# Patient Record
Sex: MALE | Race: WHITE | NOT HISPANIC OR LATINO | URBAN - METROPOLITAN AREA
[De-identification: names, ages, dates, MRNs, and addresses within clinical notes are randomized per-mention and may not be internally consistent; named-entity substitution may affect disease eponyms.]

---

## 2022-04-14 ENCOUNTER — APPOINTMENT (OUTPATIENT)
Dept: LAB | Facility: CLINIC | Age: 72
End: 2022-04-14
Payer: MEDICARE

## 2022-04-14 ENCOUNTER — APPOINTMENT (OUTPATIENT)
Dept: RADIOLOGY | Facility: CLINIC | Age: 72
End: 2022-04-14
Payer: MEDICARE

## 2022-04-14 ENCOUNTER — OFFICE VISIT (OUTPATIENT)
Dept: INTERNAL MEDICINE CLINIC | Facility: CLINIC | Age: 72
End: 2022-04-14
Payer: MEDICARE

## 2022-04-14 VITALS
DIASTOLIC BLOOD PRESSURE: 84 MMHG | WEIGHT: 143.8 LBS | SYSTOLIC BLOOD PRESSURE: 136 MMHG | HEIGHT: 67 IN | BODY MASS INDEX: 22.57 KG/M2 | TEMPERATURE: 98.2 F | OXYGEN SATURATION: 99 % | HEART RATE: 79 BPM

## 2022-04-14 DIAGNOSIS — Z13.0 SCREENING FOR DEFICIENCY ANEMIA: ICD-10-CM

## 2022-04-14 DIAGNOSIS — G25.5 MUSCLE, JERKY MOVEMENTS (UNCONTROLLED): ICD-10-CM

## 2022-04-14 DIAGNOSIS — Z13.31 NEGATIVE DEPRESSION SCREENING: ICD-10-CM

## 2022-04-14 DIAGNOSIS — Z13.220 SCREENING FOR LIPID DISORDERS: ICD-10-CM

## 2022-04-14 DIAGNOSIS — Z13.29 SCREENING FOR THYROID DISORDER: ICD-10-CM

## 2022-04-14 DIAGNOSIS — Z11.59 NEED FOR HEPATITIS C SCREENING TEST: ICD-10-CM

## 2022-04-14 DIAGNOSIS — Z12.5 SCREENING FOR PROSTATE CANCER: ICD-10-CM

## 2022-04-14 DIAGNOSIS — Z00.00 WELL ADULT EXAM: ICD-10-CM

## 2022-04-14 DIAGNOSIS — Z12.11 SCREENING FOR COLON CANCER: ICD-10-CM

## 2022-04-14 DIAGNOSIS — Z23 ENCOUNTER FOR VACCINATION: ICD-10-CM

## 2022-04-14 DIAGNOSIS — R73.09 OTHER ABNORMAL GLUCOSE: ICD-10-CM

## 2022-04-14 DIAGNOSIS — F17.290 CIGAR SMOKER: ICD-10-CM

## 2022-04-14 DIAGNOSIS — Z57.9 OCCUPATIONAL EXPOSURE IN WORKPLACE: ICD-10-CM

## 2022-04-14 DIAGNOSIS — R41.89 RECURRENT EPISODES OF UNRESPONSIVENESS: ICD-10-CM

## 2022-04-14 DIAGNOSIS — Z13.1 SCREENING FOR DIABETES MELLITUS (DM): ICD-10-CM

## 2022-04-14 DIAGNOSIS — Z12.11 SCREEN FOR COLON CANCER: ICD-10-CM

## 2022-04-14 DIAGNOSIS — M62.9 DISORDER OF MUSCLE, UNSPECIFIED: ICD-10-CM

## 2022-04-14 DIAGNOSIS — Z72.0 TOBACCO ABUSE: ICD-10-CM

## 2022-04-14 DIAGNOSIS — R41.89 RECURRENT EPISODES OF UNRESPONSIVENESS: Primary | ICD-10-CM

## 2022-04-14 LAB
ALBUMIN SERPL BCP-MCNC: 4.2 G/DL (ref 3.5–5)
ALP SERPL-CCNC: 64 U/L (ref 46–116)
ALT SERPL W P-5'-P-CCNC: 23 U/L (ref 12–78)
AMORPH URATE CRY URNS QL MICRO: ABNORMAL
ANION GAP SERPL CALCULATED.3IONS-SCNC: 2 MMOL/L (ref 4–13)
AST SERPL W P-5'-P-CCNC: 16 U/L (ref 5–45)
BACTERIA UR QL AUTO: ABNORMAL /HPF
BASOPHILS # BLD AUTO: 0.03 THOUSANDS/ΜL (ref 0–0.1)
BASOPHILS NFR BLD AUTO: 0 % (ref 0–1)
BILIRUB SERPL-MCNC: 1.56 MG/DL (ref 0.2–1)
BILIRUB UR QL STRIP: NEGATIVE
BUN SERPL-MCNC: 12 MG/DL (ref 5–25)
CALCIUM SERPL-MCNC: 9.7 MG/DL (ref 8.3–10.1)
CHLORIDE SERPL-SCNC: 105 MMOL/L (ref 100–108)
CHOLEST SERPL-MCNC: 237 MG/DL
CLARITY UR: CLEAR
CO2 SERPL-SCNC: 31 MMOL/L (ref 21–32)
COLOR UR: YELLOW
CREAT SERPL-MCNC: 0.97 MG/DL (ref 0.6–1.3)
EOSINOPHIL # BLD AUTO: 0.11 THOUSAND/ΜL (ref 0–0.61)
EOSINOPHIL NFR BLD AUTO: 1 % (ref 0–6)
ERYTHROCYTE [DISTWIDTH] IN BLOOD BY AUTOMATED COUNT: 13.6 % (ref 11.6–15.1)
EST. AVERAGE GLUCOSE BLD GHB EST-MCNC: 117 MG/DL
GFR SERPL CREATININE-BSD FRML MDRD: 77 ML/MIN/1.73SQ M
GLUCOSE P FAST SERPL-MCNC: 96 MG/DL (ref 65–99)
GLUCOSE UR STRIP-MCNC: NEGATIVE MG/DL
HBA1C MFR BLD: 5.7 %
HCT VFR BLD AUTO: 45.5 % (ref 36.5–49.3)
HCV AB SER QL: NORMAL
HDLC SERPL-MCNC: 61 MG/DL
HGB BLD-MCNC: 14.8 G/DL (ref 12–17)
HGB UR QL STRIP.AUTO: NEGATIVE
IMM GRANULOCYTES # BLD AUTO: 0.03 THOUSAND/UL (ref 0–0.2)
IMM GRANULOCYTES NFR BLD AUTO: 0 % (ref 0–2)
KETONES UR STRIP-MCNC: NEGATIVE MG/DL
LDLC SERPL CALC-MCNC: 141 MG/DL (ref 0–100)
LEUKOCYTE ESTERASE UR QL STRIP: NEGATIVE
LYMPHOCYTES # BLD AUTO: 2.43 THOUSANDS/ΜL (ref 0.6–4.47)
LYMPHOCYTES NFR BLD AUTO: 28 % (ref 14–44)
MCH RBC QN AUTO: 29.6 PG (ref 26.8–34.3)
MCHC RBC AUTO-ENTMCNC: 32.5 G/DL (ref 31.4–37.4)
MCV RBC AUTO: 91 FL (ref 82–98)
MONOCYTES # BLD AUTO: 0.69 THOUSAND/ΜL (ref 0.17–1.22)
MONOCYTES NFR BLD AUTO: 8 % (ref 4–12)
NEUTROPHILS # BLD AUTO: 5.56 THOUSANDS/ΜL (ref 1.85–7.62)
NEUTS SEG NFR BLD AUTO: 63 % (ref 43–75)
NITRITE UR QL STRIP: NEGATIVE
NON-SQ EPI CELLS URNS QL MICRO: ABNORMAL /HPF
NRBC BLD AUTO-RTO: 0 /100 WBCS
PH UR STRIP.AUTO: 7 [PH]
PLATELET # BLD AUTO: 181 THOUSANDS/UL (ref 149–390)
PMV BLD AUTO: 10.7 FL (ref 8.9–12.7)
POTASSIUM SERPL-SCNC: 4.1 MMOL/L (ref 3.5–5.3)
PROT SERPL-MCNC: 7.3 G/DL (ref 6.4–8.2)
PROT UR STRIP-MCNC: ABNORMAL MG/DL
PSA SERPL-MCNC: 1.4 NG/ML (ref 0–4)
RBC # BLD AUTO: 5 MILLION/UL (ref 3.88–5.62)
RBC #/AREA URNS AUTO: ABNORMAL /HPF
SODIUM SERPL-SCNC: 138 MMOL/L (ref 136–145)
SP GR UR STRIP.AUTO: 1.02 (ref 1–1.03)
TRIGL SERPL-MCNC: 174 MG/DL
TSH SERPL DL<=0.05 MIU/L-ACNC: 2.96 UIU/ML (ref 0.45–4.5)
UROBILINOGEN UR STRIP-ACNC: <2 MG/DL
WBC # BLD AUTO: 8.85 THOUSAND/UL (ref 4.31–10.16)
WBC #/AREA URNS AUTO: ABNORMAL /HPF

## 2022-04-14 PROCEDURE — 93000 ELECTROCARDIOGRAM COMPLETE: CPT | Performed by: INTERNAL MEDICINE

## 2022-04-14 PROCEDURE — 81001 URINALYSIS AUTO W/SCOPE: CPT | Performed by: INTERNAL MEDICINE

## 2022-04-14 PROCEDURE — 83036 HEMOGLOBIN GLYCOSYLATED A1C: CPT

## 2022-04-14 PROCEDURE — 80053 COMPREHEN METABOLIC PANEL: CPT

## 2022-04-14 PROCEDURE — 86803 HEPATITIS C AB TEST: CPT

## 2022-04-14 PROCEDURE — 84443 ASSAY THYROID STIM HORMONE: CPT

## 2022-04-14 PROCEDURE — 85025 COMPLETE CBC W/AUTO DIFF WBC: CPT

## 2022-04-14 PROCEDURE — 80061 LIPID PANEL: CPT

## 2022-04-14 PROCEDURE — 36415 COLL VENOUS BLD VENIPUNCTURE: CPT

## 2022-04-14 PROCEDURE — 99204 OFFICE O/P NEW MOD 45 MIN: CPT | Performed by: INTERNAL MEDICINE

## 2022-04-14 PROCEDURE — G0103 PSA SCREENING: HCPCS

## 2022-04-14 PROCEDURE — 71046 X-RAY EXAM CHEST 2 VIEWS: CPT

## 2022-04-14 PROCEDURE — G0009 ADMIN PNEUMOCOCCAL VACCINE: HCPCS | Performed by: INTERNAL MEDICINE

## 2022-04-14 PROCEDURE — 90677 PCV20 VACCINE IM: CPT | Performed by: INTERNAL MEDICINE

## 2022-04-14 SDOH — HEALTH STABILITY - PHYSICAL HEALTH: OCCUPATIONAL EXPOSURE TO UNSPECIFIED RISK FACTOR: Z57.9

## 2022-04-14 NOTE — PATIENT INSTRUCTIONS
Cigarette Smoking and Your Health   AMBULATORY CARE:   Risks to your health if you smoke:  Nicotine and other chemicals found in tobacco and e-cigarettes can damage every cell in your body  Even if you are a light smoker, you have an increased risk for cancer, heart disease, and lung disease  If you are pregnant or have diabetes, smoking increases your risk for complications  Nicotine can affect an adolescent's developing brain  This can lead to trouble thinking, learning, or paying attention  Benefits to your health if you stop smoking:   · You decrease respiratory symptoms such as coughing, wheezing, and shortness of breath  · You reduce your risk for cancers of the lung, mouth, throat, kidney, bladder, pancreas, stomach, and cervix  If you already have cancer, you increase the benefits of chemotherapy  You also reduce your risk for cancer returning or a second cancer from developing  · You reduce your risk for heart disease, blood clots, heart attack, and stroke  · You reduce your risk for lung infections, and diseases such as pneumonia, asthma, chronic bronchitis, and emphysema  · Your circulation improves  More oxygen can be delivered to your body  If you have diabetes, you lower your risk for complications, such as kidney, artery, and eye diseases  You also lower your risk for nerve damage  Nerve damage can lead to amputations, poor vision, and blindness  · You improve your body's ability to heal and to fight infections  · An adolescent can help his or her brain and body develop in a healthy way  Talk to your adolescent about all the health risks of nicotine  If you can, start talking about nicotine when your child is younger than 12 years  This may make it easier for him or her not to start using nicotine as a teenager or adult  Explain to him or her that it is best never to start  It can be hard to try to quit later      Benefits to the health of others if you stop smoking:  Tobacco is harmful to nonsmokers who breathe in your secondhand smoke  The following are ways the health of others around you may improve when you stop smoking:  · You lower the risks for lung cancer and heart disease in nonsmoking adults  · If you are pregnant, you lower the risk for miscarriage, early delivery, low birth weight, and stillbirth  You also lower your baby's risk for SIDS, obesity, developmental delay, and neurobehavioral problems, such as ADHD  · If you have children, you lower their risk for ear infections, colds, pneumonia, bronchitis, and asthma  Follow up with your doctor as directed:  Write down your questions so you remember to ask them during your visits  For support and more information:   · [employer]  [address] [facility]  [address]  Phone: [facility] [address]  Phone: 2- 571 - [phone]  Web Address: Isaura oliver    · Smokefree  gov  Phone: 3- 787 - 525-5196  Web Address: www smokefree  [facility] 21 2022 Information is for End User's use only and may not be sold, redistributed or otherwise used for commercial purposes  All illustrations and images included in CareNotes® are the copyrighted property of [employer]  or [address] [place] [address]  The above information is an  only  It is not intended as medical advice for individual conditions or treatments  Talk to your doctor, nurse or pharmacist before following any medical regimen to see if it is safe and effective for you

## 2022-04-14 NOTE — PROGRESS NOTES
Depression Screening and Follow-up Plan: Patient was screened for depression during today's encounter  They screened negative with a PHQ-2 score of 0  Tobacco Cessation Counseling: Tobacco cessation counseling was provided  The patient is sincerely urged to quit consumption of tobacco  He is not ready to quit tobacco  Medication options discussed  Side effects of medication not discussed  Patient refused medication     Assessment/Plan:  Problem List Items Addressed This Visit        Other    Tobacco abuse      Other Visit Diagnoses     Recurrent episodes of unresponsiveness    -  Primary    Relevant Orders    XR chest pa & lateral    CT head wo contrast    Screening for colon cancer        Relevant Orders    Ambulatory referral for colonoscopy    Well adult exam        Relevant Orders    CBC and differential    PSA, Total Screen    Comprehensive metabolic panel    Hemoglobin A1C    Lipid Panel with Direct LDL reflex    TSH, 3rd generation with Free T4 reflex    Screening for prostate cancer        Relevant Orders    PSA, Total Screen    Screening for lipid disorders        Relevant Orders    Lipid Panel with Direct LDL reflex    Screening for diabetes mellitus (DM)        Relevant Orders    Hemoglobin A1C    Screening for thyroid disorder        Relevant Orders    TSH, 3rd generation with Free T4 reflex    Screening for deficiency anemia        Relevant Orders    CBC and differential    Negative depression screening        Encounter for vaccination        Relevant Medications    Pneumococcal 20-Terri Conj Vacc (PREVNAR-20) 0 5 ML CODIE    Screen for colon cancer        Need for hepatitis C screening test        Relevant Orders    Hepatitis C antibody    Cigar smoker        Relevant Orders    XR chest pa & lateral    Muscle, jerky movements (uncontrolled)        Relevant Orders    CBC and differential    Comprehensive metabolic panel    Hemoglobin A1C    TSH, 3rd generation with Free T4 reflex    UA w Reflex to Microscopic w Reflex to Culture    POCT ECG (Completed)    XR chest pa & lateral    CT head wo contrast    Occupational exposure in workplace        Relevant Orders    CBC and differential    Comprehensive metabolic panel    Hemoglobin A1C    TSH, 3rd generation with Free T4 reflex    UA w Reflex to Microscopic w Reflex to Culture    POCT ECG (Completed)    XR chest pa & lateral    Other abnormal glucose         Relevant Orders    Hemoglobin A1C    Disorder of muscle, unspecified         Relevant Orders    TSH, 3rd generation with Free T4 reflex           Diagnoses and all orders for this visit:    Recurrent episodes of unresponsiveness  -     XR chest pa & lateral; Future  -     CT head wo contrast; Future    Screening for colon cancer  -     Ambulatory referral for colonoscopy; Future    Well adult exam  -     CBC and differential; Future  -     PSA, Total Screen; Future  -     Comprehensive metabolic panel; Future  -     Hemoglobin A1C; Future  -     Lipid Panel with Direct LDL reflex; Future  -     TSH, 3rd generation with Free T4 reflex; Future    Screening for prostate cancer  -     PSA, Total Screen; Future    Screening for lipid disorders  -     Lipid Panel with Direct LDL reflex; Future    Screening for diabetes mellitus (DM)  -     Hemoglobin A1C; Future    Screening for thyroid disorder  -     TSH, 3rd generation with Free T4 reflex; Future    Screening for deficiency anemia  -     CBC and differential; Future    Negative depression screening    Encounter for vaccination  -     Pneumococcal 20-Terri Conj Vacc (PREVNAR-20) 0 5 ML CODIE; Inject 0 5 mL into a muscle once for 1 dose    Screen for colon cancer    Need for hepatitis C screening test  -     Hepatitis C antibody; Future    Tobacco abuse    Cigar smoker  -     XR chest pa & lateral; Future    Muscle, jerky movements (uncontrolled)  -     CBC and differential; Future  -     Comprehensive metabolic panel;  Future  -     Hemoglobin A1C; Future  - TSH, 3rd generation with Free T4 reflex; Future  -     UA w Reflex to Microscopic w Reflex to Culture  -     POCT ECG  -     XR chest pa & lateral; Future  -     CT head wo contrast; Future    Occupational exposure in workplace  -     CBC and differential; Future  -     Comprehensive metabolic panel; Future  -     Hemoglobin A1C; Future  -     TSH, 3rd generation with Free T4 reflex; Future  -     UA w Reflex to Microscopic w Reflex to Culture  -     POCT ECG  -     XR chest pa & lateral; Future    Other abnormal glucose   -     Hemoglobin A1C; Future    Disorder of muscle, unspecified   -     TSH, 3rd generation with Free T4 reflex; Future        No problem-specific Assessment & Plan notes found for this encounter  A/P: Stable  Will check routine labs, including Hep c  Will update his pneumonia vaccine  Needs Td, but no wounds  Will order CRC  Stop cigars and will check an alpha one  Will check a cxr given smoking and s/s to r/o a lung lesion  EKG is normal  Doubt s/s are due to cardiac  Feel pt may be having seizures  Will check a CT head and labs  May need to see neuro and may need an EMG  May need an echo and carotid duplex  BMI is good  Continue current treatment and RTC two weeks for f/u MRI, labs, CXR, and AWV  Subjective:      Patient ID: Huang Sanders is a 67 y o  male  WM, w/o a PCP, presents to establish  PMH includes cigar smoking  PSH is denied  Occassional cigar Smoker and two beers daily/ ETOH  Doing ok and no c/o's,but wife reports pt with a three month h/o of intermittent periods where the pt stares and is unresponsive, makes "clucking" sounds and the right arm flaps  Last about a minute  No sz activity, prodrome or warning  No triggers  No tongue biting and no incontinence  No h/o TBI/CHI  No pattern  No Headaches  Remains active w/o difficulty and no falls  Denies depression  No suicidal or violent ideations  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 [employer]  No recent travel  No fever, chills, or sweats  No unexplained wt change  Denies CP, SOB, palpitations, edema, orthopnea, or PND  No sz or syncope  No changes in bowel or bladder habits  No trouble swallowing  Appetite and sleep are good  Overdue to see both a DDS and an eye doctor  Currently due for labs, vaccines, and CRC   The following portions of the patient's history were reviewed and updated as appropriate:   He has no past medical history on file  ,  does not have any pertinent problems on file  ,   has a past surgical history that includes Wrist surgery (Right) and Hernia repair  ,  family history includes Cancer in his mother; Prostate cancer in his father  ,   reports that he has been smoking cigars  He has never used smokeless tobacco  He reports current alcohol use  No history on file for drug use ,  has No Known Allergies     Current Outpatient Medications   Medication Sig Dispense Refill    Pneumococcal 20-Terri Conj Vacc (PREVNAR-20) 0 5 ML CODIE Inject 0 5 mL into a muscle once for 1 dose 0 5 mL 0     No current facility-administered medications for this visit  Review of Systems   Constitutional: Negative for activity change, chills, diaphoresis, fatigue and fever  HENT: Negative  Eyes: Negative for visual disturbance  Respiratory: Negative for cough, chest tightness, shortness of breath and wheezing  Cardiovascular: Negative for chest pain, palpitations and leg swelling  Gastrointestinal: Negative for abdominal pain, constipation, diarrhea, nausea and vomiting  Endocrine: Negative for cold intolerance and heat intolerance  Genitourinary: Negative for difficulty urinating, dysuria and frequency  Musculoskeletal: Negative for arthralgias, gait problem and myalgias  Neurological: Positive for tremors  Negative for dizziness, seizures, facial asymmetry, speech difficulty, light-headedness, numbness and headaches  Unresponsive with staring and right arm movements  Psychiatric/Behavioral: Negative for confusion and dysphoric mood  The patient is not nervous/anxious  PHQ-2/9 Depression Screening    Little interest or pleasure in doing things: 0 - not at all  Feeling down, depressed, or hopeless: 0 - not at all  PHQ-2 Score: 0  PHQ-2 Interpretation: Negative depression screen        Objective:  Vitals:    04/14/22 0901   BP: 136/84   Pulse: 79   Temp: 98 2 °F (36 8 °C)   TempSrc: Tympanic   SpO2: 99%   Weight: 65 2 kg (143 lb 12 8 oz)   Height: 5' 7" (1 702 m)     Body mass index is 22 52 kg/m²  Tobacco Cessation Counseling: Tobacco cessation counseling and education was provided  The patient is sincerely urged to quit consumption of tobacco  He is not ready to quit tobacco  The numerous health risks of tobacco consumption were discussed  If he decides to quit, there are a number of helpful adjunctive aids, and he can see me to discuss nicotine replacement therapy, chantix, or bupropion anytime in the future  Physical Exam  Vitals and nursing note reviewed  Constitutional:       General: He is not in acute distress  Appearance: Normal appearance  He is not ill-appearing  HENT:      Head: Normocephalic and atraumatic  Mouth/Throat:      Mouth: Mucous membranes are moist    Eyes:      Extraocular Movements: Extraocular movements intact  Conjunctiva/sclera: Conjunctivae normal       Pupils: Pupils are equal, round, and reactive to light  Neck:      Vascular: No carotid bruit  Cardiovascular:      Rate and Rhythm: Normal rate and regular rhythm  Heart sounds: Normal heart sounds  Pulmonary:      Effort: Pulmonary effort is normal  No respiratory distress  Breath sounds: Normal breath sounds  No wheezing or rales  Abdominal:      General: Bowel sounds are normal  There is no distension  Palpations: Abdomen is soft  Tenderness: There is no abdominal tenderness     Musculoskeletal:         General: No swelling, tenderness or deformity  Normal range of motion  Cervical back: Normal range of motion and neck supple  No rigidity or tenderness  Right lower leg: No edema  Left lower leg: No edema  Lymphadenopathy:      Cervical: No cervical adenopathy  Neurological:      General: No focal deficit present  Mental Status: He is alert and oriented to person, place, and time  Mental status is at baseline  Cranial Nerves: No cranial nerve deficit  Motor: No weakness  Coordination: Coordination normal       Gait: Gait normal       Deep Tendon Reflexes: Reflexes normal    Psychiatric:         Mood and Affect: Mood normal          Behavior: Behavior normal          Thought Content:  Thought content normal          Judgment: Judgment normal

## 2022-04-20 DIAGNOSIS — E78.3 MIXED HYPERGLYCERIDEMIA: Primary | ICD-10-CM

## 2022-04-20 RX ORDER — ATORVASTATIN CALCIUM 10 MG/1
10 TABLET, FILM COATED ORAL DAILY
Qty: 90 TABLET | Refills: 3 | Status: SHIPPED | OUTPATIENT
Start: 2022-04-20

## 2022-04-21 ENCOUNTER — HOSPITAL ENCOUNTER (OUTPATIENT)
Dept: CT IMAGING | Facility: HOSPITAL | Age: 72
Discharge: HOME | End: 2022-04-21
Attending: INTERNAL MEDICINE
Payer: MEDICARE

## 2022-04-21 DIAGNOSIS — R41.89 RECURRENT EPISODES OF UNRESPONSIVENESS: ICD-10-CM

## 2022-04-21 DIAGNOSIS — G25.5 MUSCLE, JERKY MOVEMENTS (UNCONTROLLED): ICD-10-CM

## 2022-04-21 PROCEDURE — G1004 CDSM NDSC: HCPCS

## 2022-04-21 PROCEDURE — 70450 CT HEAD/BRAIN W/O DYE: CPT

## 2022-04-26 DIAGNOSIS — E78.3 MIXED HYPERGLYCERIDEMIA: Primary | ICD-10-CM

## 2022-04-29 ENCOUNTER — OFFICE VISIT (OUTPATIENT)
Dept: INTERNAL MEDICINE CLINIC | Facility: CLINIC | Age: 72
End: 2022-04-29
Payer: MEDICARE

## 2022-04-29 VITALS
BODY MASS INDEX: 22.52 KG/M2 | WEIGHT: 143.5 LBS | DIASTOLIC BLOOD PRESSURE: 68 MMHG | TEMPERATURE: 98 F | HEART RATE: 87 BPM | OXYGEN SATURATION: 97 % | HEIGHT: 67 IN | SYSTOLIC BLOOD PRESSURE: 122 MMHG

## 2022-04-29 DIAGNOSIS — R41.89 RECURRENT EPISODES OF UNRESPONSIVENESS: Primary | ICD-10-CM

## 2022-04-29 DIAGNOSIS — G25.9 MOVEMENT DISORDER: ICD-10-CM

## 2022-04-29 DIAGNOSIS — E80.6 HYPERBILIRUBINEMIA: ICD-10-CM

## 2022-04-29 DIAGNOSIS — Z13.31 NEGATIVE DEPRESSION SCREENING: ICD-10-CM

## 2022-04-29 DIAGNOSIS — Z13.6 ENCOUNTER FOR SCREENING FOR ABDOMINAL AORTIC ANEURYSM (AAA) IN PATIENT 50 YEARS OF AGE OR OLDER WITHOUT OTHER RISK FACTORS FOR AAA: ICD-10-CM

## 2022-04-29 DIAGNOSIS — Z12.12 ENCOUNTER FOR SCREENING FOR COLORECTAL MALIGNANT NEOPLASM: ICD-10-CM

## 2022-04-29 DIAGNOSIS — G25.5 MUSCLE, JERKY MOVEMENTS (UNCONTROLLED): ICD-10-CM

## 2022-04-29 DIAGNOSIS — Z12.11 ENCOUNTER FOR SCREENING FOR COLORECTAL MALIGNANT NEOPLASM: ICD-10-CM

## 2022-04-29 DIAGNOSIS — E78.2 MIXED HYPERLIPIDEMIA: ICD-10-CM

## 2022-04-29 DIAGNOSIS — Z00.00 MEDICARE ANNUAL WELLNESS VISIT, INITIAL: ICD-10-CM

## 2022-04-29 DIAGNOSIS — F17.290 CIGAR SMOKER: ICD-10-CM

## 2022-04-29 DIAGNOSIS — Z12.11 SCREEN FOR COLON CANCER: ICD-10-CM

## 2022-04-29 PROBLEM — Z87.891 HISTORY OF PRIOR CIGAR SMOKING: Status: ACTIVE | Noted: 2022-04-14

## 2022-04-29 PROBLEM — Z87.891 HISTORY OF PRIOR CIGAR SMOKING: Status: RESOLVED | Noted: 2022-04-14 | Resolved: 2022-04-29

## 2022-04-29 PROBLEM — Z72.0 TOBACCO ABUSE: Status: RESOLVED | Noted: 2022-04-14 | Resolved: 2022-04-29

## 2022-04-29 PROCEDURE — 1123F ACP DISCUSS/DSCN MKR DOCD: CPT | Performed by: INTERNAL MEDICINE

## 2022-04-29 PROCEDURE — G0438 PPPS, INITIAL VISIT: HCPCS | Performed by: INTERNAL MEDICINE

## 2022-04-29 PROCEDURE — 99214 OFFICE O/P EST MOD 30 MIN: CPT | Performed by: INTERNAL MEDICINE

## 2022-04-29 NOTE — PROGRESS NOTES
Assessment and Plan:     Problem List Items Addressed This Visit        Other    Mixed hyperlipidemia    Hyperbilirubinemia      Other Visit Diagnoses     Recurrent episodes of unresponsiveness    -  Primary    Muscle, jerky movements (uncontrolled)        Movement disorder        Medicare annual wellness visit, initial        Encounter for screening for colorectal malignant neoplasm        Negative depression screening        Screen for colon cancer               Preventive health issues were discussed with patient, and age appropriate screening tests were ordered as noted in patient's After Visit Summary  Personalized health advice and appropriate referrals for health education or preventive services given if needed, as noted in patient's After Visit Summary  History of Present Illness:     Patient presents for Medicare Annual Wellness visit    Patient Care Team:  Martha Carrillo DO as PCP - General (Internal Medicine)     Problem List:     Patient Active Problem List   Diagnosis    Mixed hyperlipidemia    Hyperbilirubinemia      Past Medical and Surgical History:     History reviewed  No pertinent past medical history  Past Surgical History:   Procedure Laterality Date    HERNIA REPAIR      when he was a kid    WRIST SURGERY Right     when he was a kid      Family History:     Family History   Problem Relation Age of Onset    Cancer Mother     Diabetes Mother     Prostate cancer Father     Cancer Brother       Social History:     Social History     Socioeconomic History    Marital status: /Civil Union     Spouse name: None    Number of children: None    Years of education: None    Highest education level: None   Occupational History    Occupation: retired   Tobacco Use    Smoking status: Light Tobacco Smoker     Types: Cigars    Smokeless tobacco: Never Used    Tobacco comment: cigar use   Vaping Use    Vaping Use: Never used   Substance and Sexual Activity    Alcohol use:  Yes Comment: two beers/ day and occassional wine    Drug use: None    Sexual activity: Yes     Partners: Female   Other Topics Concern    None   Social History Narrative        One child    Retired nicole    Lives with his wife  Social Determinants of Health     Financial Resource Strain: Not on file   Food Insecurity: Not on file   Transportation Needs: Not on file   Physical Activity: Not on file   Stress: Not on file   Social Connections: Not on file   Intimate Partner Violence: Not on file   Housing Stability: Not on file      Medications and Allergies:     Current Outpatient Medications   Medication Sig Dispense Refill    atorvastatin (LIPITOR) 10 mg tablet Take 1 tablet (10 mg total) by mouth daily 90 tablet 3     No current facility-administered medications for this visit  No Known Allergies   Immunizations:     Immunization History   Administered Date(s) Administered    Pneumococcal Conjugate Vaccine 20-valent (Pcv20), Polysace 04/14/2022      Health Maintenance:         Topic Date Due    Colorectal Cancer Screening  Never done    Hepatitis C Screening  Completed         Topic Date Due    COVID-19 Vaccine (1) Never done    Pneumococcal Vaccine: 65+ Years (1 of 2 - PPSV23) 04/09/1956    DTaP,Tdap,and Td Vaccines (1 - Tdap) Never done    Influenza Vaccine (1) Never done      Medicare Health Risk Assessment:     /68   Pulse 87   Temp 98 °F (36 7 °C)   Ht 5' 7" (1 702 m)   Wt 65 1 kg (143 lb 8 oz)   SpO2 97%   BMI 22 48 kg/m²      Saida Solis is here for his Initial Wellness visit  Health Risk Assessment:   Patient rates overall health as good  Patient feels that their physical health rating is same  Patient is satisfied with their life  Eyesight was rated as same  Hearing was rated as same  Patient feels that their emotional and mental health rating is same  Patients states they are sometimes angry  Patient states they are never, rarely unusually tired/fatigued   Pain experienced in the last 7 days has been none  Patient states that he has experienced no weight loss or gain in last 6 months  Depression Screening:   PHQ-2 Score: 0      Fall Risk Screening: In the past year, patient has experienced: no history of falling in past year      Home Safety:  Patient does not have trouble with stairs inside or outside of their home  Patient has working smoke alarms and has working carbon monoxide detector  Home safety hazards include: none  Nutrition:   Current diet is Regular  Medications:   Patient is not currently taking any over-the-counter supplements  Patient is able to manage medications  Activities of Daily Living (ADLs)/Instrumental Activities of Daily Living (IADLs):   Walk and transfer into and out of bed and chair?: Yes  Dress and groom yourself?: Yes    Bathe or shower yourself?: Yes    Feed yourself? Yes  Do your laundry/housekeeping?: Yes  Manage your money, pay your bills and track your expenses?: Yes  Make your own meals?: Yes    Do your own shopping?: Yes    Previous Hospitalizations:   Any hospitalizations or ED visits within the last 12 months?: No      Advance Care Planning:   Living will: Yes    Durable POA for healthcare:  Yes    Advanced directive: Yes    Advanced directive counseling given: Yes    Five wishes given: No    Patient declined ACP directive: No    End of Life Decisions reviewed with patient: Yes    Provider agrees with end of life decisions: Yes      Cognitive Screening:   Provider or family/friend/caregiver concerned regarding cognition?: No    PREVENTIVE SCREENINGS      Cardiovascular Screening:    General: Screening Not Indicated, History Lipid Disorder and Screening Current      Diabetes Screening:     General: Screening Current      Colorectal Cancer Screening:     General: Risks and Benefits Discussed    Due for: Cologuard      Prostate Cancer Screening:    General: Screening Current      Osteoporosis Screening:    General: Screening Not Indicated      Abdominal Aortic Aneurysm (AAA) Screening:    Risk factors include: age between 73-69 yo and tobacco use        General: Risks and Benefits Discussed and Patient Declines      Lung Cancer Screening:     General: Screening Not Indicated      Hepatitis C Screening:    General: Screening Current    Screening, Brief Intervention, and Referral to Treatment (SBIRT)    Screening  Typical number of drinks in a day: 2  Typical number of drinks in a week: 14  Interpretation: Low risk drinking behavior  Single Item Drug Screening:  How often have you used an illegal drug (including marijuana) or a prescription medication for non-medical reasons in the past year? never    Single Item Drug Screen Score: 0  Interpretation: Negative screen for possible drug use disorder    Other Counseling Topics:   Car/seat belt/driving safety, sunscreen and calcium and vitamin D intake and regular weightbearing exercise  A/P: Doing well and no falls reported  Denies depression and feels safe at home  Diverse diet  No problems operating a MV and uses seat belts  Has a living will and POA  No DME or referrals needed today  RTC one year for medicare wellness       Senia Finch DO

## 2022-04-29 NOTE — PATIENT INSTRUCTIONS
Hyperlipidemia   WHAT YOU NEED TO KNOW:   What is hyperlipidemia? Hyperlipidemia is a high level of lipids (fats) in your blood  These lipids include cholesterol or triglycerides  Lipids are made by your body  They also come from the foods you eat  Your body needs lipids to work properly, but high levels increase your risk for heart disease, heart attack, and stroke  What increases my risk for hyperlipidemia? · Family history of high lipid levels    · Diet high in saturated fats, cholesterol, or calories    · High alcohol intake or smoking    · Lack of regular physical activity    · Medical conditions such as hypothyroidism, obesity, or type 2 diabetes    · Certain medicines, such as blood pressure medicines, hormones, and steroids    How is hyperlipidemia managed and treated? Your healthcare provider may first recommend that you make lifestyle changes to help decrease your lipid levels  Your provider may recommend you work with a team to manage hyperlipidemia  The team may include medical experts such as a dietitian, an exercise or physical therapist, and a behavior therapist  Your family members may be included in helping you create lifestyle changes  You may also need to take medicine to lower your lipid levels  Some of the lifestyle changes you may need to make include the following:  · Maintain a healthy weight  Ask your healthcare provider what a healthy weight is for you  Ask him or her to help you create a weight loss plan if you are overweight  Weight loss can decrease your cholesterol and triglyceride levels  · Be physically active throughout the day  Physical activity, such as exercise, lowers your cholesterol levels and helps you maintain a healthy weight  Get 30 minutes or more of aerobic exercise 4 to 6 days each week  You can split your exercise into four 10-minute workouts instead of 30 minutes at one time  Examples of aerobic exercises include walking briskly, swimming, or riding a bike  Work with your healthcare provider to plan the best exercise program for you  Also include strength training at least 2 times each week  Your healthcare providers can help you create a physical activity plan  · Do not smoke  Nicotine and other chemicals in cigarettes and cigars can increase your risk for a heart attack and stroke  Ask your healthcare provider for information if you currently smoke and need help to quit  E-cigarettes or smokeless tobacco still contain nicotine  Talk to your healthcare provider before you use these products  · Eat heart-healthy foods  A dietitian or your provider can give you more information on low-sodium plans or the DASH (Dietary Approaches to Stop Hypertension) eating plan  The DASH plan is low in sodium, processed sugar, unhealthy fats, and total fat  It is high in potassium, calcium, and fiber  It is high in potassium, calcium, and fiber  These can be found in vegetables, fruit, and whole-grain foods  The following are ways to get more heart-healthy foods:         ? Decrease the total amount of fat you eat  Choose lean meats, fat-free or 1% fat milk, and low-fat dairy products, such as yogurt and cheese  Limit or do not eat red meat  Red meats are high in fat and cholesterol  ? Replace unhealthy fats with healthy fats  Unhealthy fats include saturated fat, trans fat, and cholesterol  Choose soft margarines that are low in saturated fat and have little or no trans fat  Monounsaturated fats are healthy fats  These are found in olive oil, canola oil, avocado, and nuts  Polyunsaturated fats are also healthy  These are found in fish, flaxseed, walnuts, and soybeans  ? Eat 5 or more servings of fruits and vegetables every day  They are low in calories and fat, and a good source of essential vitamins  Include dark green, red, and orange vegetables  Examples include spinach, kale, broccoli, and carrots  ? Eat foods high in fiber    Fiber can help lower your cholesterol levels  Choose whole grain, high-fiber foods  Good choices include whole-wheat breads or cereals, beans, peas, fruits, and vegetables  ? Limit sodium (salt) as directed  Too much sodium can affect your fluid balance and blood pressure  Your healthcare provider will tell you how much sodium and potassium are safe for you to have in a day  He or she may recommend that you limit sodium to 2,300 mg a day  Your provider or a dietitian can help you find ways to limit sodium  For example, if you add salt while you cook, do not add more salt at the table  Check labels to find low-sodium or no-salt-added foods  Some low-sodium foods use potassium salts for flavor  Too much potassium can also cause health problems  · Ask your healthcare provider if it is okay for you to drink alcohol  Alcohol can increase your cholesterol and triglyceride levels  Your provider can tell you how many drinks are okay to have within 24 hours and within 1 week  A drink of alcohol is 12 ounces of beer, 5 ounces of wine, or 1½ ounces of liquor  Call your local emergency number [phone] in the [address] [facility]) or have someone call if:   · You have any of the following signs of a heart attack:      ? Squeezing, pressure, or pain in your chest    ? You may  also have any of the following:     § Discomfort or pain in your back, neck, jaw, stomach, or arm    § Shortness of breath    § Nausea or vomiting    § Lightheadedness or a sudden cold sweat    · You have any of the following signs of a stroke:      ? Numbness or drooping on one side of your face     ? Weakness in an arm or leg    ? Confusion or difficulty speaking    ? Dizziness, a severe headache, or vision loss    When should I call my doctor? · You have questions or concerns about your condition or care  CARE AGREEMENT:   You have the right to help plan your care  Learn about your health condition and how it may be treated   Discuss treatment options with your healthcare providers to decide what care you want to receive  You always have the right to refuse treatment  The above information is an  only  It is not intended as medical advice for individual conditions or treatments  Talk to your doctor, nurse or pharmacist before following any medical regimen to see if it is safe and effective for you  © Copyright [employer] 2022 Information is for End User's use only and may not be sold, redistributed or otherwise used for commercial purposes  All illustrations and images included in CareNotes® are the copyrighted property of [employer]  or [facility] Preventive Visit Patient Instructions  Thank you for completing your Welcome to Medicare Visit or Medicare Annual Wellness Visit today  Your next wellness visit will be due in one year (4/30/2023)  The screening/preventive services that you may require over the next 5-10 years are detailed below  Some tests may not apply to you based off risk factors and/or age  Screening tests ordered at today's visit but not completed yet may show as past due  Also, please note that scanned in results may not display below  Preventive Screenings:  Service Recommendations Previous Testing/Comments   Colorectal Cancer Screening  · Colonoscopy    · Fecal Occult Blood Test (FOBT)/Fecal Immunochemical Test (FIT)  · Fecal DNA/Cologuard Test  · Flexible Sigmoidoscopy Age: 54-65 years old   Colonoscopy: every 10 years (May be performed more frequently if at higher risk)  OR  FOBT/FIT: every 1 year  OR  Cologuard: every 3 years  OR  Sigmoidoscopy: every 5 years  Screening may be recommended earlier than age 48 if at higher risk for colorectal cancer  Also, an individualized decision between you and your healthcare provider will decide whether screening between the ages of 74-80 would be appropriate   Colonoscopy: Not on file  FOBT/FIT: Not on file  Cologuard: Not on file  Sigmoidoscopy: Not on file          Prostate Cancer Screening Individualized decision between patient and health care provider in men between ages of 53-78   Medicare will cover every 12 months beginning on the day after your 50th birthday PSA: 1 4 ng/mL     Screening Current     Hepatitis C Screening Once for adults born between 1945 and 1965  More frequently in patients at high risk for Hepatitis C Hep C Antibody: 04/14/2022    Screening Current   Diabetes Screening 1-2 times per year if you're at risk for diabetes or have pre-diabetes Fasting glucose: 96 mg/dL   A1C: 5 7 %    Screening Current   Cholesterol Screening Once every 5 years if you don't have a lipid disorder  May order more often based on risk factors  Lipid panel: 04/14/2022    Screening Not Indicated  History Lipid Disorder      Other Preventive Screenings Covered by Medicare:  1  Abdominal Aortic Aneurysm (AAA) Screening: covered once if your at risk  You're considered to be at risk if you have a family history of AAA or a male between the age of 73-68 who smoking at least 100 cigarettes in your lifetime  2  Lung Cancer Screening: covers low dose CT scan once per year if you meet all of the following conditions: (1) Age 50-69; (2) No signs or symptoms of lung cancer; (3) Current smoker or have quit smoking within the last 15 years; (4) You have a tobacco smoking history of at least 30 pack years (packs per day x number of years you smoked); (5) You get a written order from a healthcare provider  3  Glaucoma Screening: covered annually if you're considered high risk: (1) You have diabetes OR (2) Family history of glaucoma OR (3)  aged 48 and older OR (3)  American aged 72 and older  3   Osteoporosis Screening: covered every 2 years if you meet one of the following conditions: (1) Have a vertebral abnormality; (2) On glucocorticoid therapy for more than 3 months; (3) Have primary hyperparathyroidism; (4) On osteoporosis medications and need to assess response to drug therapy  5  HIV Screening: covered annually if you're between the age of 12-76  Also covered annually if you are younger than 13 and older than 72 with risk factors for HIV infection  For pregnant patients, it is covered up to 3 times per pregnancy  Immunizations:  Immunization Recommendations   Influenza Vaccine Annual influenza vaccination during flu season is recommended for all persons aged >= 6 months who do not have contraindications   Pneumococcal Vaccine (Prevnar and Pneumovax)  * Prevnar = PCV13  * Pneumovax = PPSV23 Adults 25-60 years old: 1-3 doses may be recommended based on certain risk factors  Adults 72 years old: Prevnar (PCV13) vaccine recommended followed by Pneumovax (PPSV23) vaccine  If already received PPSV23 since turning 65, then PCV13 recommended at least one year after PPSV23 dose  Hepatitis B Vaccine 3 dose series if at intermediate or high risk (ex: diabetes, end stage renal disease, liver disease)   Tetanus (Td) Vaccine - COST NOT COVERED BY MEDICARE PART B Following completion of primary series, a booster dose should be given every 10 years to maintain immunity against tetanus  Td may also be given as tetanus wound prophylaxis  Tdap Vaccine - COST NOT COVERED BY MEDICARE PART B Recommended at least once for all adults  For pregnant patients, recommended with each pregnancy  Shingles Vaccine (Shingrix) - COST NOT COVERED BY MEDICARE PART B  2 shot series recommended in those aged 48 and above     Health Maintenance Due:      Topic Date Due    Colorectal Cancer Screening  Never done    Hepatitis C Screening  Completed     Immunizations Due:      Topic Date Due    COVID-19 Vaccine (1) Never done    Pneumococcal Vaccine: 65+ Years (1 of 2 - PPSV23) 04/09/1956    DTaP,Tdap,and Td Vaccines (1 - Tdap) Never done    Influenza Vaccine (1) Never done     Advance Directives   What are advance directives?   Advance directives are legal documents that state your wishes and plans for medical care  These plans are made ahead of time in case you lose your ability to make decisions for yourself  Advance directives can apply to any medical decision, such as the treatments you want, and if you want to donate organs  What are the types of advance directives? There are many types of advance directives, and each state has rules about how to use them  You may choose a combination of any of the following:  · Living will: This is a written record of the treatment you want  You can also choose which treatments you do not want, which to limit, and which to stop at a certain time  This includes surgery, medicine, IV fluid, and tube feedings  · Durable power of  for healthcare [place] [facility]): This is a written record that states who you want to make healthcare choices for you when you are unable to make them for yourself  This person, called a proxy, is usually a family member or a friend  You may choose more than 1 proxy  · Do not resuscitate (DNR) order:  A DNR order is used in case your heart stops beating or you stop breathing  It is a request not to have certain forms of treatment, such as CPR  A DNR order may be included in other types of advance directives  · Medical directive: This covers the care that you want if you are in a coma, near death, or unable to make decisions for yourself  You can list the treatments you want for each condition  Treatment may include pain medicine, surgery, blood transfusions, dialysis, IV or tube feedings, and a ventilator (breathing machine)  · Values history: This document has questions about your views, beliefs, and how you feel and think about life  This information can help others choose the care that you would choose  Why are advance directives important? An advance directive helps you control your care  Although spoken wishes may be used, it is better to have your wishes written down  Spoken wishes can be misunderstood, or not followed   Treatments may be given even if you do not want them  An advance directive may make it easier for your family to make difficult choices about your care  Cigarette Smoking and Your Health   Risks to your health if you smoke:  Nicotine and other chemicals found in tobacco damage every cell in your body  Even if you are a light smoker, you have an increased risk for cancer, heart disease, and lung disease  If you are pregnant or have diabetes, smoking increases your risk for complications  Benefits to your health if you stop smoking:   · You decrease respiratory symptoms such as coughing, wheezing, and shortness of breath  · You reduce your risk for cancers of the lung, mouth, throat, kidney, bladder, pancreas, stomach, and cervix  If you already have cancer, you increase the benefits of chemotherapy  You also reduce your risk for cancer returning or a second cancer from developing  · You reduce your risk for heart disease, blood clots, heart attack, and stroke  · You reduce your risk for lung infections, and diseases such as pneumonia, asthma, chronic bronchitis, and emphysema  · Your circulation improves  More oxygen can be delivered to your body  If you have diabetes, you lower your risk for complications, such as kidney, artery, and eye diseases  You also lower your risk for nerve damage  Nerve damage can lead to amputations, poor vision, and blindness  · You improve your body's ability to heal and to fight infections  For more information and support to stop smoking:   · Smokefree  gov  Phone: 8- 518 - 824-1792  Web Address: www [employer]   [employer] 2018 Information is for End User's use only and may not be sold, redistributed or otherwise used for commercial purposes   All illustrations and images included in CareNotes® are the copyrighted property of [employer]  or [facility] Preventive Visit Patient Instructions  Thank you for completing your Welcome to [place] Visit or Medicare Annual Wellness Visit today  Your next wellness visit will be due in one year (4/30/2023)  The screening/preventive services that you may require over the next 5-10 years are detailed below  Some tests may not apply to you based off risk factors and/or age  Screening tests ordered at today's visit but not completed yet may show as past due  Also, please note that scanned in results may not display below  Preventive Screenings:  Service Recommendations Previous Testing/Comments   Colorectal Cancer Screening  · Colonoscopy    · Fecal Occult Blood Test (FOBT)/Fecal Immunochemical Test (FIT)  · Fecal DNA/Cologuard Test  · Flexible Sigmoidoscopy Age: 54-65 years old   Colonoscopy: every 10 years (May be performed more frequently if at higher risk)  OR  FOBT/FIT: every 1 year  OR  Cologuard: every 3 years  OR  Sigmoidoscopy: every 5 years  Screening may be recommended earlier than age 48 if at higher risk for colorectal cancer  Also, an individualized decision between you and your healthcare provider will decide whether screening between the ages of 74-80 would be appropriate  Colonoscopy: Not on file  FOBT/FIT: Not on file  Cologuard: Not on file  Sigmoidoscopy: Not on file          Prostate Cancer Screening Individualized decision between patient and health care provider in men between ages of 53-78   Medicare will cover every 12 months beginning on the day after your 50th birthday PSA: 1 4 ng/mL     Screening Current     Hepatitis C Screening Once for adults born between 1945 and 1965  More frequently in patients at high risk for Hepatitis C Hep C Antibody: 04/14/2022    Screening Current   Diabetes Screening 1-2 times per year if you're at risk for diabetes or have pre-diabetes Fasting glucose: 96 mg/dL   A1C: 5 7 %    Screening Current   Cholesterol Screening Once every 5 years if you don't have a lipid disorder  May order more often based on risk factors   Lipid panel: 04/14/2022    Screening Not Indicated  History Lipid Disorder      Other Preventive Screenings Covered by Medicare:  6  Abdominal Aortic Aneurysm (AAA) Screening: covered once if your at risk  You're considered to be at risk if you have a family history of AAA or a male between the age of 73-68 who smoking at least 100 cigarettes in your lifetime  7  Lung Cancer Screening: covers low dose CT scan once per year if you meet all of the following conditions: (1) Age 50-69; (2) No signs or symptoms of lung cancer; (3) Current smoker or have quit smoking within the last 15 years; (4) You have a tobacco smoking history of at least 30 pack years (packs per day x number of years you smoked); (5) You get a written order from a healthcare provider  8  Glaucoma Screening: covered annually if you're considered high risk: (1) You have diabetes OR (2) Family history of glaucoma OR (3)  aged 48 and older OR (3)  American aged 72 and older  5  Osteoporosis Screening: covered every 2 years if you meet one of the following conditions: (1) Have a vertebral abnormality; (2) On glucocorticoid therapy for more than 3 months; (3) Have primary hyperparathyroidism; (4) On osteoporosis medications and need to assess response to drug therapy  10  HIV Screening: covered annually if you're between the age of 12-76  Also covered annually if you are younger than 13 and older than 72 with risk factors for HIV infection  For pregnant patients, it is covered up to 3 times per pregnancy  Immunizations:  Immunization Recommendations   Influenza Vaccine Annual influenza vaccination during flu season is recommended for all persons aged >= 6 months who do not have contraindications   Pneumococcal Vaccine (Prevnar and Pneumovax)  * Prevnar = PCV13  * Pneumovax = PPSV23 Adults 25-60 years old: 1-3 doses may be recommended based on certain risk factors  Adults 72 years old: Prevnar (PCV13) vaccine recommended followed by Pneumovax (PPSV23) vaccine   If already received PPSV23 since turning 65, then PCV13 recommended at least one year after PPSV23 dose  Hepatitis B Vaccine 3 dose series if at intermediate or high risk (ex: diabetes, end stage renal disease, liver disease)   Tetanus (Td) Vaccine - COST NOT COVERED BY MEDICARE PART B Following completion of primary series, a booster dose should be given every 10 years to maintain immunity against tetanus  Td may also be given as tetanus wound prophylaxis  Tdap Vaccine - COST NOT COVERED BY MEDICARE PART B Recommended at least once for all adults  For pregnant patients, recommended with each pregnancy  Shingles Vaccine (Shingrix) - COST NOT COVERED BY MEDICARE PART B  2 shot series recommended in those aged 48 and above     Health Maintenance Due:      Topic Date Due    Colorectal Cancer Screening  Never done    Hepatitis C Screening  Completed     Immunizations Due:      Topic Date Due    COVID-19 Vaccine (1) Never done    Pneumococcal Vaccine: 65+ Years (1 of 2 - PPSV23) 04/09/1956    DTaP,Tdap,and Td Vaccines (1 - Tdap) Never done    Influenza Vaccine (1) Never done     Advance Directives   What are advance directives? Advance directives are legal documents that state your wishes and plans for medical care  These plans are made ahead of time in case you lose your ability to make decisions for yourself  Advance directives can apply to any medical decision, such as the treatments you want, and if you want to donate organs  What are the types of advance directives? There are many types of advance directives, and each state has rules about how to use them  You may choose a combination of any of the following:  · Living will: This is a written record of the treatment you want  You can also choose which treatments you do not want, which to limit, and which to stop at a certain time  This includes surgery, medicine, IV fluid, and tube feedings  · Durable power of  for healthcare [place] [facility]):   This is a written record that states who you want to make healthcare choices for you when you are unable to make them for yourself  This person, called a proxy, is usually a family member or a friend  You may choose more than 1 proxy  · Do not resuscitate (DNR) order:  A DNR order is used in case your heart stops beating or you stop breathing  It is a request not to have certain forms of treatment, such as CPR  A DNR order may be included in other types of advance directives  · Medical directive: This covers the care that you want if you are in a coma, near death, or unable to make decisions for yourself  You can list the treatments you want for each condition  Treatment may include pain medicine, surgery, blood transfusions, dialysis, IV or tube feedings, and a ventilator (breathing machine)  · Values history: This document has questions about your views, beliefs, and how you feel and think about life  This information can help others choose the care that you would choose  Why are advance directives important? An advance directive helps you control your care  Although spoken wishes may be used, it is better to have your wishes written down  Spoken wishes can be misunderstood, or not followed  Treatments may be given even if you do not want them  An advance directive may make it easier for your family to make difficult choices about your care  Cigarette Smoking and Your Health   Risks to your health if you smoke:  Nicotine and other chemicals found in tobacco damage every cell in your body  Even if you are a light smoker, you have an increased risk for cancer, heart disease, and lung disease  If you are pregnant or have diabetes, smoking increases your risk for complications  Benefits to your health if you stop smoking:   · You decrease respiratory symptoms such as coughing, wheezing, and shortness of breath     · You reduce your risk for cancers of the lung, mouth, throat, kidney, bladder, pancreas, stomach, and cervix  If you already have cancer, you increase the benefits of chemotherapy  You also reduce your risk for cancer returning or a second cancer from developing  · You reduce your risk for heart disease, blood clots, heart attack, and stroke  · You reduce your risk for lung infections, and diseases such as pneumonia, asthma, chronic bronchitis, and emphysema  · Your circulation improves  More oxygen can be delivered to your body  If you have diabetes, you lower your risk for complications, such as kidney, artery, and eye diseases  You also lower your risk for nerve damage  Nerve damage can lead to amputations, poor vision, and blindness  · You improve your body's ability to heal and to fight infections  For more information and support to stop smoking:   · [employer]  gov  Phone: 9- 605 - 903-8795  Web Address: www [employer]  [facility] [employer] 2018 Information is for End User's use only and may not be sold, redistributed or otherwise used for commercial purposes   All illustrations and images included in CareNotes® are the copyrighted property of A D A M , Inc  or [address] [place] [address]

## 2022-04-29 NOTE — PROGRESS NOTES
Assessment/Plan:  Problem List Items Addressed This Visit        Other    Mixed hyperlipidemia    Hyperbilirubinemia    Cigar smoker      Other Visit Diagnoses     Recurrent episodes of unresponsiveness    -  Primary    Relevant Orders    Ambulatory Referral to Neurology    Ambulatory EEG 24 Hours    Muscle, jerky movements (uncontrolled)        Relevant Orders    Ambulatory Referral to Neurology    Ambulatory EEG 24 Hours    Movement disorder        Relevant Orders    Ambulatory Referral to Neurology    Ambulatory EEG 24 Hours    Medicare annual wellness visit, initial        Encounter for screening for colorectal malignant neoplasm        Relevant Orders    Ambulatory Referral to Neurology    Ambulatory EEG 24 Hours    Cologuard    Negative depression screening        Screen for colon cancer        Encounter for screening for abdominal aortic aneurysm (AAA) in patient 48years of age or older without other risk factors for AAA        Relevant Orders    US abdominal aorta screening aaa           Diagnoses and all orders for this visit:    Recurrent episodes of unresponsiveness  -     Ambulatory Referral to Neurology; Future  -     Ambulatory EEG 24 Hours; Standing    Muscle, jerky movements (uncontrolled)  -     Ambulatory Referral to Neurology; Future  -     Ambulatory EEG 24 Hours; Standing    Movement disorder  -     Ambulatory Referral to Neurology; Future  -     Ambulatory EEG 24 Hours; Standing    Hyperbilirubinemia    Mixed hyperlipidemia    Medicare annual wellness visit, initial    Encounter for screening for colorectal malignant neoplasm  -     Ambulatory Referral to Neurology; Future  -     Ambulatory EEG 24 Hours; Standing  -     Cologuard    Negative depression screening    Screen for colon cancer    Cigar smoker    Encounter for screening for abdominal aortic aneurysm (AAA) in patient 48years of age or older without other risk factors for AAA  -      abdominal aorta screening aaa;  Future        No problem-specific Assessment & Plan notes found for this encounter  A/P: Stable  Discussed labs and imaging  ?? ?cause  Problem is that the pt doesn't believe he is having any episodes and since he doesn't "pass out" nothing is wrong  Pt and wife were actually having a heated argument in the room about it  Pt is willing to continue with a w/u  Stacey Sequin Still feel like he is having sz  Will check an EEG, refer to neuro, and consider an MRI  Pt started  meds for the cholesterol   RTC one month for f/u  Discussed AAA Screening pt is willing to get AAA screening  Asked to wife to get a video with her cell phone fo the events for us to review       Subjective:      Patient ID: Xavier Soto is a 67 y o  male  WM RTC for f/u several months of intermittent episodes of pt becoming unresponsive, staring off into space, "clucking" , and flapping his extremities  No sz activity, incontinence, or biting his tongue  ? post event confusion  No triggers  Labs ok except for elevated TG/LDL and bilirubin  CXR and CT head were negative  Reports from the wife that he is still having the episode, BUT the pt denies having any issues and becomes very agitated about the subject  No injuries  No new issues  The following portions of the patient's history were reviewed and updated as appropriate:   He has no past medical history on file  ,  does not have any pertinent problems on file  ,   has a past surgical history that includes Wrist surgery (Right) and Hernia repair  ,  family history includes Cancer in his brother and mother; Diabetes in his mother; Prostate cancer in his father  ,   reports that he has been smoking cigars  He has never used smokeless tobacco  He reports current alcohol use  No history on file for drug use ,  has No Known Allergies     Current Outpatient Medications   Medication Sig Dispense Refill    atorvastatin (LIPITOR) 10 mg tablet Take 1 tablet (10 mg total) by mouth daily 90 tablet 3     No current facility-administered medications for this visit  Review of Systems   Constitutional: Negative for activity change, chills, diaphoresis, fatigue and fever  HENT: Negative  Eyes: Negative for visual disturbance  Respiratory: Negative for cough, chest tightness, shortness of breath and wheezing  Cardiovascular: Negative for chest pain, palpitations and leg swelling  Gastrointestinal: Negative for abdominal pain, constipation, diarrhea, nausea and vomiting  Endocrine: Negative for cold intolerance and heat intolerance  Genitourinary: Negative for difficulty urinating, dysuria and frequency  Musculoskeletal: Negative for arthralgias, gait problem and myalgias  Neurological: Positive for seizures  Negative for dizziness, tremors, syncope, facial asymmetry, speech difficulty, weakness, light-headedness, numbness and headaches  Flailing of the extremities and staring  Psychiatric/Behavioral: Negative for confusion, dysphoric mood and sleep disturbance  The patient is not nervous/anxious  PHQ-2/9 Depression Screening    Little interest or pleasure in doing things: 0 - not at all  Feeling down, depressed, or hopeless: 0 - not at all  PHQ-2 Score: 0  PHQ-2 Interpretation: Negative depression screen        Objective:  Vitals:    04/29/22 1441   BP: 122/68   Pulse: 87   Temp: 98 °F (36 7 °C)   SpO2: 97%   Weight: 65 1 kg (143 lb 8 oz)   Height: 5' 7" (1 702 m)     Body mass index is 22 48 kg/m²  Physical Exam  Vitals and nursing note reviewed  Constitutional:       General: He is not in acute distress  Appearance: Normal appearance  He is not ill-appearing  HENT:      Head: Normocephalic and atraumatic  Mouth/Throat:      Mouth: Mucous membranes are moist    Eyes:      Extraocular Movements: Extraocular movements intact  Conjunctiva/sclera: Conjunctivae normal       Pupils: Pupils are equal, round, and reactive to light  Neck:      Vascular: No carotid bruit  Cardiovascular:      Rate and Rhythm: Normal rate and regular rhythm  Heart sounds: Normal heart sounds  Pulmonary:      Effort: Pulmonary effort is normal  No respiratory distress  Breath sounds: Normal breath sounds  No wheezing or rales  Abdominal:      General: Bowel sounds are normal  There is no distension  Palpations: Abdomen is soft  Tenderness: There is no abdominal tenderness  Musculoskeletal:      Cervical back: Neck supple  Right lower leg: No edema  Left lower leg: No edema  Neurological:      General: No focal deficit present  Mental Status: He is alert and oriented to person, place, and time  Mental status is at baseline  Cranial Nerves: No cranial nerve deficit  Motor: No weakness  Coordination: Coordination normal       Gait: Gait normal       Deep Tendon Reflexes: Reflexes normal    Psychiatric:         Mood and Affect: Mood normal          Behavior: Behavior normal          Thought Content:  Thought content normal          Judgment: Judgment normal

## 2022-05-06 ENCOUNTER — TELEPHONE (OUTPATIENT)
Dept: INTERNAL MEDICINE CLINIC | Facility: CLINIC | Age: 72
End: 2022-05-06

## 2022-05-06 DIAGNOSIS — G25.5 MUSCLE, JERKY MOVEMENTS (UNCONTROLLED): ICD-10-CM

## 2022-05-06 DIAGNOSIS — G25.9 MOVEMENT DISORDER: ICD-10-CM

## 2022-05-06 DIAGNOSIS — R41.89 RECURRENT EPISODES OF UNRESPONSIVENESS: Primary | ICD-10-CM

## 2022-05-06 NOTE — TELEPHONE ENCOUNTER
Pt's spouse called stating she tried to schedule EEG 24 hours but was told they were unable to schedule this pt needs regular one scheduled first  Can this be changed?

## 2022-05-22 LAB — COLOGUARD RESULT REPORTABLE: NEGATIVE

## 2022-05-24 ENCOUNTER — RA CDI HCC (OUTPATIENT)
Dept: OTHER | Facility: HOSPITAL | Age: 72
End: 2022-05-24

## 2022-05-24 NOTE — PROGRESS NOTES
[address]  coding opportunities       Chart reviewed, no opportunity found: CHART REVIEWED, NO OPPORTUNITY FOUND        Patients Insurance     Medicare Insurance: Medicare

## 2022-05-27 ENCOUNTER — HOSPITAL ENCOUNTER (OUTPATIENT)
Dept: ULTRASOUND IMAGING | Facility: HOSPITAL | Age: 72
Discharge: HOME | End: 2022-05-27
Attending: INTERNAL MEDICINE
Payer: MEDICARE

## 2022-05-27 DIAGNOSIS — Z13.6 ENCOUNTER FOR SCREENING FOR ABDOMINAL AORTIC ANEURYSM (AAA) IN PATIENT 50 YEARS OF AGE OR OLDER WITHOUT OTHER RISK FACTORS FOR AAA: ICD-10-CM

## 2022-05-27 PROCEDURE — 76706 US ABDL AORTA SCREEN AAA: CPT

## 2022-05-31 ENCOUNTER — OFFICE VISIT (OUTPATIENT)
Dept: INTERNAL MEDICINE CLINIC | Facility: CLINIC | Age: 72
End: 2022-05-31
Payer: MEDICARE

## 2022-05-31 VITALS
HEART RATE: 83 BPM | SYSTOLIC BLOOD PRESSURE: 120 MMHG | HEIGHT: 67 IN | TEMPERATURE: 98.8 F | OXYGEN SATURATION: 99 % | BODY MASS INDEX: 22.41 KG/M2 | WEIGHT: 142.8 LBS | DIASTOLIC BLOOD PRESSURE: 72 MMHG

## 2022-05-31 DIAGNOSIS — G25.9 MOVEMENT DISORDER: ICD-10-CM

## 2022-05-31 DIAGNOSIS — R55 SYNCOPE AND COLLAPSE: ICD-10-CM

## 2022-05-31 DIAGNOSIS — R41.89 RECURRENT EPISODES OF UNRESPONSIVENESS: Primary | ICD-10-CM

## 2022-05-31 DIAGNOSIS — G25.5 MUSCLE, JERKY MOVEMENTS (UNCONTROLLED): ICD-10-CM

## 2022-05-31 PROCEDURE — 99213 OFFICE O/P EST LOW 20 MIN: CPT | Performed by: INTERNAL MEDICINE

## 2022-05-31 NOTE — PROGRESS NOTES
Assessment/Plan:  Problem List Items Addressed This Visit    None     Visit Diagnoses     Recurrent episodes of unresponsiveness    -  Primary    Relevant Orders    Echo complete w/ contrast if indicated    VAS carotid complete study    Muscle, jerky movements (uncontrolled)        Relevant Orders    Echo complete w/ contrast if indicated    VAS carotid complete study    Movement disorder        Relevant Orders    Echo complete w/ contrast if indicated    VAS carotid complete study    Syncope and collapse         Relevant Orders    VAS carotid complete study           Diagnoses and all orders for this visit:    Recurrent episodes of unresponsiveness  -     Echo complete w/ contrast if indicated; Future  -     VAS carotid complete study; Future    Muscle, jerky movements (uncontrolled)  -     Echo complete w/ contrast if indicated; Future  -     VAS carotid complete study; Future    Movement disorder  -     Echo complete w/ contrast if indicated; Future  -     VAS carotid complete study; Future    Syncope and collapse   -     VAS carotid complete study; Future      No problem-specific Assessment & Plan notes found for this encounter  A/P: Pt with less events per wife, but pt insists he has nothing at all  PE unimpressive again  Labs and CT ok  Still seems like a seizure  Told wife and pt that he really needs to get into neuro and they will now make an appt  Await EEG  Will check carotids and an echo  May need to empirically start meds  Will reach out to neuro to see if they can make an appt for the pt  Subjective:      Patient ID: Alethea Pino is a 67 y o  male  WM RTC for f/u intermittent episodes of periods of unresponsiveness with staring, "clucking", and flapping his arms  ? ?Cause  ?sz vs other  Pt unaware of the events  Labs and CT head were unremarkable  EEG and referral to neuro placed, but have yet to be completed  Wife reports only one event while pt was in bed  No prodrome or warning   Pt unresponsive with eyes open for 5 seconds  Right arm jerking  No slurred speech, facial droop, tongue biting, incontinence, CP, SOB, palpitations or LOC  Family unable to record an episode          The following portions of the patient's history were reviewed and updated as appropriate:   He has no past medical history on file  ,  does not have any pertinent problems on file  ,   has a past surgical history that includes Wrist surgery (Right) and Hernia repair  ,  family history includes Cancer in his brother and mother; Diabetes in his mother; Prostate cancer in his father  ,   reports that he has been smoking cigars  He has never used smokeless tobacco  He reports current alcohol use  No history on file for drug use ,  has No Known Allergies     Current Outpatient Medications   Medication Sig Dispense Refill    atorvastatin (LIPITOR) 10 mg tablet Take 1 tablet (10 mg total) by mouth daily 90 tablet 3     No current facility-administered medications for this visit  Review of Systems   Constitutional: Negative for activity change, chills, diaphoresis, fatigue and fever  Respiratory: Negative for cough, chest tightness, shortness of breath and wheezing  Cardiovascular: Negative for chest pain, palpitations and leg swelling  Gastrointestinal: Negative for abdominal pain, constipation, diarrhea, nausea and vomiting  Genitourinary: Negative for difficulty urinating, dysuria and frequency  Musculoskeletal: Negative for arthralgias, gait problem and myalgias  Neurological: Positive for seizures  Negative for dizziness, tremors, syncope, facial asymmetry, speech difficulty, weakness, light-headedness, numbness and headaches  Psychiatric/Behavioral: Negative for confusion, dysphoric mood and sleep disturbance  The patient is not nervous/anxious          PHQ-2/9 Depression Screening          Objective:  Vitals:    05/31/22 1155   BP: 120/72   Pulse: 83   Temp: 98 8 °F (37 1 °C)   TempSrc: Tympanic   SpO2: 99% Weight: 64 8 kg (142 lb 12 8 oz)   Height: 5' 7" (1 702 m)     Body mass index is 22 37 kg/m²  Physical Exam  Vitals and nursing note reviewed  Constitutional:       General: He is not in acute distress  Appearance: Normal appearance  He is not ill-appearing  HENT:      Head: Normocephalic and atraumatic  Mouth/Throat:      Mouth: Mucous membranes are moist    Eyes:      Extraocular Movements: Extraocular movements intact  Conjunctiva/sclera: Conjunctivae normal       Pupils: Pupils are equal, round, and reactive to light  Neck:      Vascular: No carotid bruit  Cardiovascular:      Rate and Rhythm: Normal rate and regular rhythm  Heart sounds: Normal heart sounds  Pulmonary:      Effort: Pulmonary effort is normal  No respiratory distress  Breath sounds: Normal breath sounds  No wheezing or rales  Abdominal:      General: Bowel sounds are normal  There is no distension  Palpations: Abdomen is soft  Tenderness: There is no abdominal tenderness  Musculoskeletal:      Cervical back: Neck supple  Right lower leg: No edema  Left lower leg: No edema  Neurological:      General: No focal deficit present  Mental Status: He is alert and oriented to person, place, and time  Mental status is at baseline  Cranial Nerves: No cranial nerve deficit  Motor: No weakness  Coordination: Coordination normal       Gait: Gait normal       Deep Tendon Reflexes: Reflexes normal    Psychiatric:         Mood and Affect: Mood normal          Behavior: Behavior normal          Thought Content:  Thought content normal          Judgment: Judgment normal

## 2022-06-23 ENCOUNTER — HOSPITAL ENCOUNTER (OUTPATIENT)
Dept: NON INVASIVE DIAGNOSTICS | Facility: HOSPITAL | Age: 72
Discharge: HOME | End: 2022-06-23
Attending: INTERNAL MEDICINE
Payer: MEDICARE

## 2022-06-23 ENCOUNTER — HOSPITAL ENCOUNTER (OUTPATIENT)
Dept: NEUROLOGY | Facility: HOSPITAL | Age: 72
Discharge: HOME | End: 2022-06-23
Attending: INTERNAL MEDICINE
Payer: MEDICARE

## 2022-06-23 DIAGNOSIS — R41.89 RECURRENT EPISODES OF UNRESPONSIVENESS: ICD-10-CM

## 2022-06-23 DIAGNOSIS — R55 SYNCOPE AND COLLAPSE: ICD-10-CM

## 2022-06-23 DIAGNOSIS — G25.9 MOVEMENT DISORDER: ICD-10-CM

## 2022-06-23 DIAGNOSIS — G25.5 MUSCLE, JERKY MOVEMENTS (UNCONTROLLED): ICD-10-CM

## 2022-06-23 PROCEDURE — 93880 EXTRACRANIAL BILAT STUDY: CPT | Performed by: SURGERY

## 2022-06-23 PROCEDURE — 95816 EEG AWAKE AND DROWSY: CPT

## 2022-06-23 PROCEDURE — 95819 EEG AWAKE AND ASLEEP: CPT | Performed by: PSYCHIATRY & NEUROLOGY

## 2022-06-23 PROCEDURE — 93880 EXTRACRANIAL BILAT STUDY: CPT

## 2022-06-24 ENCOUNTER — TELEPHONE (OUTPATIENT)
Dept: NEUROLOGY | Facility: CLINIC | Age: 72
End: 2022-06-24

## 2022-06-24 DIAGNOSIS — R56.9 SEIZURES (HCC): Primary | ICD-10-CM

## 2022-06-24 RX ORDER — DIVALPROEX SODIUM 250 MG/1
250 TABLET, EXTENDED RELEASE ORAL DAILY
Qty: 90 TABLET | Refills: 1 | Status: SHIPPED | OUTPATIENT
Start: 2022-06-24 | End: 2022-08-09 | Stop reason: DRUGHIGH

## 2022-06-24 NOTE — TELEPHONE ENCOUNTER
Dr Palomares's office called, Pt's PCP and asked if we can provide the pt with a sooner consult appointment due to EEG results  Jessica Jaimes assisted with finding a sooner appointment and pt was give 8/9/22      Thank you,     Beth Mills

## 2022-07-07 ENCOUNTER — TELEPHONE (OUTPATIENT)
Dept: INTERNAL MEDICINE CLINIC | Facility: CLINIC | Age: 72
End: 2022-07-07

## 2022-07-07 DIAGNOSIS — R56.9 SEIZURES (HCC): Primary | ICD-10-CM

## 2022-07-07 NOTE — TELEPHONE ENCOUNTER
Pt's wife called and was wondering what labs need to be done since he started the Depakote  They went to the lab and the technician said that there was nothing was in his chart

## 2022-07-12 ENCOUNTER — HOSPITAL ENCOUNTER (OUTPATIENT)
Dept: NON INVASIVE DIAGNOSTICS | Facility: HOSPITAL | Age: 72
Discharge: HOME | End: 2022-07-12
Attending: INTERNAL MEDICINE
Payer: MEDICARE

## 2022-07-12 VITALS
BODY MASS INDEX: 22.42 KG/M2 | DIASTOLIC BLOOD PRESSURE: 68 MMHG | WEIGHT: 142.86 LBS | HEIGHT: 67 IN | HEART RATE: 76 BPM | SYSTOLIC BLOOD PRESSURE: 124 MMHG

## 2022-07-12 DIAGNOSIS — R41.89 RECURRENT EPISODES OF UNRESPONSIVENESS: ICD-10-CM

## 2022-07-12 DIAGNOSIS — G25.5 MUSCLE, JERKY MOVEMENTS (UNCONTROLLED): ICD-10-CM

## 2022-07-12 DIAGNOSIS — G25.9 MOVEMENT DISORDER: ICD-10-CM

## 2022-07-12 LAB
AORTIC ROOT: 3.5 CM
APICAL FOUR CHAMBER EJECTION FRACTION: 50 %
ASCENDING AORTA: 3.3 CM
E WAVE DECELERATION TIME: 146 MS
FRACTIONAL SHORTENING: 25 % (ref 28–44)
INTERVENTRICULAR SEPTUM IN DIASTOLE (PARASTERNAL SHORT AXIS VIEW): 0.7 CM
INTERVENTRICULAR SEPTUM: 0.7 CM (ref 0.6–1.1)
LAAS-AP4: 18.2 CM2
LEFT ATRIUM SIZE: 3.3 CM
LEFT INTERNAL DIMENSION IN SYSTOLE: 4 CM (ref 2.1–4)
LEFT VENTRICULAR INTERNAL DIMENSION IN DIASTOLE: 5.3 CM (ref 3.5–6)
LEFT VENTRICULAR POSTERIOR WALL IN END DIASTOLE: 0.7 CM
LEFT VENTRICULAR STROKE VOLUME: 68 ML
LVSV (TEICH): 68 ML
MV E'TISSUE VEL-LAT: 7 CM/S
MV E'TISSUE VEL-SEP: 7 CM/S
MV PEAK A VEL: 0.62 M/S
MV PEAK E VEL: 67 CM/S
RIGHT VENTRICLE ID DIMENSION: 2.9 CM
SL CV PED ECHO LEFT VENTRICLE DIASTOLIC VOLUME (MOD BIPLANE) 2D: 138 ML
SL CV PED ECHO LEFT VENTRICLE SYSTOLIC VOLUME (MOD BIPLANE) 2D: 69 ML
TR MAX PG: 21 MMHG
TR PEAK VELOCITY: 2.3 M/S
TRICUSPID VALVE PEAK E WAVE VELOCITY: 0.13 M/S
TRICUSPID VALVE PEAK REGURGITATION VELOCITY: 2.32 M/S

## 2022-07-12 PROCEDURE — 93306 TTE W/DOPPLER COMPLETE: CPT

## 2022-07-12 PROCEDURE — 93306 TTE W/DOPPLER COMPLETE: CPT | Performed by: INTERNAL MEDICINE

## 2022-07-15 ENCOUNTER — APPOINTMENT (OUTPATIENT)
Dept: LAB | Facility: CLINIC | Age: 72
End: 2022-07-15
Payer: MEDICARE

## 2022-07-15 DIAGNOSIS — E78.3 MIXED HYPERGLYCERIDEMIA: ICD-10-CM

## 2022-07-15 DIAGNOSIS — R56.9 SEIZURES (HCC): ICD-10-CM

## 2022-07-15 LAB
ALBUMIN SERPL BCP-MCNC: 3.8 G/DL (ref 3.5–5)
ALP SERPL-CCNC: 55 U/L (ref 46–116)
ALT SERPL W P-5'-P-CCNC: 25 U/L (ref 12–78)
AST SERPL W P-5'-P-CCNC: 14 U/L (ref 5–45)
BILIRUB DIRECT SERPL-MCNC: 0.22 MG/DL (ref 0–0.2)
BILIRUB SERPL-MCNC: 1.13 MG/DL (ref 0.2–1)
PROT SERPL-MCNC: 7 G/DL (ref 6.4–8.2)
VALPROATE SERPL-MCNC: 25 UG/ML (ref 50–100)

## 2022-07-15 PROCEDURE — 80076 HEPATIC FUNCTION PANEL: CPT

## 2022-07-15 PROCEDURE — 36415 COLL VENOUS BLD VENIPUNCTURE: CPT

## 2022-07-15 PROCEDURE — 80164 ASSAY DIPROPYLACETIC ACD TOT: CPT

## 2022-07-19 DIAGNOSIS — R79.9 ABNORMAL BLOOD CHEMISTRY: Primary | ICD-10-CM

## 2022-08-01 ENCOUNTER — TELEPHONE (OUTPATIENT)
Dept: PODIATRY | Facility: CLINIC | Age: 72
End: 2022-08-01

## 2022-08-04 ENCOUNTER — TELEPHONE (OUTPATIENT)
Dept: NEUROLOGY | Facility: CLINIC | Age: 72
End: 2022-08-04

## 2022-08-04 NOTE — TELEPHONE ENCOUNTER
[facility] to confirm patient's 8/9/2022 at 4 pm appointment with Dr Linda Villarreal at the [facility]  Call back number given [phone]

## 2022-08-05 ENCOUNTER — OFFICE VISIT (OUTPATIENT)
Dept: PODIATRY | Facility: CLINIC | Age: 72
End: 2022-08-05
Payer: MEDICARE

## 2022-08-05 VITALS — HEIGHT: 67 IN | WEIGHT: 142 LBS | BODY MASS INDEX: 22.29 KG/M2

## 2022-08-05 DIAGNOSIS — B35.1 ONYCHOMYCOSIS: ICD-10-CM

## 2022-08-05 DIAGNOSIS — M79.675 PAIN IN TOES OF BOTH FEET: Primary | ICD-10-CM

## 2022-08-05 DIAGNOSIS — M79.674 PAIN IN TOES OF BOTH FEET: Primary | ICD-10-CM

## 2022-08-05 DIAGNOSIS — L60.3 NAIL DYSTROPHY: ICD-10-CM

## 2022-08-05 DIAGNOSIS — I73.9 PERIPHERAL VASCULAR DISEASE (HCC): ICD-10-CM

## 2022-08-05 PROCEDURE — 99203 OFFICE O/P NEW LOW 30 MIN: CPT | Performed by: PODIATRIST

## 2022-08-05 PROCEDURE — 11720 DEBRIDE NAIL 1-5: CPT | Performed by: PODIATRIST

## 2022-08-05 NOTE — PROGRESS NOTES
HISTORY AND PHYSICAL EXAM  - [facility]    PATIENT:  Stefany Lemus    1950      Assessment/Plan     Problem List Items Addressed This Visit    None     Visit Diagnoses     Pain in toes of both feet    -  Primary    Onychomycosis        Nail dystrophy        Peripheral vascular disease ([facility] [address] )               Diagnoses and all orders for this visit:    Pain in toes of both feet    Onychomycosis    Nail dystrophy    Peripheral vascular disease ([address] )     -Q 8 findings for nail care, educated on causes of nail dystrophy, nail thickening and we discussed permanent removal versus serial debridement versus fungal treatment   -does take statin and we will avoid oral treatment for nail fungus at this time, and he was not interested in topical management   -he is return in 9 weeks for continued care and at-risk foot care     -discussed shoe gear changes for wider shoes with extra depth to allow less pressure overlying the nail plates   -this previous history of extensive soccer playing likely played a role with his nail damage over time  -Procedure: All mycotic toenails were reduced and debrided in length, width, and girth using a nail nipper and dremel  All hyperkeratotic skin lesion(s) were sharply pared with a scalpel with no bleeding or evidence of ulceration  Patient tolerated procedure(s) well without complications  History of Present Illness   Stefany Lemus is a 67 y o  male who presents with pain in his bilateral big toes, states he is unable to bend down and cut his own toenails and this has been this way for over 5 years  Notes that the toes have now deviated and they are crossing over his lesser toes and there rubbing in his socks and shoes  There is significantly thickened, painful and very elongated  He denies any significant trauma but states that he has played soccer and has worn closed toed shoes for his entire life       Review of Systems   Constitutional: Negative for chills and fever  HENT: Negative for ear pain and sore throat  Eyes: Negative for pain and visual disturbance  Respiratory: Negative for cough and shortness of breath  Cardiovascular: Negative for chest pain and palpitations  Gastrointestinal: Negative for abdominal pain and vomiting  Genitourinary: Negative for dysuria and hematuria  Musculoskeletal: Negative for arthralgias and back pain  Skin: Negative for color change and rash  Neurological: Negative for seizures and syncope  All other systems reviewed and are negative  Historical Information   No past medical history on file  Past Surgical History:   Procedure Laterality Date    HERNIA REPAIR      when he was a kid    WRIST SURGERY Right     when he was a kid     Social History   Social History     Substance and Sexual Activity   Alcohol Use Yes    Comment: two beers/ day and occassional wine     Social History     Substance and Sexual Activity   Drug Use Not on file     Social History     Tobacco Use   Smoking Status Light Tobacco Smoker    Types: Cigars   Smokeless Tobacco Never Used   Tobacco Comment    cigar use     Family History: non-contributory    Meds/Allergies   all medications and allergies reviewed  No Known Allergies    Objective   Vitals: Height 5' 7" (1 702 m), weight 64 4 kg (142 lb)  ,Body mass index is 22 24 kg/m²  Physical Exam  Vitals reviewed  Constitutional:       Appearance: Normal appearance  HENT:      Head: Normocephalic and atraumatic  Nose: Nose normal       Mouth/Throat:      Mouth: Mucous membranes are moist    Eyes:      Pupils: Pupils are equal, round, and reactive to light  Pulmonary:      Effort: Pulmonary effort is normal    Musculoskeletal:         General: Tenderness and deformity present        Comments: Bilateral thickened elongated hallux toenails with notable subungual debris, the remainder of the nails are within normal limits excluding the left 3rd digit which also is thickened elongated with notable subungual debris  There are mild hallux hammertoes to bilateral feet  Skin is shiny and atrophic with absence of digital hair growth  Skin is warm to cool proximal to distal   No callosities   Skin:     Capillary Refill: Capillary refill takes 2 to 3 seconds  Pulses are decreased +1/4 DP bilaterally and +0/4 PT bilaterally  Neurological:      General: No focal deficit present  Mental Status: He is alert and oriented to person, place, and time  Psychiatric:         Mood and Affect: Mood normal          Behavior: Behavior normal          Ortho Exam            Nails x3 b/l 1st, left 3rd   9 wks

## 2022-08-08 NOTE — PATIENT INSTRUCTIONS
Peripheral Artery Disease   AMBULATORY CARE:   Peripheral artery disease (PAD)  is narrow, weak, or blocked arteries  It may affect any arteries outside of your heart and brain  PAD is usually the result of a buildup of fat and cholesterol, also called plaque, along your artery walls  Inflammation, a blood clot, or abnormal cell growth could also block your arteries  PAD prevents normal blood flow to your legs and arms  You are at risk of an amputation if poor blood flow keeps wounds from healing or causes gangrene (tissue death)  Without treatment, PAD can also cause a heart attack or stroke  Common symptoms include:  Mild PAD usually does not cause symptoms  As the disease worsens over time, you may have the following:  Pain or cramps in your leg or hip while you walk    A numb, weak, or heavy feeling in your legs    Dry, scaly, red, or pale skin on your legs    Thick or brittle nails, or hair loss on your arms and legs    Foot sores that will not heal    Burning or aching in your feet and toes while resting (this may be worse when you lie down)    Call your local emergency number (911 in the [address] [facility]) if:   You have any of the following signs of a heart attack:      Squeezing, pressure, or pain in your chest    You may  also have any of the following:     Discomfort or pain in your back, neck, jaw, stomach, or arm    Shortness of breath    Nausea or vomiting    Lightheadedness or a sudden cold sweat    You have any of the following signs of a stroke:      Numbness or drooping on one side of your face     Weakness in an arm or leg    Confusion or difficulty speaking    Dizziness, a severe headache, or vision loss    Seek care immediately if:   You have sores or wounds that will not heal     You notice black or discolored skin on your arm or leg  Your skin is cool to the touch  Call your doctor if:   You have leg pain when you walk 1/8 mile (200 meters) or less, even with treatment      Your legs are red, dry, or pale, even with treatment  You have questions or concerns about your condition or care  Treatment for PAD  can help reduce your risk of a heart attack, stroke, or amputation  You may need more than one of the following:  Medicines  may be given to prevent blood clots and reduce the risk of a heart attack or stroke  You may be given medicine to help prevent your PAD from getting worse  A supervised exercise program  helps you stay active in normal daily activities  Healthcare providers will help you safely walk or do strength training exercises 3 times a week for 30 to 60 minutes  You will do this for several months, then transition to walking on your own  Angioplasty  is a procedure to open your artery so blood can flow through normally  A thin tube called a catheter is used to insert a small balloon into your artery  The balloon is inflated to open your blocked artery, and then removed  A tube called a stent may be placed in your artery to hold it open  Bypass surgery  is used to make a new connection to your artery with a vein from another part of your body, or an artificial graft  The vein or graft is attached to your artery above and below your blockage  This allows blood to flow around the blocked portion of your artery  Manage and prevent PAD:   Walk for 30 to 60 minutes at least 4 times a week  Your healthcare provider may also refer you to a supervised exercise program  The program helps increase how far you can walk without pain  It also helps you stay active in normal daily activities         Do not smoke  Nicotine and other chemicals in cigarettes and cigars can worsen PAD  They can also increase your risk for a heart attack or stroke  Ask your healthcare provider for information if you currently smoke and need help to quit  E-cigarettes or smokeless tobacco still contain nicotine  Talk to your healthcare provider before you use these products  Manage other health conditions  Take your medicines as directed and follow your healthcare provider's instructions if you have high blood pressure or high cholesterol  Perform foot care and check your blood sugar levels as directed if you have diabetes  Eat heart-healthy foods  Eat whole grains, fruits, and vegetables every day  Limit salt and high-fat foods  Ask your healthcare provider for more information on a heart healthy diet  Ask what a healthy weight is for you  Your healthcare provider can help you create a healthy weight-loss plan, if needed  Follow up with your doctor as directed:  Write down your questions so you remember to ask them during your visits  © Copyright [employer] 2022 Information is for End User's use only and may not be sold, redistributed or otherwise used for commercial purposes  All illustrations and images included in CareNotes® are the copyrighted property of A D A "[employer] , Inc  or [facility] Tejinder Lua   The above information is an  only  It is not intended as medical advice for individual conditions or treatments  Talk to your doctor, nurse or pharmacist before following any medical regimen to see if it is safe and effective for you

## 2022-08-09 ENCOUNTER — CONSULT (OUTPATIENT)
Dept: NEUROLOGY | Facility: CLINIC | Age: 72
End: 2022-08-09
Payer: MEDICARE

## 2022-08-09 VITALS
TEMPERATURE: 98.6 F | WEIGHT: 142 LBS | DIASTOLIC BLOOD PRESSURE: 84 MMHG | SYSTOLIC BLOOD PRESSURE: 118 MMHG | HEIGHT: 67 IN | HEART RATE: 78 BPM | BODY MASS INDEX: 22.29 KG/M2

## 2022-08-09 DIAGNOSIS — R56.9 SEIZURE (HCC): Primary | ICD-10-CM

## 2022-08-09 DIAGNOSIS — R41.89 RECURRENT EPISODES OF UNRESPONSIVENESS: ICD-10-CM

## 2022-08-09 DIAGNOSIS — G25.5 MUSCLE, JERKY MOVEMENTS (UNCONTROLLED): ICD-10-CM

## 2022-08-09 DIAGNOSIS — Z12.11 ENCOUNTER FOR SCREENING FOR COLORECTAL MALIGNANT NEOPLASM: ICD-10-CM

## 2022-08-09 DIAGNOSIS — G25.9 MOVEMENT DISORDER: ICD-10-CM

## 2022-08-09 DIAGNOSIS — Z12.12 ENCOUNTER FOR SCREENING FOR COLORECTAL MALIGNANT NEOPLASM: ICD-10-CM

## 2022-08-09 PROCEDURE — 99204 OFFICE O/P NEW MOD 45 MIN: CPT | Performed by: PSYCHIATRY & NEUROLOGY

## 2022-08-09 RX ORDER — DIVALPROEX SODIUM 500 MG/1
500 TABLET, DELAYED RELEASE ORAL EVERY 12 HOURS SCHEDULED
Qty: 60 TABLET | Refills: 2 | Status: SHIPPED | OUTPATIENT
Start: 2022-08-09 | End: 2022-09-08
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
## 2022-08-09 NOTE — ASSESSMENT & PLAN NOTE
65yo M w/ a PMH of hyperbilirubin, smoker, and HLD coming in for evaluation of seizures since since 6 months ago  His EEG was abnormal temporal wave discharges and was started on Depakote 250mg daily and then increased to BID  He still has 3-4 episodes/week lasting 10-15 seconds and is confused for 10-15 minutes  Semiology: 10-15 seconds of R arm movement and mouth movements where patient is unaware of the episode  W/U  This Routine EEG recorded during wakefulness, drowsiness, and sleep is abnormal  Occasional left anterior temporal sharp wave discharges are a potential source of seizures  04/2022 CTH: No mass effect, acute intracranial hemorrhage or evidence of recent infarction      Impression: seizure given semiology is consistent every time     Plan   - will increase on depakote 500mg BID   - discussed side effects of hair loss and rash that needs to be watched out for and to stop med and let office know immediately    - will let PCP continue to monitor LFTs and platelets as these should be monitored 1-2x/year  - discussed no driving and will submit [employer] Form today 08/09/2022  - discussed decreasing beer consumption, risky activities such as cooking with knives, heavy machinery, swimming in a pool as activities to stop/avoid  - 3-4 month f/u

## 2022-08-16 ENCOUNTER — OFFICE VISIT (OUTPATIENT)
Dept: INTERNAL MEDICINE CLINIC | Facility: CLINIC | Age: 72
End: 2022-08-16
Payer: MEDICARE

## 2022-08-16 VITALS
TEMPERATURE: 97 F | SYSTOLIC BLOOD PRESSURE: 120 MMHG | OXYGEN SATURATION: 98 % | DIASTOLIC BLOOD PRESSURE: 68 MMHG | HEIGHT: 67 IN | WEIGHT: 142.5 LBS | BODY MASS INDEX: 22.37 KG/M2 | HEART RATE: 85 BPM

## 2022-08-16 DIAGNOSIS — R41.89 RECURRENT EPISODES OF UNRESPONSIVENESS: ICD-10-CM

## 2022-08-16 DIAGNOSIS — R56.9 SEIZURE (HCC): Primary | ICD-10-CM

## 2022-08-16 PROCEDURE — 99213 OFFICE O/P EST LOW 20 MIN: CPT | Performed by: INTERNAL MEDICINE

## 2022-08-16 NOTE — PROGRESS NOTES
Assessment/Plan:  Problem List Items Addressed This Visit        Other    Seizure ([facility] [address] ) - Primary    Recurrent episodes of unresponsiveness           Diagnoses and all orders for this visit:    Seizure ([facility] [address] )    Recurrent episodes of unresponsiveness      No problem-specific Assessment & Plan notes found for this encounter  A/P: Doing well and tolerating the meds  Appreciate neuro input  Continue current treatment and RTC two months for routine  Subjective:      Patient ID: Peterson Roy is a 67 y o  male  WM RTC for f/u unresponsive episodes felt to be due to sz  EEG was abnormal  Started on depekote and some improvement  Sent to neuro and agree that the pt was having sz and med adjusted  Doing better  No episodes  Tolerating the meds  No new issues  The following portions of the patient's history were reviewed and updated as appropriate:   He has no past medical history on file  ,  does not have any pertinent problems on file  ,   has a past surgical history that includes Wrist surgery (Right) and Hernia repair  ,  family history includes Cancer in his brother and mother; Diabetes in his mother; Prostate cancer in his father  ,   reports that he has been smoking cigars  He has never used smokeless tobacco  He reports current alcohol use  No history on file for drug use ,  has No Known Allergies     Current Outpatient Medications   Medication Sig Dispense Refill    atorvastatin (LIPITOR) 10 mg tablet Take 1 tablet (10 mg total) by mouth daily 90 tablet 3    divalproex sodium (Depakote) 500 mg EC tablet Take 1 tablet (500 mg total) by mouth every 12 (twelve) hours 60 tablet 2     No current facility-administered medications for this visit  Review of Systems   Constitutional: Negative for activity change, chills, diaphoresis, fatigue and fever  HENT: Negative  Eyes: Negative for visual disturbance  Respiratory: Negative for cough, chest tightness, shortness of breath and wheezing  Cardiovascular: Negative for chest pain, palpitations and leg swelling  Gastrointestinal: Negative for abdominal pain, constipation, diarrhea, nausea and vomiting  Endocrine: Negative for cold intolerance and heat intolerance  Genitourinary: Negative for difficulty urinating, dysuria and frequency  Musculoskeletal: Negative for arthralgias, gait problem and myalgias  Neurological: Negative for dizziness, seizures, syncope, weakness, light-headedness and headaches  Psychiatric/Behavioral: Negative for confusion  The patient is not nervous/anxious  PHQ-2/9 Depression Screening          Objective:  Vitals:    08/16/22 1052   BP: 120/68   Pulse: 85   Temp: (!) 97 °F (36 1 °C)   SpO2: 98%   Weight: 64 6 kg (142 lb 8 oz)   Height: 5' 7" (1 702 m)     Body mass index is 22 32 kg/m²  Physical Exam  Vitals and nursing note reviewed  Constitutional:       General: He is not in acute distress  Appearance: Normal appearance  He is not ill-appearing  HENT:      Head: Normocephalic and atraumatic  Mouth/Throat:      Mouth: Mucous membranes are moist    Eyes:      Extraocular Movements: Extraocular movements intact  Conjunctiva/sclera: Conjunctivae normal       Pupils: Pupils are equal, round, and reactive to light  Neck:      Vascular: No carotid bruit  Cardiovascular:      Rate and Rhythm: Normal rate and regular rhythm  Heart sounds: Normal heart sounds  Pulmonary:      Effort: Pulmonary effort is normal  No respiratory distress  Breath sounds: Normal breath sounds  No wheezing or rales  Abdominal:      General: Bowel sounds are normal  There is no distension  Palpations: Abdomen is soft  Tenderness: There is no abdominal tenderness  Musculoskeletal:      Cervical back: Neck supple  Right lower leg: No edema  Left lower leg: No edema  Neurological:      General: No focal deficit present        Mental Status: He is alert and oriented to person, place, and time  Mental status is at baseline  Psychiatric:         Mood and Affect: Mood normal          Behavior: Behavior normal          Thought Content:  Thought content normal          Judgment: Judgment normal

## 2022-10-07 ENCOUNTER — OFFICE VISIT (OUTPATIENT)
Dept: PODIATRY | Facility: CLINIC | Age: 72
End: 2022-10-07
Payer: MEDICARE

## 2022-10-07 VITALS — WEIGHT: 142 LBS | BODY MASS INDEX: 22.29 KG/M2 | HEIGHT: 67 IN

## 2022-10-07 DIAGNOSIS — M79.674 PAIN IN TOES OF BOTH FEET: Primary | ICD-10-CM

## 2022-10-07 DIAGNOSIS — M79.675 PAIN IN TOES OF BOTH FEET: Primary | ICD-10-CM

## 2022-10-07 DIAGNOSIS — L84 CALLUS: ICD-10-CM

## 2022-10-07 DIAGNOSIS — B35.1 ONYCHOMYCOSIS: ICD-10-CM

## 2022-10-07 PROCEDURE — 11720 DEBRIDE NAIL 1-5: CPT | Performed by: PODIATRIST

## 2022-10-07 PROCEDURE — 11056 PARNG/CUTG B9 HYPRKR LES 2-4: CPT | Performed by: PODIATRIST

## 2022-10-07 NOTE — PROGRESS NOTES
Kyung Christine  1950  AT RISK FOOT CARE    1  Pain in toes of both feet     2  Onychomycosis     3  Callus         Patient presents for at-risk foot care  Patient has no acute concerns today  Patient has significant lower extremity risk due to diminished pulses in the feet and trophic skin changes to the lower extremity including thick toenail, atrophic skin, and decreased hair growth  On exam patient has thickened, hypertrophic, discolored, brittle toenails with subungual debris and tenderness x3   Callus: 2, new onset callosity to medial aspect of b/l first met head  Patient has diminished pedal pulses and decreased perfusion to the lower extremities  Patient has significant trophic changes to the skin including thick toenails, decreased pedal hair and atrophic skin  Today's treatment includes:  Debridement of toenails  Using nail nipper, hugo, and curette, nails were sharply debrided, reduced in thickness and length  Devitalized nail tissue and fungal debris excised and removed  Patient tolerated well  Procedure: All mycotic toenails were reduced and debrided in length, width, and girth using a nail nipper and dremel  All hyperkeratotic skin lesion(s) were sharply pared with a scalpel with no bleeding or evidence of ulceration  Patient tolerated procedure(s) well without complications  Discussed proper shoe gear, daily inspections of feet, and general foot health with patient  Patient has Q8  findings and is recommended for at risk foot care every 9-10 weeks      Patients most recent complete clinical foot exam was on: 8/5

## 2022-10-18 ENCOUNTER — OFFICE VISIT (OUTPATIENT)
Dept: INTERNAL MEDICINE CLINIC | Facility: CLINIC | Age: 72
End: 2022-10-18
Payer: MEDICARE

## 2022-10-18 VITALS
HEART RATE: 96 BPM | SYSTOLIC BLOOD PRESSURE: 132 MMHG | HEIGHT: 67 IN | BODY MASS INDEX: 22.66 KG/M2 | DIASTOLIC BLOOD PRESSURE: 74 MMHG | OXYGEN SATURATION: 96 % | TEMPERATURE: 97.9 F | WEIGHT: 144.38 LBS

## 2022-10-18 DIAGNOSIS — R56.9 SEIZURE (HCC): ICD-10-CM

## 2022-10-18 DIAGNOSIS — E80.6 HYPERBILIRUBINEMIA: ICD-10-CM

## 2022-10-18 DIAGNOSIS — F17.290 CIGAR SMOKER: ICD-10-CM

## 2022-10-18 DIAGNOSIS — Z23 ENCOUNTER FOR VACCINATION: ICD-10-CM

## 2022-10-18 DIAGNOSIS — E78.2 MIXED HYPERLIPIDEMIA: Primary | ICD-10-CM

## 2022-10-18 PROCEDURE — 99214 OFFICE O/P EST MOD 30 MIN: CPT | Performed by: INTERNAL MEDICINE

## 2022-10-18 PROCEDURE — G0008 ADMIN INFLUENZA VIRUS VAC: HCPCS | Performed by: INTERNAL MEDICINE

## 2022-10-18 PROCEDURE — 90662 IIV NO PRSV INCREASED AG IM: CPT | Performed by: INTERNAL MEDICINE

## 2022-10-18 NOTE — PROGRESS NOTES
Assessment/Plan:  Problem List Items Addressed This Visit        Other    Seizure ([facility] [address] )    Relevant Orders    Valproic acid level, total    Mixed hyperlipidemia - Primary    Relevant Orders    Comprehensive metabolic panel    LDL cholesterol, direct    Triglycerides    Hyperbilirubinemia    Cigar smoker      Other Visit Diagnoses     Encounter for vaccination        Relevant Orders    influenza vaccine, high-dose, PF 0 5 mL           Diagnoses and all orders for this visit:    Mixed hyperlipidemia  -     Comprehensive metabolic panel; Future  -     LDL cholesterol, direct; Future  -     Triglycerides; Future    Hyperbilirubinemia    Seizure (HCC)  -     Valproic acid level, total; Future    Cigar smoker    Encounter for vaccination  -     influenza vaccine, high-dose, PF 0 5 mL      No problem-specific Assessment & Plan notes found for this encounter  A/P: Doing ok and will check labs, especially his levels given continued sz  May need an adjustment or additional meds  Discussed vaccines will up date his flu vaccine    Limit cigar use  Continue current treatment and RTC six months  Reminded to not drive, operate machinery, or work at heights at this time  Subjective:      Patient ID: Mary Robles is a 67 y o  male  WM RTC for f/u hyperlipidemia, sz, etc  Doing ok and no new issues  Remains active w/o difficultly and no falls  Reports a mild  sz activity last week    Still smokes the occasional cigar  Due for labs and vaccines  The following portions of the patient's history were reviewed and updated as appropriate:   He has no past medical history on file  ,  does not have any pertinent problems on file  ,   has a past surgical history that includes Wrist surgery (Right) and Hernia repair  ,  family history includes Cancer in his brother and mother; Diabetes in his mother; Prostate cancer in his father  ,   reports that he has been smoking cigars   He has never used smokeless tobacco  He reports current alcohol use  No history on file for drug use ,  has No Known Allergies     Current Outpatient Medications   Medication Sig Dispense Refill   • atorvastatin (LIPITOR) 10 mg tablet Take 1 tablet (10 mg total) by mouth daily 90 tablet 3   • divalproex sodium (Depakote) 500 mg EC tablet Take 1 tablet (500 mg total) by mouth every 12 (twelve) hours 60 tablet 2     No current facility-administered medications for this visit  Review of Systems   Constitutional: Negative for activity change, chills, diaphoresis, fatigue and fever  HENT: Negative  Eyes: Negative for visual disturbance  Respiratory: Negative for cough, chest tightness, shortness of breath and wheezing  Cardiovascular: Negative for chest pain, palpitations and leg swelling  Gastrointestinal: Negative for abdominal pain, constipation, diarrhea, nausea and vomiting  Endocrine: Negative for cold intolerance and heat intolerance  Genitourinary: Negative for difficulty urinating, dysuria and frequency  Musculoskeletal: Negative for arthralgias, gait problem and myalgias  Neurological: Negative for dizziness, seizures, syncope, weakness, light-headedness and headaches  Psychiatric/Behavioral: Negative for confusion, dysphoric mood and sleep disturbance  The patient is not nervous/anxious  PHQ-2/9 Depression Screening          Objective:  Vitals:    10/18/22 1302   BP: 132/74   Pulse: 96   Temp: 97 9 °F (36 6 °C)   SpO2: 96%   Weight: 65 5 kg (144 lb 6 oz)   Height: 5' 7" (1 702 m)     Body mass index is 22 61 kg/m²  Physical Exam  Vitals and nursing note reviewed  Constitutional:       General: He is not in acute distress  Appearance: Normal appearance  He is not ill-appearing  HENT:      Head: Normocephalic and atraumatic  Mouth/Throat:      Mouth: Mucous membranes are moist    Eyes:      Extraocular Movements: Extraocular movements intact        Conjunctiva/sclera: Conjunctivae normal       Pupils: Pupils are equal, round, and reactive to light  Neck:      Vascular: No carotid bruit  Cardiovascular:      Rate and Rhythm: Normal rate and regular rhythm  Heart sounds: Normal heart sounds  Pulmonary:      Effort: Pulmonary effort is normal  No respiratory distress  Breath sounds: Normal breath sounds  No wheezing, rhonchi or rales  Abdominal:      General: Bowel sounds are normal  There is no distension  Palpations: Abdomen is soft  Tenderness: There is no abdominal tenderness  Musculoskeletal:      Cervical back: Neck supple  Right lower leg: No edema  Left lower leg: No edema  Neurological:      General: No focal deficit present  Mental Status: He is alert and oriented to person, place, and time  Mental status is at baseline  Psychiatric:         Mood and Affect: Mood normal          Behavior: Behavior normal          Thought Content:  Thought content normal          Judgment: Judgment normal

## 2022-10-18 NOTE — PATIENT INSTRUCTIONS
Hyperlipidemia   WHAT YOU NEED TO KNOW:   What is hyperlipidemia? Hyperlipidemia is a high level of lipids (fats) in your blood  These lipids include cholesterol or triglycerides  Lipids are made by your body  They also come from the foods you eat  Your body needs lipids to work properly, but high levels increase your risk for heart disease, heart attack, and stroke  What increases my risk for hyperlipidemia? Family history of high lipid levels    Diet high in saturated fats, cholesterol, or calories    High alcohol intake or smoking    Lack of regular physical activity    Medical conditions such as hypothyroidism, obesity, or type 2 diabetes    Certain medicines, such as blood pressure medicines, hormones, and steroids    How is hyperlipidemia managed and treated? Your healthcare provider may first recommend that you make lifestyle changes to help decrease your lipid levels  Your provider may recommend you work with a team to manage hyperlipidemia  The team may include medical experts such as a dietitian, an exercise or physical therapist, and a behavior therapist  Your family members may be included in helping you create lifestyle changes  You may also need to take medicine to lower your lipid levels  Some of the lifestyle changes you may need to make include the following:  Maintain a healthy weight  Ask your healthcare provider what a healthy weight is for you  Ask him or her to help you create a weight loss plan if you are overweight  Weight loss can decrease your cholesterol and triglyceride levels  Be physically active throughout the day  Physical activity, such as exercise, lowers your cholesterol levels and helps you maintain a healthy weight  Get 30 minutes or more of aerobic exercise 4 to 6 days each week  You can split your exercise into four 10-minute workouts instead of 30 minutes at one time  Examples of aerobic exercises include walking briskly, swimming, or riding a bike   Work with your healthcare provider to plan the best exercise program for you  Also include strength training at least 2 times each week  Your healthcare providers can help you create a physical activity plan  Do not smoke  Nicotine and other chemicals in cigarettes and cigars can increase your risk for a heart attack and stroke  Ask your healthcare provider for information if you currently smoke and need help to quit  E-cigarettes or smokeless tobacco still contain nicotine  Talk to your healthcare provider before you use these products  Eat heart-healthy foods  A dietitian or your provider can give you more information on low-sodium plans or the DASH (Dietary Approaches to Stop Hypertension) eating plan  The DASH plan is low in sodium, processed sugar, unhealthy fats, and total fat  It is high in potassium, calcium, and fiber  It is high in potassium, calcium, and fiber  These can be found in vegetables, fruit, and whole-grain foods  The following are ways to get more heart-healthy foods:         Decrease the total amount of fat you eat  Choose lean meats, fat-free or 1% fat milk, and low-fat dairy products, such as yogurt and cheese  Limit or do not eat red meat  Red meats are high in fat and cholesterol  Replace unhealthy fats with healthy fats  Unhealthy fats include saturated fat, trans fat, and cholesterol  Choose soft margarines that are low in saturated fat and have little or no trans fat  Monounsaturated fats are healthy fats  These are found in olive oil, canola oil, avocado, and nuts  Polyunsaturated fats are also healthy  These are found in fish, flaxseed, walnuts, and soybeans  Eat 5 or more servings of fruits and vegetables every day  They are low in calories and fat, and a good source of essential vitamins  Include dark green, red, and orange vegetables  Examples include spinach, kale, broccoli, and carrots  Eat foods high in fiber  Fiber can help lower your cholesterol levels  Choose whole grain, high-fiber foods  Good choices include whole-wheat breads or cereals, beans, peas, fruits, and vegetables  Limit sodium (salt) as directed  Too much sodium can affect your fluid balance and blood pressure  Your healthcare provider will tell you how much sodium and potassium are safe for you to have in a day  He or she may recommend that you limit sodium to 2,300 mg a day  Your provider or a dietitian can help you find ways to limit sodium  For example, if you add salt while you cook, do not add more salt at the table  Check labels to find low-sodium or no-salt-added foods  Some low-sodium foods use potassium salts for flavor  Too much potassium can also cause health problems  Ask your healthcare provider if it is okay for you to drink alcohol  Alcohol can increase your cholesterol and triglyceride levels  Your provider can tell you how many drinks are okay to have within 24 hours and within 1 week  A drink of alcohol is 12 ounces of beer, 5 ounces of wine, or 1½ ounces of liquor  Call your local emergency number (911 in the [address] [place] [address],[address]) or have someone call if:   You have any of the following signs of a heart attack:      Squeezing, pressure, or pain in your chest    You may  also have any of the following:     Discomfort or pain in your back, neck, jaw, stomach, or arm    Shortness of breath    Nausea or vomiting    Lightheadedness or a sudden cold sweat    You have any of the following signs of a stroke:      Numbness or drooping on one side of your face     Weakness in an arm or leg    Confusion or difficulty speaking    Dizziness, a severe headache, or vision loss    When should I call my doctor? You have questions or concerns about your condition or care  CARE AGREEMENT:   You have the right to help plan your care  Learn about your health condition and how it may be treated  Discuss treatment options with your healthcare providers to decide what care you want to receive   You always have the right to refuse treatment  The above information is an  only  It is not intended as medical advice for individual conditions or treatments  Talk to your doctor, nurse or pharmacist before following any medical regimen to see if it is safe and effective for you  © Copyright [employer] 2022 Information is for End User's use only and may not be sold, redistributed or otherwise used for commercial purposes   All illustrations and images included in CareNotes® are the copyrighted property of A D A M , Inc  or [address] [place] [address]

## 2022-10-19 ENCOUNTER — APPOINTMENT (OUTPATIENT)
Dept: LAB | Facility: CLINIC | Age: 72
End: 2022-10-19
Payer: MEDICARE

## 2022-10-19 DIAGNOSIS — R79.9 ABNORMAL BLOOD CHEMISTRY: ICD-10-CM

## 2022-10-19 DIAGNOSIS — R56.9 SEIZURE (HCC): ICD-10-CM

## 2022-10-19 DIAGNOSIS — E78.2 MIXED HYPERLIPIDEMIA: ICD-10-CM

## 2022-10-19 LAB
ALBUMIN SERPL BCP-MCNC: 3.7 G/DL (ref 3.5–5)
ALP SERPL-CCNC: 47 U/L (ref 46–116)
ALT SERPL W P-5'-P-CCNC: 29 U/L (ref 12–78)
ANION GAP SERPL CALCULATED.3IONS-SCNC: 2 MMOL/L (ref 4–13)
AST SERPL W P-5'-P-CCNC: 12 U/L (ref 5–45)
BILIRUB SERPL-MCNC: 1.44 MG/DL (ref 0.2–1)
BUN SERPL-MCNC: 14 MG/DL (ref 5–25)
CALCIUM SERPL-MCNC: 9.3 MG/DL (ref 8.3–10.1)
CHLORIDE SERPL-SCNC: 107 MMOL/L (ref 96–108)
CO2 SERPL-SCNC: 30 MMOL/L (ref 21–32)
CREAT SERPL-MCNC: 1.08 MG/DL (ref 0.6–1.3)
GFR SERPL CREATININE-BSD FRML MDRD: 68 ML/MIN/1.73SQ M
GLUCOSE P FAST SERPL-MCNC: 98 MG/DL (ref 65–99)
LDLC SERPL DIRECT ASSAY-MCNC: 59 MG/DL (ref 0–100)
POTASSIUM SERPL-SCNC: 4.1 MMOL/L (ref 3.5–5.3)
PROT SERPL-MCNC: 7.2 G/DL (ref 6.4–8.4)
SODIUM SERPL-SCNC: 139 MMOL/L (ref 135–147)
TRIGL SERPL-MCNC: 113 MG/DL
VALPROATE SERPL-MCNC: 78 UG/ML (ref 50–100)

## 2022-10-19 PROCEDURE — 80164 ASSAY DIPROPYLACETIC ACD TOT: CPT

## 2022-10-19 PROCEDURE — 84478 ASSAY OF TRIGLYCERIDES: CPT

## 2022-10-19 PROCEDURE — 36415 COLL VENOUS BLD VENIPUNCTURE: CPT

## 2022-10-19 PROCEDURE — 83721 ASSAY OF BLOOD LIPOPROTEIN: CPT

## 2022-10-19 PROCEDURE — 80053 COMPREHEN METABOLIC PANEL: CPT

## 2022-11-17 DIAGNOSIS — R56.9 SEIZURE (HCC): ICD-10-CM

## 2022-11-17 RX ORDER — DIVALPROEX SODIUM 500 MG/1
500 TABLET, DELAYED RELEASE ORAL EVERY 12 HOURS SCHEDULED
Qty: 180 TABLET | Refills: 3 | Status: SHIPPED | OUTPATIENT
Start: 2022-11-17

## 2022-11-17 NOTE — TELEPHONE ENCOUNTER
Yes if you can call Myles Escoto  [phone]  He is going to be needing a new refill on the medication  Birthday is April 9th,1950  There's no refill  Please call us back   Thank you

## 2022-11-17 NOTE — TELEPHONE ENCOUNTER
Spoke with pt and he states that he takes Depakote 500 mg BID  Says he no longer has seizures and feels great  Pt states that he has only 10 pills left and needs a refill  Next OV 12/6/22 with Dr Itz Mendoza - Rx entered  Please review and sign if in agreement

## 2022-12-01 ENCOUNTER — TELEPHONE (OUTPATIENT)
Dept: NEUROLOGY | Facility: CLINIC | Age: 72
End: 2022-12-01

## 2022-12-01 NOTE — TELEPHONE ENCOUNTER
Spoke with patient's spouse to confirm patient's 12/6/2022 appointment with Dr Cooper Cogan at the [facility]

## 2022-12-06 ENCOUNTER — OFFICE VISIT (OUTPATIENT)
Dept: NEUROLOGY | Facility: CLINIC | Age: 72
End: 2022-12-06

## 2022-12-06 VITALS
DIASTOLIC BLOOD PRESSURE: 88 MMHG | HEIGHT: 67 IN | HEART RATE: 99 BPM | TEMPERATURE: 97.9 F | SYSTOLIC BLOOD PRESSURE: 128 MMHG | BODY MASS INDEX: 22.91 KG/M2 | WEIGHT: 146 LBS

## 2022-12-06 DIAGNOSIS — R56.9 SEIZURE (HCC): Primary | ICD-10-CM

## 2022-12-06 RX ORDER — DIVALPROEX SODIUM 500 MG/1
500 TABLET, DELAYED RELEASE ORAL EVERY 12 HOURS SCHEDULED
Qty: 180 TABLET | Refills: 3 | Status: SHIPPED | OUTPATIENT
Start: 2022-12-06

## 2022-12-06 NOTE — PATIENT INSTRUCTIONS
- continue depakote 500mg BID   - see in 4 month f/u for re-evaluation evaluate if 6 months seizure free   -  will let PCP continue to monitor LFTs and platelets as these should be monitored 1-2x/year  - discussed stopping beer consumption and recommended to completely stop alcohol consumption           FIRST AID FOR SEIZURES    What to Do If You Witness a Seizure:     Generalized convulsive (called a generalized tonic-clonic or grand mal seizure)  During this seizure, the person may cry out, suddenly stiffen up, make jerking movements, and fall  The person may turn pale or blue from difficulty breathing  Actions:  Stay calm  Talk in a soothing voice and if possible keep onlookers away  Prevent injury  Move objects away that the person might hit while jerking uncontrollably  Time when the seizure starts and ends  Most seizures stop after only a few minutes  Turn him or her gently onto one side  This will help keep the airway clear  Never place anything in his/her mouth or give him/her anything by mouth during a seizure  -- Do not give the person water, pills, or food until fully alert  Loosen tight clothing or jewelry around his/her neck  Make the person as comfortable as possible  Place something soft under their head  Do not hold the person down  If the person having a seizure thrashes around there is no need for you to restrain them  They are more likely to be combative if restrained  Remember to consider your safety as well  Keep onlookers away  Be sensitive and supportive, and ask others to do the same  Stay with the person until he/she is fully alert  Complex partial seizure (confusional spells)  During this kind of seizure, the person may have a glassy stare; give no response or inappropriate responses when questioned; sit, stand, or walk about aimlessly; make lip smacking or chewing motions; fidget with clothes; appear to be drunk, drugged, or confused      Actions:  Make sure the person is safe and won’t harm themselves  Try to remove harmful objects from around the person (tools, utensils, glasses)  Do NOT be aggressive or attempt to restrain the person  They are more likely to be combative if restrained  Remember to consider your safety as well  Help prevent the person from wandering, and direct the person to chair or safe position  Never place anything in his/her mouth or give him/her anything by mouth during a seizure  -- Do not give the person water, pills, or food until fully alert  Keep onlookers away  Be sensitive and supportive, and ask others to do the same  Stay with the person until he/she is fully alert  CALL 911 if:  A convulsive seizure lasts more than 5 minutes  The person turns blue during the seizure  The person does not start breathing after the seizure  Begin mouth to mouth resuscitation if this would occur  The person has one seizure right after the other without coming back to normal consciousness between the seizures  The person has not regained consciousness or is still confused after 30 minutes  You know the person does not have epilepsy  You know the person has diabetes or low blood sugar  The person is pregnant, ill, or injured  The seizure occurred in water, because the person may have inhaled water  The person requests an ambulance or medical help  Rescue medication  Your doctor may prescribe a rescue medication such as lorazepam (Ativan), diazepam (Valium / Diastat), or clonazepam (Klonopin) to terminate a seizure or if you have a history of cluster of seizures  Follow the instructions given by your doctor for these medications    Recognizing Common Seizures (examples)   Simple partial seizures: Isolated twitching, numbness, sweating, dizziness, nausea/vomiting, disturbances to hearing, vision, smell or taste  No loss of consciousness occurs, and the person remains aware of his/her environment     Complex partial seizures: Staring, motionless, picking at clothes, smacking lips, swallowing repeatedly or wandering around  The person is not aware of their surroundings and is not fully responsive  Atonic seizures: “Drop attacks” or sudden, rapid fall to ground with rapid recovery  Myoclonic seizures: Brief forceful jerks which can affect the whole body or just part of it  Absence seizures: May appear to be daydreaming or “spacing out ” The person is momentarily unresponsive and unaware of what is happening around him/her  Tonic seizures: Stiffening of part or of the entire body  Generalized Tonic-Clonic Seizures  “Grand-mal seizure ” Sudden loss of consciousness with body stiffening followed by continuous jerking movements  A blue tinge around the mouth is likely but lack of oxygen is rare  Loss of bladder and/or bowel control may occur  SEIZURE SAFETY    Don’t let fear of seizures keep you at home  Be smart about your activities to make sure you are safe  The guidelines below can help you be as safe as possible  Make sure the people around you are aware of: What happens when you have a seizure  Correct seizure first aid  First aid for choking (consider taking a basic life support class)  When they should know to call 911 or for medical help    Avoid common triggers of seizures:  Missing your medications  Not getting enough sleep  Drinking alcohol  Using illegal drugs    Safety measures for at home:  In the bathroom:   Do not take baths in the bathtub  Instead, take only showers  Try to have a family member available while you are in the shower  Make sure the drain in the bathtub/shower is working properly to avoid pooling of water  You can consider a fitted shower seat  Recessed soap trays can minimize injury if you would happen to fall in the shower  Bathroom doors can be hung to open outwards, so that the door can still be opened if you fall against the door  Do not lock the bathroom door   Use an “Occupied” sign on the door or other signal to let others know you are in the bathroom  Safety locks can be obtained from the [facility]  On your water heater, set your water temperature to a warm temperature that is not scalding to avoid burns from very hot water  Put non-skid strips/ in the bathtub  Use an electric shaver  Avoid any electrical appliances (including electric shaver) in the bathroom or near water  Use shatterproof glass for mirrors  In the kitchen:   When possible, cook using a microwave  Only cook or use electrical appliances when someone else is in the house and available  As much as possible, grill food and avoid fryers or frying food  Use the back burners of the stove and turn the pot handles toward the back of the stove  Avoid carrying hot pans, pots of boiling water, or very hot food  Serve food or liquids directly from the stove  At the least, minimize the distance hot food is carried  Use precut foods or food processers to limit the need to use knives  Use plastic or durable cups, dishes, and containers instead of breakable glass items  In the bedroom  Low level and wide beds (like a futon) can reduce risk of injury of falling out of bed  If there is a high risk of falling out of bed, you can consider simply putting the mattress on the floor  Avoid sleeping on top bunks of bunk beds  Place a soft carpet on the floor  Around the house  Pad sharp corners of tables, chairs, etc  Round tables and furniture can be considered to avoid sharp corners  Avoid open flames  Place a screen in front of fireplaces and don’t build a fire alone  Allow for open spaces with furniture  Avoid loose throw rugs or slippery floors  Non-slip dayami or cushioned dayami can help reduce injury from a fall  Fireproof fabrics and furniture are best, and especially important if you smoke  Doors and windows with safety glass are safer if someone falls against them    Avoid lights and heaters that could easily be knocked over  Use curling irons or clothing irons that have automatic shut off switches  Make sure motor driven equipment or lawn mowers have “dead man’s” handles or seats so they will turn off if you release pressure  Safety measures when away from home  [address]  law mandates that you cannot drive for 6 months after your most recent seizure  [place] law mandates that you cannot drive for 6 months after your most recent seizure  Work/Travel  Wear a medical alert bracelet or necklace at all times  Wear a helmet and use protective clothing/equipment when appropriate  Consider telling co-workers and travel companions that you have epilepsy and what to do if you have a seizure  Avoid irregular shifts or disruptions of a regular sleep pattern  Take your medications on time and keep an updated list of medications in your purse or wallet  Do not climb to heights or operate heavy machinery  Stand back from the edges of roads or bus/train platforms when traveling  If you wander when you have a seizure, take a friend along for the trip  Recreation  Swimming can be dangerous  Do not swim alone, in open water, or in murky water that you cannot see the bottom  Caregivers should be with you in the pool at all times and must be physically able to get you out of the water  Use a flotation device  Scuba diving is not recommended since during a seizure people are unaware of their surroundings  Having a seizure underwater can be deadly and can endanger the lives of others  Kayaking and canoeing can be especially dangerous  People with epilepsy are at a higher risk of becoming trapped underneath a canoe or kayak  Wear a helmet when playing contact sports, biking, or if there is a risk of falling  Patients with epilepsy are at a higher risk of head injury when playing contact sports    Avoid riding a bike, running, or other activities around busy roads, steep hills, or secluded areas  Exercise on soft surfaces  Theme [place]: many people with epilepsy can go on rides depending on their type of seizures  For some people, stress or excitement can trigger a seizure  Rollercoasters (especially if you are upside-down) are more dangerous for people with epilepsy  Medications  Take your medications on time  If you have trouble remembering, set alarms on your phone  You can visit www  [employer]veft[employer]  com to set up reminders through text message to help you remember to take your medications  Use a pillbox to help you keep track of your medications  When out of the house, take any needed medications with you  Consider keeping one or two extra doses with you in case you are unexpectedly away from home longer than planned  If you realize you missed a dose of your medications and it is less than 2 hours until your next dose, skip the missed dose  Do not double up your medication dose  If it is more than 2 hours until your next dose, you can take the missed dose  Avoid drinking alcohol, since this can enhance effects of your seizure medications  Be aware of common and major side effects of your medications  Keep your medications out of the reach of children  Parenting:  Childproof your home as much as possible  It is possible that you could drop your baby if you have a seizure while holding or feeding them  If you are nursing a baby, sit on the floor or bed with your back supported so the baby will not fall far if you should lose consciousness  Feed the baby while he or she is seated in an infant seat  Dress, change, and sponge bathe the baby on the floor  Move the baby around in a stroller or small crib  Keep a young baby in a playpen when you are alone, and a toddler in an indoor play yard, or childproof one room and use safety fall at the doors    When out of the house, use a bungee-type cord or restraint harness so your child cannot wander away if you have a seizure that affects your awareness

## 2022-12-06 NOTE — PROGRESS NOTES
Patient ID: Kyung King is a 67 y o  male  Assessment/Plan:    Seizure ([facility] )  Partial Seizures   67yo M w/ a PMH of hyperbilirubin, HLD, chronic alcohol use and focal seizures coming in for follow-up for his seizures that can to  At that time his had an EEG that was abnormal and had temporal wave discharges and was initially started on 250 mg daily of Depakote  This was increased to 500 mg twice daily at the initial neurology consultation visit  He initially had about 3-4 episodes daily lasting 10 to 15 seconds and then 50 minutes  Since being on the Depakote 500 mg twice daily, he has had only 1 episode per his wife where he was mainly mumbling and not doing much movements  This lasted for less than a minute  This episode occurred October 2022  He is very eager to get his license but understands that he cannot get his license until he is 6 months episode free  He understands that he needs to completely cut out his drinking as his medication is metabolized by the liver and can cause side effects/possible toxicity on the Depakote      W/U   10/2022 Depakote level: 78  Lab Results   Component Value Date    SODIUM 139 10/19/2022    K 4 1 10/19/2022     10/19/2022    CO2 30 10/19/2022    AGAP 2 (L) 10/19/2022    BUN 14 10/19/2022    CREATININE 1 08 10/19/2022    GLUF 98 10/19/2022    CALCIUM 9 3 10/19/2022    AST 12 10/19/2022    ALT 29 10/19/2022    ALKPHOS 47 10/19/2022    TP 7 2 10/19/2022    TBILI 1 44 (H) 10/19/2022    EGFR 68 10/19/2022       Impression:  focal seizures    Plan   - continue depakote 500mg BID   - see in 4 month f/u for re-evaluation evaluate if 6 months seizure free   -  will let PCP continue to monitor LFTs and platelets as these should be monitored 1-2x/year  - discussed stopping beer consumption and recommended to completely stop alcohol consumption          Problem List Items Addressed This Visit        Other    Seizure ([facility] ) - Primary     Partial Seizures   67yo M w/ a PMH of hyperbilirubin, HLD, chronic alcohol use and focal seizures coming in for follow-up for his seizures that can to  At that time his had an EEG that was abnormal and had temporal wave discharges and was initially started on 250 mg daily of Depakote  This was increased to 500 mg twice daily at the initial neurology consultation visit  He initially had about 3-4 episodes daily lasting 10 to 15 seconds and then 50 minutes  Since being on the Depakote 500 mg twice daily, he has had only 1 episode per his wife where he was mainly mumbling and not doing much movements  This lasted for less than a minute  This episode occurred October 2022  He is very eager to get his license but understands that he cannot get his license until he is 6 months episode free  He understands that he needs to completely cut out his drinking as his medication is metabolized by the liver and can cause side effects/possible toxicity on the Depakote      W/U   10/2022 Depakote level: 78  Lab Results   Component Value Date    SODIUM 139 10/19/2022    K 4 1 10/19/2022     10/19/2022    CO2 30 10/19/2022    AGAP 2 (L) 10/19/2022    BUN 14 10/19/2022    CREATININE 1 08 10/19/2022    GLUF 98 10/19/2022    CALCIUM 9 3 10/19/2022    AST 12 10/19/2022    ALT 29 10/19/2022    ALKPHOS 47 10/19/2022    TP 7 2 10/19/2022    TBILI 1 44 (H) 10/19/2022    EGFR 68 10/19/2022       Impression:  focal seizures    Plan   - continue depakote 500mg BID   - see in 4 month f/u for re-evaluation evaluate if 6 months seizure free   -  will let PCP continue to monitor LFTs and platelets as these should be monitored 1-2x/year  - discussed stopping beer consumption and recommended to completely stop alcohol consumption          Relevant Medications    divalproex sodium (Depakote) 500 mg EC tablet              Subjective:    HPI      67yo M w/ a PMH of hyperbilirubin, and HLD  For f/u for seizures on depakote 500mg BID and has been feeling great and seizure free and doesn't drive  Sent in [facility] 2022 and if seizure free on meds, can start license reinstating in 2023  He had an episode 2 months ago (10/2022 and was similar  since 2022)  It was mainly making abnormal sounds lasting rather than arm moving < 1 minute  He was not sick during this time  That seems to be his last episode  He drinks 1-2 beers/day and has cut down from 3-4 beers/day  Semiology: 10-15 seconds of R arm circular movement and mouth movements where patient is unaware of the episode       Special Features  Status epilepticus: no   Self Injury Seizures: no   Precipitating Factors: no     Epilepsy Risk Factors:  Abnormal pregnancy:    no  Abnormal birth/:   no  Abnormal Development:   no  Febrile seizures, simple:   no  Febrile seizures, complex:   no  CNS infection:    no  Intellectual disability:    no  Cerebral palsy:    no  Head injury (moderate/severe):  no  CNS neoplasm:    no  CNS malformation:    no  Neurosurgical procedure:   no  Stroke:     no  Alcohol abuse:    yes  Drug abuse:     no  Family history Sz/epilepsy:   no  Prior physical/sexual/emotional abuse: no    Prior AEDs:  - depakote 500mg BID     Prior Evaluation:  - CTH 2022: Normal   - EEG 2022: Occasional left anterior temporal sharp wave discharges are a potential source of seizures  - Depakote level 10/2022: 78  Lab Results   Component Value Date    SODIUM 139 10/19/2022    K 4 1 10/19/2022     10/19/2022    CO2 30 10/19/2022    AGAP 2 (L) 10/19/2022    BUN 14 10/19/2022    CREATININE 1 08 10/19/2022    GLUF 98 10/19/2022    CALCIUM 9 3 10/19/2022    AST 12 10/19/2022    ALT 29 10/19/2022    ALKPHOS 47 10/19/2022    TP 7 2 10/19/2022    TBILI 1 44 (H) 10/19/2022    EGFR 68 10/19/2022         Psychiatric History:  Depression: no  Anxiety: no  Psychosis: no   Psychiatric Admissions: no      The following portions of the patient's history were reviewed and updated as appropriate:   He  has no past medical history on file  He   Patient Active Problem List    Diagnosis Date Noted   • Seizure ([address] ) 08/09/2022   • Recurrent episodes of unresponsiveness    • Mixed hyperlipidemia 04/29/2022   • Hyperbilirubinemia 04/29/2022   • Cigar smoker 04/29/2022     He  has a past surgical history that includes Wrist surgery (Right) and Hernia repair  His family history includes Cancer in his brother and mother; Diabetes in his mother; Prostate cancer in his father  He  reports that he has been smoking cigars  He has never used smokeless tobacco  He reports current alcohol use  No history on file for drug use  Current Outpatient Medications   Medication Sig Dispense Refill   • atorvastatin (LIPITOR) 10 mg tablet Take 1 tablet (10 mg total) by mouth daily 90 tablet 3   • divalproex sodium (Depakote) 500 mg EC tablet Take 1 tablet (500 mg total) by mouth every 12 (twelve) hours 180 tablet 3     No current facility-administered medications for this visit  Current Outpatient Medications on File Prior to Visit   Medication Sig   • atorvastatin (LIPITOR) 10 mg tablet Take 1 tablet (10 mg total) by mouth daily   • [DISCONTINUED] divalproex sodium (Depakote) 500 mg EC tablet Take 1 tablet (500 mg total) by mouth every 12 (twelve) hours     No current facility-administered medications on file prior to visit  He has No Known Allergies            Objective:    Blood pressure 128/88, pulse 99, temperature 97 9 °F (36 6 °C), height 5' 7" (1 702 m), weight 66 2 kg (146 lb)  Physical Exam  Eyes:      General: Lids are normal       Extraocular Movements: Extraocular movements intact  Pupils: Pupils are equal, round, and reactive to light  Neurological:      Motor: Motor strength is normal       Deep Tendon Reflexes:      Reflex Scores:       Tricep reflexes are 2+ on the right side and 2+ on the left side  Bicep reflexes are 2+ on the right side and 2+ on the left side         Brachioradialis reflexes are 2+ on the right side and 2+ on the left side  Patellar reflexes are 1+ on the right side and 1+ on the left side  Achilles reflexes are 2+ on the right side and 2+ on the left side  GENERAL EXAM:    CONSTITUTIONAL: Well developed, well nourished, well groomed  No dysmorphic features  Eyes:  PERRLA, EOM normal      Neck:  Normal ROM, neck supple  HEENT:  Normocephalic atraumatic  No meningismus  Oropharynx is clear and moist  No oral mucosal lesions  Chest:  Respirations regular and unlabored  Cardiovascular:  Distal extremities warm without palpable edema or tenderness, no observed significant swelling  Musculoskeletal:  Full range of motion  Skin:  warm and dry   Psychiatric:  Normal behavior and appropriate affect          Neurological Exam  Mental Status  Awake, alert and oriented to person, place and time  Cranial Nerves  CN II: Visual acuity is normal  Visual fields full to confrontation  CN III, IV, VI: Extraocular movements intact bilaterally  Normal lids and orbits bilaterally  Pupils equal round and reactive to light bilaterally  CN V: Facial sensation is normal   CN VII: Full and symmetric facial movement  CN VIII: Hearing is normal   CN IX, X: Palate elevates symmetrically  Normal gag reflex  CN XI: Shoulder shrug strength is normal   CN XII: Tongue midline without atrophy or fasciculations  Motor   Strength is 5/5 throughout all four extremities  Sensory  Sensation is intact to light touch, pinprick, vibration and proprioception in all four extremities      Reflexes                                            Right                      Left  Brachioradialis                    2+                         2+  Biceps                                 2+                         2+  Triceps                                2+                         2+  Finger flex                           2+                         2+  Hamstring                            2+ 2+  Patellar                                1+                         1+  Achilles                                2+                         2+  Plantar                           Downgoing                Downgoing    Coordination  Right: Finger-to-nose normal  Rapid alternating movement normal  Heel-to-shin normal Left: Finger-to-nose normal  Rapid alternating movement normal  Heel-to-shin normal     Gait  Casual gait is normal including stance, stride, and arm swing  ROS:    Review of Systems   Constitutional: Negative for chills and fever  HENT: Negative for ear pain and sore throat  Eyes: Negative for pain and visual disturbance  Respiratory: Negative for cough and shortness of breath  Cardiovascular: Negative for chest pain and palpitations  Gastrointestinal: Negative for abdominal pain and vomiting  Genitourinary: Negative for dysuria and hematuria  Musculoskeletal: Negative for arthralgias and back pain  Skin: Negative for color change and rash  Neurological: Positive for seizures  Negative for syncope  All other systems reviewed and are negative  I have spent 45 minutes with Patient and family today in which greater than 50% of this time was spent in counseling/coordination of care regarding Diagnostic results, Prognosis, Risks and benefits of tx options, Intructions for management, Patient and family education, Importance of tx compliance, Risk factor reductions and Impressions  This includes clinical duties and reviewing this patient's charts, imaging, past admission/clinic notes, labwork/diagnostics, and more

## 2022-12-06 NOTE — ASSESSMENT & PLAN NOTE
Partial Seizures   65yo M w/ a PMH of hyperbilirubin, HLD, chronic alcohol use and focal seizures coming in for follow-up for his seizures that can to  At that time his had an EEG that was abnormal and had temporal wave discharges and was initially started on 250 mg daily of Depakote  This was increased to 500 mg twice daily at the initial neurology consultation visit  He initially had about 3-4 episodes daily lasting 10 to 15 seconds and then 50 minutes  Since being on the Depakote 500 mg twice daily, he has had only 1 episode per his wife where he was mainly mumbling and not doing much movements  This lasted for less than a minute  This episode occurred October 2022  He is very eager to get his license but understands that he cannot get his license until he is 6 months episode free  He understands that he needs to completely cut out his drinking as his medication is metabolized by the liver and can cause side effects/possible toxicity on the Depakote      W/U   10/2022 Depakote level: 78  Lab Results   Component Value Date    SODIUM 139 10/19/2022    K 4 1 10/19/2022     10/19/2022    CO2 30 10/19/2022    AGAP 2 (L) 10/19/2022    BUN 14 10/19/2022    CREATININE 1 08 10/19/2022    GLUF 98 10/19/2022    CALCIUM 9 3 10/19/2022    AST 12 10/19/2022    ALT 29 10/19/2022    ALKPHOS 47 10/19/2022    TP 7 2 10/19/2022    TBILI 1 44 (H) 10/19/2022    EGFR 68 10/19/2022       Impression:  focal seizures    Plan   - continue depakote 500mg BID   - see in 4 month f/u for re-evaluation evaluate if 6 months seizure free   -  will let PCP continue to monitor LFTs and platelets as these should be monitored 1-2x/year  - discussed stopping beer consumption and recommended to completely stop alcohol consumption

## 2023-01-04 ENCOUNTER — OFFICE VISIT (OUTPATIENT)
Dept: PODIATRY | Facility: CLINIC | Age: 73
End: 2023-01-04

## 2023-01-04 VITALS — WEIGHT: 146 LBS | HEIGHT: 67 IN | BODY MASS INDEX: 22.91 KG/M2

## 2023-01-04 DIAGNOSIS — B35.1 ONYCHOMYCOSIS: Primary | ICD-10-CM

## 2023-01-04 DIAGNOSIS — I73.9 PERIPHERAL VASCULAR DISEASE (HCC): ICD-10-CM

## 2023-01-04 DIAGNOSIS — L84 CALLUS: ICD-10-CM

## 2023-01-04 NOTE — PROGRESS NOTES
Suhail Leggett  1950  AT RISK FOOT CARE    1  Onychomycosis        2  Callus        3  Peripheral vascular disease [facility])            Patient presents for at-risk foot care  Patient has no acute concerns today  Patient has significant lower extremity risk due to diminished pulses in the feet and trophic skin changes to the lower extremity including thick toenail, atrophic skin, and decreased hair growth  On exam patient has thickened, hypertrophic, discolored, brittle toenails with subungual debris and tenderness x3   Callus: 2 b/l medial aspect 1st met head  Patient has diminished pedal pulses and decreased perfusion to the lower extremities  Patient has significant trophic changes to the skin including thick toenails, decreased pedal hair and atrophic skin  Today's treatment includes:  Debridement of toenails  Using nail nipper, hugo, and curette, nails were sharply debrided, reduced in thickness and length  Devitalized nail tissue and fungal debris excised and removed  Patient tolerated well  Discussed proper shoe gear, daily inspections of feet, and general foot health with patient  Patient has Q8  findings and is recommended for at risk foot care every 9-10 weeks      Patients most recent complete clinical foot exam was on: 8/5

## 2023-03-13 ENCOUNTER — APPOINTMENT (OUTPATIENT)
Dept: LAB | Facility: CLINIC | Age: 73
End: 2023-03-13

## 2023-03-13 ENCOUNTER — OFFICE VISIT (OUTPATIENT)
Dept: INTERNAL MEDICINE CLINIC | Facility: CLINIC | Age: 73
End: 2023-03-13

## 2023-03-13 VITALS
HEIGHT: 67 IN | SYSTOLIC BLOOD PRESSURE: 128 MMHG | OXYGEN SATURATION: 97 % | HEART RATE: 75 BPM | DIASTOLIC BLOOD PRESSURE: 72 MMHG | TEMPERATURE: 97.4 F | WEIGHT: 156 LBS | BODY MASS INDEX: 24.48 KG/M2

## 2023-03-13 DIAGNOSIS — E83.59 OTHER DISORDERS OF CALCIUM METABOLISM: ICD-10-CM

## 2023-03-13 DIAGNOSIS — M79.601 PARESTHESIA AND PAIN OF BOTH UPPER EXTREMITIES: ICD-10-CM

## 2023-03-13 DIAGNOSIS — R79.9 ABNORMAL FINDING OF BLOOD CHEMISTRY, UNSPECIFIED: Primary | ICD-10-CM

## 2023-03-13 DIAGNOSIS — R79.9 ABNORMAL FINDING OF BLOOD CHEMISTRY, UNSPECIFIED: ICD-10-CM

## 2023-03-13 DIAGNOSIS — R20.2 PARESTHESIA AND PAIN OF BOTH UPPER EXTREMITIES: Primary | ICD-10-CM

## 2023-03-13 DIAGNOSIS — R20.2 PARESTHESIA AND PAIN OF BOTH UPPER EXTREMITIES: ICD-10-CM

## 2023-03-13 DIAGNOSIS — M79.601 PARESTHESIA AND PAIN OF BOTH UPPER EXTREMITIES: Primary | ICD-10-CM

## 2023-03-13 DIAGNOSIS — E55.9 VITAMIN D DEFICIENCY: Primary | ICD-10-CM

## 2023-03-13 DIAGNOSIS — M79.602 PARESTHESIA AND PAIN OF BOTH UPPER EXTREMITIES: ICD-10-CM

## 2023-03-13 DIAGNOSIS — G56.03 BILATERAL CARPAL TUNNEL SYNDROME: ICD-10-CM

## 2023-03-13 DIAGNOSIS — R94.6 ABNORMAL RESULTS OF THYROID FUNCTION STUDIES: ICD-10-CM

## 2023-03-13 DIAGNOSIS — R56.9 SEIZURE (HCC): ICD-10-CM

## 2023-03-13 DIAGNOSIS — M79.602 PARESTHESIA AND PAIN OF BOTH UPPER EXTREMITIES: Primary | ICD-10-CM

## 2023-03-13 LAB
25(OH)D3 SERPL-MCNC: 9.4 NG/ML (ref 30–100)
ALBUMIN SERPL BCP-MCNC: 3.8 G/DL (ref 3.5–5)
ALP SERPL-CCNC: 52 U/L (ref 46–116)
ALT SERPL W P-5'-P-CCNC: 27 U/L (ref 12–78)
ANA SER QL IA: NEGATIVE
ANION GAP SERPL CALCULATED.3IONS-SCNC: 0 MMOL/L (ref 4–13)
AST SERPL W P-5'-P-CCNC: 21 U/L (ref 5–45)
BASOPHILS # BLD AUTO: 0.02 THOUSANDS/ÂΜL (ref 0–0.1)
BASOPHILS NFR BLD AUTO: 0 % (ref 0–1)
BILIRUB SERPL-MCNC: 0.64 MG/DL (ref 0.2–1)
BUN SERPL-MCNC: 17 MG/DL (ref 5–25)
CALCIUM SERPL-MCNC: 9.5 MG/DL (ref 8.3–10.1)
CHLORIDE SERPL-SCNC: 109 MMOL/L (ref 96–108)
CO2 SERPL-SCNC: 28 MMOL/L (ref 21–32)
CREAT SERPL-MCNC: 0.88 MG/DL (ref 0.6–1.3)
CRP SERPL QL: <3 MG/L
EOSINOPHIL # BLD AUTO: 0.09 THOUSAND/ÂΜL (ref 0–0.61)
EOSINOPHIL NFR BLD AUTO: 2 % (ref 0–6)
ERYTHROCYTE [DISTWIDTH] IN BLOOD BY AUTOMATED COUNT: 12.9 % (ref 11.6–15.1)
EST. AVERAGE GLUCOSE BLD GHB EST-MCNC: 117 MG/DL
FOLATE SERPL-MCNC: 16.3 NG/ML (ref 3.1–17.5)
GFR SERPL CREATININE-BSD FRML MDRD: 85 ML/MIN/1.73SQ M
GLUCOSE P FAST SERPL-MCNC: 99 MG/DL (ref 65–99)
HBA1C MFR BLD: 5.7 %
HCT VFR BLD AUTO: 42.7 % (ref 36.5–49.3)
HGB BLD-MCNC: 13.6 G/DL (ref 12–17)
IMM GRANULOCYTES # BLD AUTO: 0.02 THOUSAND/UL (ref 0–0.2)
IMM GRANULOCYTES NFR BLD AUTO: 0 % (ref 0–2)
LYMPHOCYTES # BLD AUTO: 2.52 THOUSANDS/ÂΜL (ref 0.6–4.47)
LYMPHOCYTES NFR BLD AUTO: 43 % (ref 14–44)
MAGNESIUM SERPL-MCNC: 2 MG/DL (ref 1.6–2.6)
MCH RBC QN AUTO: 29.4 PG (ref 26.8–34.3)
MCHC RBC AUTO-ENTMCNC: 31.9 G/DL (ref 31.4–37.4)
MCV RBC AUTO: 92 FL (ref 82–98)
MONOCYTES # BLD AUTO: 0.68 THOUSAND/ÂΜL (ref 0.17–1.22)
MONOCYTES NFR BLD AUTO: 12 % (ref 4–12)
NEUTROPHILS # BLD AUTO: 2.53 THOUSANDS/ÂΜL (ref 1.85–7.62)
NEUTS SEG NFR BLD AUTO: 43 % (ref 43–75)
NRBC BLD AUTO-RTO: 0 /100 WBCS
PLATELET # BLD AUTO: 145 THOUSANDS/UL (ref 149–390)
PMV BLD AUTO: 11.2 FL (ref 8.9–12.7)
POTASSIUM SERPL-SCNC: 3.9 MMOL/L (ref 3.5–5.3)
PROT SERPL-MCNC: 6.7 G/DL (ref 6.4–8.4)
RBC # BLD AUTO: 4.63 MILLION/UL (ref 3.88–5.62)
RHEUMATOID FACT SER QL LA: NEGATIVE
SODIUM SERPL-SCNC: 137 MMOL/L (ref 135–147)
T4 FREE SERPL-MCNC: 0.76 NG/DL (ref 0.76–1.46)
TSH SERPL DL<=0.05 MIU/L-ACNC: 4.52 UIU/ML (ref 0.45–4.5)
VALPROATE SERPL-MCNC: 68 UG/ML (ref 50–100)
VIT B12 SERPL-MCNC: 441 PG/ML (ref 100–900)
WBC # BLD AUTO: 5.86 THOUSAND/UL (ref 4.31–10.16)

## 2023-03-13 RX ORDER — MELOXICAM 7.5 MG/1
7.5 TABLET ORAL DAILY
Qty: 30 TABLET | Refills: 0 | Status: SHIPPED | OUTPATIENT
Start: 2023-03-13

## 2023-03-13 RX ORDER — ERGOCALCIFEROL 1.25 MG/1
50000 CAPSULE ORAL WEEKLY
Qty: 12 CAPSULE | Refills: 3 | Status: SHIPPED | OUTPATIENT
Start: 2023-03-13

## 2023-03-13 NOTE — PROGRESS NOTES
Assessment/Plan:  Problem List Items Addressed This Visit        Other    Seizure ([facility] [address] )   Other Visit Diagnoses     Paresthesia and pain of both upper extremities    -  Primary    Relevant Medications    meloxicam (MOBIC) 7 5 mg tablet    Other Relevant Orders    KALYN w/Reflex    CBC and differential    Comprehensive metabolic panel    C-reactive protein    Hemoglobin A1C    TSH, 3rd generation with Free T4 reflex    RF Screen w/ Reflex to Titer    Vitamin B12    Folate    Magnesium    Vitamin D 25 hydroxy    Valproic acid level, total    Ambulatory referral to Physical Therapy    EMG 2 Limb Upper Extremity    Ambulatory Referral to Occupational Therapy    Abnormal finding of blood chemistry, unspecified        Relevant Orders    Hemoglobin A1C    Other disorders of calcium metabolism        Relevant Orders    Vitamin D 25 hydroxy    Bilateral carpal tunnel syndrome        Relevant Orders    Ambulatory Referral to Occupational Therapy           Diagnoses and all orders for this visit:    Paresthesia and pain of both upper extremities  -     KALYN w/Reflex; Future  -     CBC and differential; Future  -     Comprehensive metabolic panel; Future  -     C-reactive protein; Future  -     Hemoglobin A1C; Future  -     TSH, 3rd generation with Free T4 reflex; Future  -     RF Screen w/ Reflex to Titer; Future  -     Vitamin B12; Future  -     Folate; Future  -     Magnesium; Future  -     Vitamin D 25 hydroxy; Future  -     Valproic acid level, total; Future  -     meloxicam (MOBIC) 7 5 mg tablet; Take 1 tablet (7 5 mg total) by mouth daily  -     Ambulatory referral to Physical Therapy; Future  -     EMG 2 Limb Upper Extremity; Future  -     Ambulatory Referral to Occupational Therapy; Future    Seizure (HCC)    Abnormal finding of blood chemistry, unspecified  -     Hemoglobin A1C; Future    Other disorders of calcium metabolism  -     Vitamin D 25 hydroxy;  Future    Bilateral carpal tunnel syndrome  -     Ambulatory Referral to Occupational Therapy; Future      No problem-specific Assessment & Plan notes found for this encounter  A/P: given hx and PE, suspect CTS  Need to r/o other possibilities  Will check labs  No neck pain and will hold on imaging  Start NSAID's and refer to OT  Order emg  May need to see hand specialist, but not that advanced  ?cock up splint may help  RTC two weeks  Subjective:      Patient ID: Omayra Newsome is a 67 y o  male  Right handed WM presents with a several month h/o progressive bilat UE numbness and tingling, right greater than the left,  w/o weakness  Mainly at night  No neck pain  No prior issues  No trauma  NO recent vaccines or travel  No recent illnesses  No new meds or dietary changes  Worked in construction    No stroke like events  The following portions of the patient's history were reviewed and updated as appropriate:   He has no past medical history on file  ,  does not have any pertinent problems on file  ,   has a past surgical history that includes Wrist surgery (Right) and Hernia repair  ,  family history includes Cancer in his brother and mother; Diabetes in his mother; Prostate cancer in his father  ,   reports that he has been smoking cigars  He has never used smokeless tobacco  He reports current alcohol use  No history on file for drug use ,  has No Known Allergies     Current Outpatient Medications   Medication Sig Dispense Refill   • atorvastatin (LIPITOR) 10 mg tablet Take 1 tablet (10 mg total) by mouth daily 90 tablet 3   • divalproex sodium (Depakote) 500 mg EC tablet Take 1 tablet (500 mg total) by mouth every 12 (twelve) hours 180 tablet 3   • meloxicam (MOBIC) 7 5 mg tablet Take 1 tablet (7 5 mg total) by mouth daily 30 tablet 0     No current facility-administered medications for this visit  Review of Systems   Constitutional: Negative for activity change, chills, diaphoresis, fatigue and fever     Respiratory: Negative for cough, chest tightness, shortness of breath and wheezing  Cardiovascular: Negative for chest pain, palpitations and leg swelling  Gastrointestinal: Negative for abdominal pain, constipation, diarrhea, nausea and vomiting  Genitourinary: Negative for difficulty urinating, dysuria and frequency  Musculoskeletal: Negative for arthralgias, gait problem, myalgias, neck pain and neck stiffness  Neurological: Positive for numbness  Negative for dizziness, syncope, weakness, light-headedness and headaches  Psychiatric/Behavioral: Negative for confusion  The patient is not nervous/anxious  PHQ-2/9 Depression Screening          Objective:  Vitals:    03/13/23 0850   BP: 128/72   BP Location: Left arm   Patient Position: Sitting   Cuff Size: Standard   Pulse: 75   Temp: (!) 97 4 °F (36 3 °C)   SpO2: 97%   Weight: 70 8 kg (156 lb)   Height: 5' 7" (1 702 m)     Body mass index is 24 43 kg/m²  Physical Exam  Vitals and nursing note reviewed  Constitutional:       General: He is not in acute distress  Appearance: Normal appearance  He is not ill-appearing  HENT:      Head: Normocephalic and atraumatic  Mouth/Throat:      Mouth: Mucous membranes are moist    Eyes:      Extraocular Movements: Extraocular movements intact  Conjunctiva/sclera: Conjunctivae normal       Pupils: Pupils are equal, round, and reactive to light  Neck:      Vascular: No carotid bruit  Cardiovascular:      Rate and Rhythm: Normal rate and regular rhythm  Heart sounds: Normal heart sounds  No murmur heard  Pulmonary:      Effort: Pulmonary effort is normal  No respiratory distress  Breath sounds: Normal breath sounds  No wheezing, rhonchi or rales  Abdominal:      General: Bowel sounds are normal  There is no distension  Palpations: Abdomen is soft  Tenderness: There is no abdominal tenderness  Musculoskeletal:      Cervical back: Neck supple  Right lower leg: No edema  Left lower leg: No edema  Comments: C Spine w/o gross deformities, increase temp, erythema, swelling, or lesions  Tenderness none  ROM wnl  Spasms none  UE strength 5/5 with tone/ROM WNL  DTR 2/4  Grasp wnl  TInel's positive on the right , finkelstein negative , and phalen's negative    No atrophy  Neurological:      General: No focal deficit present  Mental Status: He is alert and oriented to person, place, and time  Mental status is at baseline  Cranial Nerves: No cranial nerve deficit  Motor: No weakness  Coordination: Coordination normal       Gait: Gait normal       Deep Tendon Reflexes: Reflexes normal    Psychiatric:         Mood and Affect: Mood normal          Behavior: Behavior normal          Thought Content:  Thought content normal          Judgment: Judgment normal

## 2023-03-13 NOTE — PATIENT INSTRUCTIONS
Carpal Tunnel Syndrome Exercises   WHAT YOU NEED TO KNOW:   What do I need to know about carpal tunnel syndrome (CTS) exercises? CTS exercises can help relieve pain and increase your range of motion  Your healthcare provider or physical therapist can tell you how often to do the exercises  How do I perform CTS exercises? Finger extensions:  Hold your fingers and thumb close together  Keep them straight  Put a rubber band around the outside of your fingers and thumb  Spread your fingers apart  Then slowly bring them together without letting the rubber band fall off  Repeat 40 times  Wrist flexor stretch:  Hold your arm out straight  Grasp your fingers with your other hand  Slowly bend the fingers back (palm facing away) until you feel a stretch in your wrist  Hold for 10 seconds  Repeat 5 times  Wrist extensor stretch:  Hold your arm out straight  Grasp your fingers with your other hand  Slowly bend the fingers and hand down (palm facing you) until you feel a stretch on top of your hand  Hold for 10 seconds  Repeat 5 times  Wrist curls without weights:  Sit in a chair with your forearm resting on your thigh or a table  With your palm facing down, flex your wrist up 3 inches and slowly lower it  Turn your forearm over and repeat with your palm facing up  Do each exercise 20 times  Shrugs:  Stand with your arms by your side  Lift your shoulders up to your ears and hold for 1 second  Then pull your shoulders back, pinching your shoulder blades together  Hold for 1 second  Then relax your shoulders  Repeat 20 times  CARE AGREEMENT:   You have the right to help plan your care  Learn about your health condition and how it may be treated  Discuss treatment options with your healthcare providers to decide what care you want to receive  You always have the right to refuse treatment  The above information is an  only   It is not intended as medical advice for individual conditions or treatments  Talk to your doctor, nurse or pharmacist before following any medical regimen to see if it is safe and effective for you  © Copyright [facility] 2022 Information is for End User's use only and may not be sold, redistributed or otherwise used for commercial purposes  Carpal Tunnel Syndrome   WHAT YOU NEED TO KNOW:   What is carpal tunnel syndrome (CTS)? CTS is a condition that causes pressure to build in the carpal tunnel  The carpal tunnel is a small area between bones and tissues in your wrist  Swelling in this area puts pressure on the median nerve  The median nerve controls muscles and feeling in the hand  What increases my risk for CTS? CTS is more common in women than in men  Any of the following may also increase the risk for CTS:  Older age    A family history of CTS    Activities that use forceful or repetitive movement of your wrist and hand    A past or current wrist injury    Pregnancy or obesity that puts pressure on the area from swelling    A health condition, such as diabetes, arthritis, or hypothyroidism    What are the signs and symptoms of CTS? Dull, sharp, or shooting pain in your hand    Numb, tingling, or burning feeling in your thumb and first, middle, and ring fingers    Arm pain or tingling that may move up your arm toward your shoulder    Weakness in your hand    Swelling in your hand    Not being able to control how your hand moves, or not being able to use your fingers easily    How is CTS diagnosed? Your healthcare provider will examine your hand and arm  He or she will ask how long you have had symptoms and what makes them worse  Tell your provider if you have a family history of CTS  You may also need any of the following:  Tests  may be done to check for pressure on your nerve or to test how well your nerves are working  Your healthcare provider will tap, squeeze, press on, and gently move your wrist in different ways      X-ray, ultrasound, or MRI pictures may be used to look at the bones in your wrist and hand  You may be given contrast liquid to help your wrist show up better in the pictures  Tell the healthcare provider if you have ever had an allergic reaction to contrast liquid  Do not enter the MRI room with anything metal  Metal can cause serious injury  Tell healthcare providers if you have any metal in or on your body  How is CTS treated? Your symptoms may get better without treatment  You may need any of the following if your symptoms continue or are severe:  NSAIDs  help decrease swelling and pain or fever  This medicine is available with or without a doctor's order  NSAIDs can cause stomach bleeding or kidney problems in certain people  If you take blood thinner medicine, always ask your healthcare provider if NSAIDs are safe for you  Always read the medicine label and follow directions  Steroid injections  may help decrease pain and swelling  Steroids are injected into the carpal tunnel  Transcutaneous electric nerve stimulation (TENS)  uses mild electrical impulses to help decrease your wrist pain  Surgery  called decompression may be used to take pressure off of the median nerve in your wrist     How can I manage my symptoms? Apply ice to your wrist   Ice helps decrease swelling and pain in your wrist  Ice may also help prevent tissue damage  Use an ice pack, or put crushed ice in a plastic bag  Cover the ice pack with a towel  Place it on your wrist for 15 to 20 minutes every hour, or as directed  Rest your hands  Let your hands rest for a short time between repetitive motions, such as typing  If you feel pain, stop what you are doing and gently massage your wrist and hand  Go to physical and occupational therapy, if directed  Physical therapists will show you ways to exercise and strengthen your wrist  Occupational therapists will show you safe ways to use your wrist while you do your usual activities      Use a wrist splint as directed  A splint will keep your wrist straight or in a slightly bent position  A wrist splint decreases pressure on the median nerve by letting your wrist rest  You may need to wear the splint for up to 8 weeks  You may need to wear it at night  When should I seek immediate care? You suddenly lose feeling in your hand or fingers and you cannot move them  Your hand suddenly changes color  When should I call my doctor? Your symptoms get worse  Your hand and fingers are so weak that you cannot grab, squeeze, or lift items  You have questions or concerns about your condition or care  CARE AGREEMENT:   You have the right to help plan your care  Learn about your health condition and how it may be treated  Discuss treatment options with your healthcare providers to decide what care you want to receive  You always have the right to refuse treatment  The above information is an  only  It is not intended as medical advice for individual conditions or treatments  Talk to your doctor, nurse or pharmacist before following any medical regimen to see if it is safe and effective for you  © Copyright Tadeo Brown 2022 Information is for End User's use only and may not be sold, redistributed or otherwise used for commercial purposes

## 2023-03-23 ENCOUNTER — EVALUATION (OUTPATIENT)
Dept: PHYSICAL THERAPY | Facility: CLINIC | Age: 73
End: 2023-03-23

## 2023-03-23 VITALS — HEART RATE: 65 BPM | DIASTOLIC BLOOD PRESSURE: 83 MMHG | SYSTOLIC BLOOD PRESSURE: 147 MMHG

## 2023-03-23 DIAGNOSIS — M79.602 PARESTHESIA AND PAIN OF BOTH UPPER EXTREMITIES: ICD-10-CM

## 2023-03-23 DIAGNOSIS — R20.2 PARESTHESIA AND PAIN OF BOTH UPPER EXTREMITIES: ICD-10-CM

## 2023-03-23 DIAGNOSIS — M79.601 PARESTHESIA AND PAIN OF BOTH UPPER EXTREMITIES: ICD-10-CM

## 2023-03-23 NOTE — PROGRESS NOTES
PT Evaluation     Today's date: 3/23/2023  Patient name: Juana Bar  : 1950  MRN: [id number]  Referring provider: Maya Galan DO  Dx:   Encounter Diagnosis     ICD-10-CM    1  Paresthesia and pain of both upper extremities  R20 2 Ambulatory referral to Physical Therapy    M79 601     M79 602                      Assessment  Assessment details: Juana Bar is a 67 y o  male who presents with complaints of pain and paresthesia and pain of both upper extremities  No further referral appears necessary at this time based upon examination results  Patient presents with clinical s/s of carpal tunnel syndrome b/l  Cervical exam was clear and did not elicit symptoms of b/l UE pain  Prognosis is good given HEP compliance and PT 2x/wk  If patient is not making any improvement over the next 2 weeks we will refer to OT for hand therapy  Recommended patient trial carpal tunnel night splint (info provided) to help with relief from sx at night  Please contact me if you have any questions or recommendations  Thank you for the opportunity to share in  [facility]'s care  Impairments: abnormal or restricted ROM, activity intolerance, impaired physical strength, lacks appropriate home exercise program and pain with function  Functional limitations: pain and parast>LUnderstanding of Dx/Px/POC: good   Prognosis: good    Goals  STGs  1) In 4 weeks patient will demonstrate improved wrist flexion and ext ROM by 10 deg on each UE  2) In 4 weeks patient will report 3 points reduced pain in R UE  3) IN 4 weeks patient will report 25% reduction or better of parasthesias at night    LTGs  1) In 4-8 weeks patient will report minimal to no symptoms at night  2) In 4-8 weeks patient will demonstrate pain free gripping with R UE with an average of 10# improvement in  strength  3) In 4-8 weeks patient will demonstrate independence with HEP      Plan  Patient would benefit from: skilled physical therapy  Referral necessary: No  Planned modality interventions: thermotherapy: hydrocollator packs  Planned therapy interventions: joint mobilization, manual therapy, massage, Payan taping, neuromuscular re-education, patient education, postural training, self care, strengthening, stretching, therapeutic activities, therapeutic exercise, flexibility, functional ROM exercises and home exercise program  Frequency: 2x week  Duration in weeks: 8  Plan of Care beginning date: 3/23/2023  Plan of Care expiration date: 5/18/2023  Treatment plan discussed with: patient        Subjective Evaluation    History of Present Illness  Mechanism of injury: -c/o b/l hand pain and numbness, R>>L  -symptoms are worse at night  -symptoms have been present for 1-2 weeks  -started Meloxicam about 1 week ago and this is helping  -symptoms in L hand are almost completely resolved  -started doing some of his own stretches to his hands and fingers which seems to be helping  -h/o carpentry work, retired 5 yrs ago  -h/o R wrist surgery many years ago during childhood  -has only seen his PCP for this condition, recommending PT/OT  -no imaging or NCV studies have been performed at this point  -denies neck pain, denies other UE pain  Pain  Pain scale: R = 3/10, L = 1/10  Pain scale at lowest: R = 3/10, L = 1/10  Pain scale at highest: R = 9/10, L = 3/10    Quality: burning and throbbing    Social Support    Employment status: not working  Hand dominance: right      Diagnostic Tests  No diagnostic tests performed  Treatments  Previous treatment: medication  Current treatment: medication  Patient Goals  Patient goals for therapy: decreased pain  Patient goal: get rid of symptoms at night        Objective     Active Range of Motion   Cervical/Thoracic Spine       Cervical    Flexion:  WFL  Extension:  WFL  Left lateral flexion:  Restriction level: moderate  Right lateral flexion:  Restriction level moderate  Left rotation:  WFL  Right rotation:  [facility]    Left Wrist   Wrist flexion: 72 degrees   Wrist extension: 48 degrees   Radial deviation: 30 degrees   Ulnar deviation: 20 degrees     Right Wrist   Wrist flexion: 50 degrees   Wrist extension: 50 degrees   Radial deviation: 20 degrees   Ulnar deviation: 40 degrees     Passive Range of Motion     Left Wrist   Wrist flexion: 80 degrees   Wrist extension: 55 degrees   Radial deviation: 32 degrees   Ulnar deviation: 30 degrees     Right Wrist   Wrist flexion: 65 degrees   Wrist extension: 60 degrees   Radial deviation: 30 degrees   Ulnar deviation: 50 degrees     Strength/Myotome Testing     Left Wrist/Hand      (2nd hand position)     Trial 1: 50    Trial 2: 50    Trial 3: 50    Thumb Strength  Key/Lateral Pinch     Trial 1: 19    Trial 2: 19    Trial 3: 17    Average: 18 33  Tip/Two-Point Pinch     Trial 1: 10    Trial 2: 11    Trial 3: 12    Average: 11  Palmar/Three-Point Pinch     Trial 1: 16    Trial 2: 17    Trial 3: 16    Average: 16 33     Right Wrist/Hand      (2nd hand position)     Trial 1: 40    Trial 2: 50    Trial 3: 50    Comments: With pain/"stinging" in hand    Thumb Strength   Key/Lateral Pinch     Trial 1: 22    Trial 2: 20    Trial 3: 21    Average: 21  Tip/Two-Point Pinch     Trial 1: 12    Trial 2: 12    Trial 3: 12    Average: 12  Palmar/Three-Point Pinch     Trial 1: 12    Trial 2: 15    Trial 3: 17    Average: 14 67    Additional Strength Details  R hand pain reproduced when gripping with elbow by side  Slight pain reproduced when gripping with elbow extended  Pain free on L with both positions of gripping  Pain free key pinch, two point pinch and three point pinch b/l      Tests   Cervical   Negative vertical compression and cervical distraction  Left   Negative Spurling's Test A  Right   Negative Spurling's Test A  Left Elbow   Negative Tinel's sign (cubital tunnel)  Right Elbow   Negative Tinel's sign (cubital tunnel)       Left Wrist/Hand   Negative Phalen's sign, Tinel's sign (medial nerve) and Tinel's sign (radial tunnel)  Right Wrist/Hand   Positive Phalen's sign  Negative Tinel's sign (medial nerve) and Tinel's sign (radial tunnel)  Lumbar   Negative vertical compression       Swelling     Left Wrist/Hand   Circumference MCP: 21 cm  Circumference wrist: 18 cm    Right Wrist/Hand   Circumference MCP: 21 5 cm  Circumference wrist: 18 5 cm         Precautions: none  POC exp 5/18/21  Manuals 3/23       IASTM to forearm flexors        Wrist flexion and extension stretching following STM                                Ther Ex        Median nerve glides Instructed for HEP - see printout in chart, review prn         UBE for strength and endurance        Wrist flexor forearm stretching        Wrist extensor forearm stretching        Wrist extension off edge of table with DB        Wrist flex/ext with Theraband bar        Thumb to finger opposition - each finger to thumb        PIP flexion AROM        MCP flexion AROM        Finger extension stretching (wrist neutral)                Therapeutic Activity        Theraputty gripping        Theraputty pinching  -2 tip  -3 tip  -lateral/key pinch                Modalities        MHP prn

## 2023-03-24 ENCOUNTER — RA CDI HCC (OUTPATIENT)
Dept: OTHER | Facility: HOSPITAL | Age: 73
End: 2023-03-24

## 2023-03-28 ENCOUNTER — OFFICE VISIT (OUTPATIENT)
Dept: PHYSICAL THERAPY | Facility: CLINIC | Age: 73
End: 2023-03-28

## 2023-03-28 DIAGNOSIS — R20.2 PARESTHESIA AND PAIN OF BOTH UPPER EXTREMITIES: Primary | ICD-10-CM

## 2023-03-28 DIAGNOSIS — M79.601 PARESTHESIA AND PAIN OF BOTH UPPER EXTREMITIES: Primary | ICD-10-CM

## 2023-03-28 DIAGNOSIS — M79.602 PARESTHESIA AND PAIN OF BOTH UPPER EXTREMITIES: Primary | ICD-10-CM

## 2023-03-28 NOTE — PROGRESS NOTES
"Daily Note     Today's date: 3/28/2023  Patient name: Albert Glynn  : 1950  MRN: [id number]  Referring provider: Lola Rider DO  Dx:   Encounter Diagnosis     ICD-10-CM    1  Paresthesia and pain of both upper extremities  R20 2     M79 601     M79 602                      Subjective: Patient reports he purchased the night splint as recommended and has been using this on his R side  This has helped him at night, he has not been having tingling in the hand when he sleeps when using this  Objective: See treatment diary below      Assessment: Tolerated treatment well  Required frequent demonstration and cueing for proper technique with stretching  Patient would benefit from continued PT      Plan: Continue per plan of care  Progress treatment as tolerated         Precautions: none  POC exp 21  Manuals 3/23 3/28      IASTM to forearm flexors  KG      Wrist flexion and extension stretching following STM  KG                              Ther Ex        Median nerve glides Instructed for HEP - see printout in chart, review prn         UBE for strength and endurance  120 rpm  5 mins fwd      Wrist flexor forearm stretching  10\"x10 ea      Wrist extensor forearm stretching        Wrist extension off edge of table with DB        Wrist flex/ext with Theraband bar        Thumb to finger opposition - each finger to thumb  x10 ea      DIP and PIP flexion AROM  x10 ea      MCP flexion AROM        Finger extension stretching (wrist neutral)  10\"x10 therapist assist              Therapeutic Activity        Theraputty gripping        Theraputty pinching  -2 tip  -3 tip  -lateral/key pinch                Modalities        MHP prn                     "

## 2023-03-29 ENCOUNTER — OFFICE VISIT (OUTPATIENT)
Dept: INTERNAL MEDICINE CLINIC | Facility: CLINIC | Age: 73
End: 2023-03-29

## 2023-03-29 VITALS
BODY MASS INDEX: 24.48 KG/M2 | HEART RATE: 86 BPM | TEMPERATURE: 97.5 F | OXYGEN SATURATION: 90 % | SYSTOLIC BLOOD PRESSURE: 130 MMHG | DIASTOLIC BLOOD PRESSURE: 72 MMHG | HEIGHT: 67 IN | WEIGHT: 156 LBS

## 2023-03-29 DIAGNOSIS — M79.601 PARESTHESIA AND PAIN OF BOTH UPPER EXTREMITIES: Primary | ICD-10-CM

## 2023-03-29 DIAGNOSIS — R79.89 ABNORMAL THYROID BLOOD TEST: ICD-10-CM

## 2023-03-29 DIAGNOSIS — R73.03 PREDIABETES: ICD-10-CM

## 2023-03-29 DIAGNOSIS — D75.839 THROMBOCYTOSIS: ICD-10-CM

## 2023-03-29 DIAGNOSIS — R20.2 PARESTHESIA AND PAIN OF BOTH UPPER EXTREMITIES: Primary | ICD-10-CM

## 2023-03-29 DIAGNOSIS — G56.03 BILATERAL CARPAL TUNNEL SYNDROME: ICD-10-CM

## 2023-03-29 DIAGNOSIS — D69.6 PLATELETS DECREASED (HCC): ICD-10-CM

## 2023-03-29 DIAGNOSIS — M79.602 PARESTHESIA AND PAIN OF BOTH UPPER EXTREMITIES: Primary | ICD-10-CM

## 2023-03-29 DIAGNOSIS — E55.9 VITAMIN D DEFICIENCY: ICD-10-CM

## 2023-03-29 RX ORDER — ERGOCALCIFEROL 1.25 MG/1
50000 CAPSULE ORAL WEEKLY
Qty: 12 CAPSULE | Refills: 3 | Status: SHIPPED | OUTPATIENT
Start: 2023-03-29

## 2023-03-29 NOTE — PROGRESS NOTES
Assessment/Plan:  Problem List Items Addressed This Visit        Hematopoietic and Hemostatic    Thrombocytosis       Other    Vitamin D deficiency    Relevant Medications    ergocalciferol (VITAMIN D2) 50,000 units    Prediabetes    Platelets decreased (HCC)    Abnormal thyroid blood test   Other Visit Diagnoses     Paresthesia and pain of both upper extremities    -  Primary    Bilateral carpal tunnel syndrome               Diagnoses and all orders for this visit:    Paresthesia and pain of both upper extremities    Bilateral carpal tunnel syndrome    Vitamin D deficiency  -     ergocalciferol (VITAMIN D2) 50,000 units; Take 1 capsule (50,000 Units total) by mouth once a week    Prediabetes    Abnormal thyroid blood test    Thrombocytosis    Platelets decreased (HCC)      No problem-specific Assessment & Plan notes found for this encounter  A/P: Doing better and appreciate PT/OT input  Will continue with therapy, PRN NSAID's, and cock up splints  Discussed labs and pt to start the vit d  Will monitor the plts for now  Will repeat the TSH in several weeks  Modify risks for diabetes  RTC two months for routine  Subjective:      Patient ID: Marcin Tinsley is a 67 y o  male  WM RTC for f/u suspected CTS and sent to PT/OT for braces and treatment  Labs back and found to have prediabetic range HgA1c, very low vit d, low platelets, and borderline TSH  Doing a lot better  S/s resolved  No new issues  Has yet to start vit d replacement  The following portions of the patient's history were reviewed and updated as appropriate:   He has a past medical history of High cholesterol  ,  does not have any pertinent problems on file  ,   has a past surgical history that includes Wrist surgery (Right) and Hernia repair  ,  family history includes Cancer in his brother and mother; Diabetes in his mother; Prostate cancer in his father  ,   reports that he has been smoking cigars   He has never used smokeless tobacco  He "reports current alcohol use  No history on file for drug use ,  has No Known Allergies     Current Outpatient Medications   Medication Sig Dispense Refill   • atorvastatin (LIPITOR) 10 mg tablet Take 1 tablet (10 mg total) by mouth daily 90 tablet 3   • divalproex sodium (Depakote) 500 mg EC tablet Take 1 tablet (500 mg total) by mouth every 12 (twelve) hours 180 tablet 3   • ergocalciferol (VITAMIN D2) 50,000 units Take 1 capsule (50,000 Units total) by mouth once a week 12 capsule 3   • meloxicam (MOBIC) 7 5 mg tablet Take 1 tablet (7 5 mg total) by mouth daily 30 tablet 0     No current facility-administered medications for this visit  Review of Systems   Constitutional: Negative for activity change, chills, diaphoresis, fatigue and fever  Respiratory: Negative for cough, chest tightness, shortness of breath and wheezing  Cardiovascular: Negative for chest pain, palpitations and leg swelling  Gastrointestinal: Negative for abdominal pain, constipation, diarrhea, nausea and vomiting  Genitourinary: Negative for difficulty urinating, dysuria and frequency  Musculoskeletal: Negative for arthralgias, gait problem and myalgias  Neurological: Negative for dizziness, seizures, syncope, weakness, light-headedness, numbness and headaches  Psychiatric/Behavioral: Negative for confusion, dysphoric mood and sleep disturbance  The patient is not nervous/anxious  PHQ-2/9 Depression Screening          Objective:  Vitals:    03/29/23 1016   BP: 130/72   BP Location: Left arm   Patient Position: Sitting   Cuff Size: Standard   Pulse: 86   Temp: 97 5 °F (36 4 °C)   SpO2: 90%   Weight: 70 8 kg (156 lb)   Height: 5' 7\" (1 702 m)     Body mass index is 24 43 kg/m²  Physical Exam  Vitals and nursing note reviewed  Constitutional:       General: He is not in acute distress  Appearance: Normal appearance  He is not ill-appearing  HENT:      Head: Normocephalic and atraumatic        Mouth/Throat:     " Mouth: Mucous membranes are moist    Eyes:      Extraocular Movements: Extraocular movements intact  Conjunctiva/sclera: Conjunctivae normal       Pupils: Pupils are equal, round, and reactive to light  Neck:      Vascular: No carotid bruit  Cardiovascular:      Rate and Rhythm: Normal rate and regular rhythm  Heart sounds: Normal heart sounds  Pulmonary:      Effort: Pulmonary effort is normal  No respiratory distress  Breath sounds: Normal breath sounds  No wheezing, rhonchi or rales  Abdominal:      General: Bowel sounds are normal  There is no distension  Palpations: Abdomen is soft  Tenderness: There is no abdominal tenderness  Musculoskeletal:      Cervical back: Neck supple  Right lower leg: No edema  Left lower leg: No edema  Neurological:      General: No focal deficit present  Mental Status: He is alert and oriented to person, place, and time  Mental status is at baseline  Psychiatric:         Mood and Affect: Mood normal          Behavior: Behavior normal          Thought Content:  Thought content normal          Judgment: Judgment normal  Patient's belongings returned

## 2023-03-30 ENCOUNTER — OFFICE VISIT (OUTPATIENT)
Dept: PHYSICAL THERAPY | Facility: CLINIC | Age: 73
End: 2023-03-30

## 2023-03-30 DIAGNOSIS — M79.601 PARESTHESIA AND PAIN OF BOTH UPPER EXTREMITIES: Primary | ICD-10-CM

## 2023-03-30 DIAGNOSIS — R20.2 PARESTHESIA AND PAIN OF BOTH UPPER EXTREMITIES: Primary | ICD-10-CM

## 2023-03-30 DIAGNOSIS — M79.602 PARESTHESIA AND PAIN OF BOTH UPPER EXTREMITIES: Primary | ICD-10-CM

## 2023-03-30 NOTE — PROGRESS NOTES
"Daily Note     Today's date: 3/30/2023  Patient name: Tonio Ruiz  : 1950  MRN: [id number]  Referring provider: Ervin Clemente DO  Dx:   Encounter Diagnosis     ICD-10-CM    1  Paresthesia and pain of both upper extremities  R20 2     M79 601     M79 602                      Subjective: Patient reports his hands are getting better  He saw Dr Jw Neal yesterday  He has been wearing the night splint on his R hand and does not get the tingling at night now  Objective: See treatment diary below      Assessment: Tolerated treatment well  Tightness of wrist flexors and extensors persists b/l  Also demonstrates limited PIP and DIP flexion b/l in all digits  Instructed patient on self stretching for these joints  Patient exhibited good technique with therapeutic exercises and would benefit from continued PT      Plan: Continue per plan of care        Precautions: none  POC exp 21  Manuals 3/23 3/28 3/30     IASTM to forearm flexors  KG KG     Wrist flexion and extension stretching following STM  KG KG                             Ther Ex        Median nerve glides Instructed for HEP - see printout in chart, review prn         UBE for strength and endurance  120 rpm  5 mins fwd 120 rpm  3'fwd/3'retro     Wrist flexor forearm stretching  10\"x10 ea 10\"x10 ea     Wrist extensor forearm stretching        Wrist extension off edge of table with DB        Wrist flex/ext with Theraband bar        Thumb to finger opposition - each finger to thumb  x10 ea x10 ea     DIP and PIP flexion AROM  x10 ea With self overpressure at end range  5\" x20     MCP flexion AROM        Finger extension stretching (wrist neutral)  10\"x10 therapist assist 10\"x10 ea therapist assist             Therapeutic Activity        Theraputty gripping   Yellow putty  2 mins ea b/l     Theraputty pinching  -2 tip  -3 tip  -lateral/key pinch   1 min ea, b/l             Modalities        MHP prn                     "

## 2023-04-03 ENCOUNTER — TELEPHONE (OUTPATIENT)
Dept: NEUROLOGY | Facility: CLINIC | Age: 73
End: 2023-04-03

## 2023-04-03 NOTE — TELEPHONE ENCOUNTER
Pt can not make appt due to wife having surgery on 4/11 with Dr Tadeo Honeycutt    No available appts to r/s  Pt needs [place] [employer] forms filled out  Pt wife asked to keep appt for the time being but pt would like a call ASAP     Please assist

## 2023-04-03 NOTE — TELEPHONE ENCOUNTER
Recd vm:    Yes, She Ballard [phone]  We  will still keep the  april 11 appointment  Just call us back to confirm for April 11 with dr Andrew Anand  I returned call; reached vm, left message confirming appointment for 4/11 unless we hear otherwise from patient

## 2023-04-04 ENCOUNTER — OFFICE VISIT (OUTPATIENT)
Dept: PHYSICAL THERAPY | Facility: CLINIC | Age: 73
End: 2023-04-04

## 2023-04-04 DIAGNOSIS — R20.2 PARESTHESIA AND PAIN OF BOTH UPPER EXTREMITIES: Primary | ICD-10-CM

## 2023-04-04 DIAGNOSIS — M79.602 PARESTHESIA AND PAIN OF BOTH UPPER EXTREMITIES: Primary | ICD-10-CM

## 2023-04-04 DIAGNOSIS — M79.601 PARESTHESIA AND PAIN OF BOTH UPPER EXTREMITIES: Primary | ICD-10-CM

## 2023-04-04 NOTE — PROGRESS NOTES
"Daily Note     Today's date: 2023  Patient name: Mj Nicholas  : 1950  MRN: [id number]  Referring provider: Ирина Lutz DO  Dx:   Encounter Diagnosis     ICD-10-CM    1  Paresthesia and pain of both upper extremities  R20 2     M79 601     M79 602                      Subjective: Pt reported that he continues to wear his night splints to sleep  He does have decreased symptoms today  Objective: See treatment diary below      Assessment: Tolerated treatment well  Patient demonstrated fatigue post treatment, exhibited good technique with therapeutic exercises and would benefit from continued PT  No adverse affect to IASTM post Tx  Plan: Continue per plan of care  Progress treatment as tolerated         Precautions: none  POC exp 21  Manuals 3/23 3/28 3/30 4/4    IASTM to forearm flexors  KG KG TM    Wrist flexion and extension stretching following STM  KG KG TM                            Ther Ex        Median nerve glides Instructed for HEP - see printout in chart, review prn          UBE for strength and endurance  120 rpm  5 mins fwd 120 rpm  3'fwd/3'retro 120 rpm  3'fwd/3' retro    Wrist flexor forearm stretching  10\"x10 ea 10\"x10 ea 10\"x10 ea    Wrist extensor forearm stretching        Wrist extension off edge of table with DB        Wrist flex/ext with Theraband bar        Thumb to finger opposition - each finger to thumb  x10 ea x10 ea x20 ea    DIP and PIP flexion AROM  x10 ea With self overpressure at end range  5\" x20 With self overpressure at end range  5\" x20    MCP flexion AROM        Finger extension stretching (wrist neutral)  10\"x10 therapist assist 10\"x10 ea therapist assist 10\"x10 ea therapist assist            Therapeutic Activity        Theraputty gripping   Yellow putty  2 mins ea b/l Yellow putty  2 mins ea b/l    Theraputty pinching  -2 tip  -3 tip  -lateral/key pinch   1 min ea, b/l 1 min ea, b/l            Modalities        MHP prn                     "

## 2023-04-06 ENCOUNTER — OFFICE VISIT (OUTPATIENT)
Dept: PHYSICAL THERAPY | Facility: CLINIC | Age: 73
End: 2023-04-06

## 2023-04-06 DIAGNOSIS — R20.2 PARESTHESIA AND PAIN OF BOTH UPPER EXTREMITIES: Primary | ICD-10-CM

## 2023-04-06 DIAGNOSIS — M79.602 PARESTHESIA AND PAIN OF BOTH UPPER EXTREMITIES: Primary | ICD-10-CM

## 2023-04-06 DIAGNOSIS — M79.601 PARESTHESIA AND PAIN OF BOTH UPPER EXTREMITIES: Primary | ICD-10-CM

## 2023-04-06 NOTE — PROGRESS NOTES
"Daily Note     Today's date: 2023  Patient name: Albert Glynn  : 1950  MRN: [id number]  Referring provider: Lola Rider DO  Dx:   Encounter Diagnosis     ICD-10-CM    1  Paresthesia and pain of both upper extremities  R20 2     M79 601     M79 602                      Subjective: Patient reports he is doing better  His right hand sometimes has pain in the center of his palm  His wife will be having heart surgery next week so he is unsure of whether or not he will be able to make it to PT but will notify us next week  Objective: See treatment diary below      Assessment: Tolerated treatment well  Continues to require assistance with proper technique with stretching and assist with keeping count  Patient exhibited good technique with therapeutic exercises and would benefit from continued PT      Plan: Continue per plan of care  Progress treatment as tolerated         Precautions: none  POC exp 21  Manuals 3/23 3/28 3/30 4/4 4/6   IASTM to forearm flexors  KG KG TM KG   Wrist flexion and extension stretching following STM  KG KG TM KG                           Ther Ex        Median nerve glides Instructed for HEP - see printout in chart, review prn          UBE for strength and endurance  120 rpm  5 mins fwd 120 rpm  3'fwd/3'retro 120 rpm  3'fwd/3' retro 120 rpm 4'fwd/4'retro   Wrist flexor forearm stretching  10\"x10 ea 10\"x10 ea 10\"x10 ea 10\"x10 ea   Wrist extension off edge of table with DB        Wrist flex/ext with Theraband bar        Thumb to finger opposition - each finger to thumb  x10 ea x10 ea x20 ea With yellow putty  x3 mins ea hand   DIP and PIP flexion AROM  x10 ea With self overpressure at end range  5\" x20 With self overpressure at end range  5\" x20 With self overpressure at end range  5\" x20   MCP flexion AROM        Finger extension stretching (wrist neutral)  10\"x10 therapist assist 10\"x10 ea therapist assist 10\"x10 ea therapist assist 10\"x10 ea therapist assist         " Therapeutic Activity        Theraputty gripping   Yellow putty  2 mins ea b/l Yellow putty  2 mins ea b/l Yellow putty  2 mins ea b/l   Theraputty pinching  -2 tip  -3 tip  -lateral/key pinch   1 min ea, b/l 1 min ea, b/l            Modalities        MHP prn

## 2023-04-11 ENCOUNTER — APPOINTMENT (OUTPATIENT)
Dept: PHYSICAL THERAPY | Facility: CLINIC | Age: 73
End: 2023-04-11

## 2023-04-18 ENCOUNTER — APPOINTMENT (OUTPATIENT)
Dept: PHYSICAL THERAPY | Facility: CLINIC | Age: 73
End: 2023-04-18

## 2023-04-20 ENCOUNTER — APPOINTMENT (OUTPATIENT)
Dept: PHYSICAL THERAPY | Facility: CLINIC | Age: 73
End: 2023-04-20

## 2023-04-24 DIAGNOSIS — R56.9 SEIZURE (HCC): ICD-10-CM

## 2023-04-24 RX ORDER — DIVALPROEX SODIUM 500 MG/1
500 TABLET, DELAYED RELEASE ORAL EVERY 12 HOURS SCHEDULED
Qty: 28 TABLET | Refills: 0 | OUTPATIENT
Start: 2023-04-24

## 2023-04-24 NOTE — TELEPHONE ENCOUNTER
Patients wife called and stated that the patient needs a refill on his depakote however the patient is currently in [place], [place] helping his brother  If medication can be sent to [facility] on [address]  Phone #: [phone]    Please call Micaela Travis (wife) to confirm the medication was sent

## 2023-04-24 NOTE — TELEPHONE ENCOUNTER
Called and spoke to pt's wife   Stated that pt is in [place] taking care of his brother and requesting a 2 weeks supply of his Depakote send to [facility] on [address]     Please sign pended script if agreeable

## 2023-04-25 ENCOUNTER — APPOINTMENT (OUTPATIENT)
Dept: PHYSICAL THERAPY | Facility: CLINIC | Age: 73
End: 2023-04-25

## 2023-04-27 ENCOUNTER — APPOINTMENT (OUTPATIENT)
Dept: PHYSICAL THERAPY | Facility: CLINIC | Age: 73
End: 2023-04-27

## 2023-05-15 DIAGNOSIS — E78.3 MIXED HYPERGLYCERIDEMIA: ICD-10-CM

## 2023-05-15 RX ORDER — ATORVASTATIN CALCIUM 10 MG/1
TABLET, FILM COATED ORAL
Qty: 90 TABLET | Refills: 0 | Status: SHIPPED | OUTPATIENT
Start: 2023-05-15

## 2023-05-30 ENCOUNTER — OFFICE VISIT (OUTPATIENT)
Dept: INTERNAL MEDICINE CLINIC | Facility: CLINIC | Age: 73
End: 2023-05-30

## 2023-05-30 VITALS
HEIGHT: 67 IN | DIASTOLIC BLOOD PRESSURE: 72 MMHG | OXYGEN SATURATION: 97 % | TEMPERATURE: 97.9 F | BODY MASS INDEX: 23.86 KG/M2 | HEART RATE: 89 BPM | WEIGHT: 152 LBS | SYSTOLIC BLOOD PRESSURE: 124 MMHG

## 2023-05-30 DIAGNOSIS — E55.9 VITAMIN D DEFICIENCY: ICD-10-CM

## 2023-05-30 DIAGNOSIS — D75.839 THROMBOCYTOSIS: ICD-10-CM

## 2023-05-30 DIAGNOSIS — Z12.5 SCREENING FOR PROSTATE CANCER: ICD-10-CM

## 2023-05-30 DIAGNOSIS — R79.89 ABNORMAL THYROID BLOOD TEST: ICD-10-CM

## 2023-05-30 DIAGNOSIS — Z13.31 NEGATIVE DEPRESSION SCREENING: ICD-10-CM

## 2023-05-30 DIAGNOSIS — R56.9 SEIZURE (HCC): Primary | ICD-10-CM

## 2023-05-30 DIAGNOSIS — E80.6 HYPERBILIRUBINEMIA: ICD-10-CM

## 2023-05-30 DIAGNOSIS — Z87.891 FORMER SMOKER: ICD-10-CM

## 2023-05-30 DIAGNOSIS — E78.2 MIXED HYPERLIPIDEMIA: ICD-10-CM

## 2023-05-30 DIAGNOSIS — R73.03 PREDIABETES: ICD-10-CM

## 2023-05-30 DIAGNOSIS — Z00.00 MEDICARE ANNUAL WELLNESS VISIT, SUBSEQUENT: ICD-10-CM

## 2023-05-30 RX ORDER — ERGOCALCIFEROL 1.25 MG/1
50000 CAPSULE ORAL WEEKLY
Qty: 12 CAPSULE | Refills: 3 | Status: SHIPPED | OUTPATIENT
Start: 2023-05-30

## 2023-05-30 NOTE — PATIENT INSTRUCTIONS
Medicare Preventive Visit Patient Instructions  Thank you for completing your Welcome to Medicare Visit or Medicare Annual Wellness Visit today  Your next wellness visit will be due in one year (5/30/2024)  The screening/preventive services that you may require over the next 5-10 years are detailed below  Some tests may not apply to you based off risk factors and/or age  Screening tests ordered at today's visit but not completed yet may show as past due  Also, please note that scanned in results may not display below  Preventive Screenings:  Service Recommendations Previous Testing/Comments   Colorectal Cancer Screening  Colonoscopy    Fecal Occult Blood Test (FOBT)/Fecal Immunochemical Test (FIT)  Fecal DNA/Cologuard Test  Flexible Sigmoidoscopy Age: 39-70 years old   Colonoscopy: every 10 years (May be performed more frequently if at higher risk)  OR  FOBT/FIT: every 1 year  OR  Cologuard: every 3 years  OR  Sigmoidoscopy: every 5 years  Screening may be recommended earlier than age 39 if at higher risk for colorectal cancer  Also, an individualized decision between you and your healthcare provider will decide whether screening between the ages of 74-80 would be appropriate   Colonoscopy: 05/11/2022  FOBT/FIT: Not on file  Cologuard: 05/11/2022  Sigmoidoscopy: Not on file    Screening Current     Prostate Cancer Screening Individualized decision between patient and health care provider in men between ages of 53-78   Medicare will cover every 12 months beginning on the day after your 50th birthday PSA: 1 4 ng/mL           Hepatitis C Screening Once for adults born between 1945 and 1965  More frequently in patients at high risk for Hepatitis C Hep C Antibody: 04/14/2022    Screening Current   Diabetes Screening 1-2 times per year if you're at risk for diabetes or have pre-diabetes Fasting glucose: 99 mg/dL (3/13/2023)  A1C: 5 7 % (3/13/2023)  Screening Current   Cholesterol Screening Once every 5 years if you don't have a lipid disorder  May order more often based on risk factors  Lipid panel: 04/14/2022  Screening Not Indicated  History Lipid Disorder      Other Preventive Screenings Covered by Medicare:  Abdominal Aortic Aneurysm (AAA) Screening: covered once if your at risk  You're considered to be at risk if you have a family history of AAA or a male between the age of 73-68 who smoking at least 100 cigarettes in your lifetime  Lung Cancer Screening: covers low dose CT scan once per year if you meet all of the following conditions: (1) Age 50-69; (2) No signs or symptoms of lung cancer; (3) Current smoker or have quit smoking within the last 15 years; (4) You have a tobacco smoking history of at least 20 pack years (packs per day x number of years you smoked); (5) You get a written order from a healthcare provider  Glaucoma Screening: covered annually if you're considered high risk: (1) You have diabetes OR (2) Family history of glaucoma OR (3)  aged 48 and older OR (3)  American aged 72 and older  Osteoporosis Screening: covered every 2 years if you meet one of the following conditions: (1) Have a vertebral abnormality; (2) On glucocorticoid therapy for more than 3 months; (3) Have primary hyperparathyroidism; (4) On osteoporosis medications and need to assess response to drug therapy  HIV Screening: covered annually if you're between the age of 12-76  Also covered annually if you are younger than 13 and older than 72 with risk factors for HIV infection  For pregnant patients, it is covered up to 3 times per pregnancy      Immunizations:  Immunization Recommendations   Influenza Vaccine Annual influenza vaccination during flu season is recommended for all persons aged >= 6 months who do not have contraindications   Pneumococcal Vaccine   * Pneumococcal conjugate vaccine = PCV13 (Prevnar 13), PCV15 (Vaxneuvance), PCV20 (Prevnar 20)  * Pneumococcal polysaccharide vaccine = PPSV23 (Pneumovax) Adults 25-60 years old: 1-3 doses may be recommended based on certain risk factors  Adults 72 years old: 1-2 doses may be recommended based off what pneumonia vaccine you previously received   Hepatitis B Vaccine 3 dose series if at intermediate or high risk (ex: diabetes, end stage renal disease, liver disease)   Tetanus (Td) Vaccine - COST NOT COVERED BY MEDICARE PART B Following completion of primary series, a booster dose should be given every 10 years to maintain immunity against tetanus  Td may also be given as tetanus wound prophylaxis  Tdap Vaccine - COST NOT COVERED BY MEDICARE PART B Recommended at least once for all adults  For pregnant patients, recommended with each pregnancy  Shingles Vaccine (Shingrix) - COST NOT COVERED BY MEDICARE PART B  2 shot series recommended in those aged 48 and above     Health Maintenance Due:      Topic Date Due    Colorectal Cancer Screening  05/11/2025    Hepatitis C Screening  Completed     Immunizations Due:      Topic Date Due    COVID-19 Vaccine (1) Never done    Hepatitis A Vaccine (1 of 2 - Risk 2-dose series) Never done    Hepatitis B Vaccine (1 of 3 - Risk 3-dose series) Never done     Advance Directives   What are advance directives? Advance directives are legal documents that state your wishes and plans for medical care  These plans are made ahead of time in case you lose your ability to make decisions for yourself  Advance directives can apply to any medical decision, such as the treatments you want, and if you want to donate organs  What are the types of advance directives? There are many types of advance directives, and each state has rules about how to use them  You may choose a combination of any of the following:  Living will: This is a written record of the treatment you want  You can also choose which treatments you do not want, which to limit, and which to stop at a certain time  This includes surgery, medicine, IV fluid, and tube feedings  Durable power of  for healthcare [facility]): This is a written record that states who you want to make healthcare choices for you when you are unable to make them for yourself  This person, called a proxy, is usually a family member or a friend  You may choose more than 1 proxy  Do not resuscitate (DNR) order:  A DNR order is used in case your heart stops beating or you stop breathing  It is a request not to have certain forms of treatment, such as CPR  A DNR order may be included in other types of advance directives  Medical directive: This covers the care that you want if you are in a coma, near death, or unable to make decisions for yourself  You can list the treatments you want for each condition  Treatment may include pain medicine, surgery, blood transfusions, dialysis, IV or tube feedings, and a ventilator (breathing machine)  Values history: This document has questions about your views, beliefs, and how you feel and think about life  This information can help others choose the care that you would choose  Why are advance directives important? An advance directive helps you control your care  Although spoken wishes may be used, it is better to have your wishes written down  Spoken wishes can be misunderstood, or not followed  Treatments may be given even if you do not want them  An advance directive may make it easier for your family to make difficult choices about your care  Cigarette Smoking and Your Health   Risks to your health if you smoke:  Nicotine and other chemicals found in tobacco damage every cell in your body  Even if you are a light smoker, you have an increased risk for cancer, heart disease, and lung disease  If you are pregnant or have diabetes, smoking increases your risk for complications  Benefits to your health if you stop smoking: You decrease respiratory symptoms such as coughing, wheezing, and shortness of breath     You reduce your risk for cancers of the lung, mouth, throat, kidney, bladder, pancreas, stomach, and cervix  If you already have cancer, you increase the benefits of chemotherapy  You also reduce your risk for cancer returning or a second cancer from developing  You reduce your risk for heart disease, blood clots, heart attack, and stroke  You reduce your risk for lung infections, and diseases such as pneumonia, asthma, chronic bronchitis, and emphysema  Your circulation improves  More oxygen can be delivered to your body  If you have diabetes, you lower your risk for complications, such as kidney, artery, and eye diseases  You also lower your risk for nerve damage  Nerve damage can lead to amputations, poor vision, and blindness  You improve your body's ability to heal and to fight infections  For more information and support to stop smoking:   [place]  Phone: 3- 942 - 995-5370  Web Address: www [employer]   [address] Information is for End User's use only and may not be sold, redistributed or otherwise used for commercial purposes  All illustrations and images included in CareNotes® are the copyrighted property of [employer]  or [facility] Preventive Visit Patient Instructions  Thank you for completing your Welcome to Medicare Visit or Medicare Annual Wellness Visit today  Your next wellness visit will be due in one year (5/30/2024)  The screening/preventive services that you may require over the next 5-10 years are detailed below  Some tests may not apply to you based off risk factors and/or age  Screening tests ordered at today's visit but not completed yet may show as past due  Also, please note that scanned in results may not display below    Preventive Screenings:  Service Recommendations Previous Testing/Comments   Colorectal Cancer Screening  Colonoscopy    Fecal Occult Blood Test (FOBT)/Fecal Immunochemical Test (FIT)  Fecal DNA/Cologuard Test  Flexible Sigmoidoscopy Age: 39-70 years old Colonoscopy: every 10 years (May be performed more frequently if at higher risk)  OR  FOBT/FIT: every 1 year  OR  Cologuard: every 3 years  OR  Sigmoidoscopy: every 5 years  Screening may be recommended earlier than age 39 if at higher risk for colorectal cancer  Also, an individualized decision between you and your healthcare provider will decide whether screening between the ages of 74-80 would be appropriate  Colonoscopy: 05/11/2022  FOBT/FIT: Not on file  Cologuard: 05/11/2022  Sigmoidoscopy: Not on file    Screening Current     Prostate Cancer Screening Individualized decision between patient and health care provider in men between ages of 53-78   Medicare will cover every 12 months beginning on the day after your 50th birthday PSA: 1 4 ng/mL           Hepatitis C Screening Once for adults born between 1945 and 1965  More frequently in patients at high risk for Hepatitis C Hep C Antibody: 04/14/2022    Screening Current   Diabetes Screening 1-2 times per year if you're at risk for diabetes or have pre-diabetes Fasting glucose: 99 mg/dL (3/13/2023)  A1C: 5 7 % (3/13/2023)  Screening Current   Cholesterol Screening Once every 5 years if you don't have a lipid disorder  May order more often based on risk factors  Lipid panel: 04/14/2022  Screening Not Indicated  History Lipid Disorder      Other Preventive Screenings Covered by Medicare:  Abdominal Aortic Aneurysm (AAA) Screening: covered once if your at risk  You're considered to be at risk if you have a family history of AAA or a male between the age of 73-68 who smoking at least 100 cigarettes in your lifetime    Lung Cancer Screening: covers low dose CT scan once per year if you meet all of the following conditions: (1) Age 50-69; (2) No signs or symptoms of lung cancer; (3) Current smoker or have quit smoking within the last 15 years; (4) You have a tobacco smoking history of at least 20 pack years (packs per day x number of years you smoked); (5) You get a written order from a healthcare provider  Glaucoma Screening: covered annually if you're considered high risk: (1) You have diabetes OR (2) Family history of glaucoma OR (3)  aged 48 and older OR (3)  American aged 72 and older  Osteoporosis Screening: covered every 2 years if you meet one of the following conditions: (1) Have a vertebral abnormality; (2) On glucocorticoid therapy for more than 3 months; (3) Have primary hyperparathyroidism; (4) On osteoporosis medications and need to assess response to drug therapy  HIV Screening: covered annually if you're between the age of 12-76  Also covered annually if you are younger than 13 and older than 72 with risk factors for HIV infection  For pregnant patients, it is covered up to 3 times per pregnancy  Immunizations:  Immunization Recommendations   Influenza Vaccine Annual influenza vaccination during flu season is recommended for all persons aged >= 6 months who do not have contraindications   Pneumococcal Vaccine   * Pneumococcal conjugate vaccine = PCV13 (Prevnar 13), PCV15 (Vaxneuvance), PCV20 (Prevnar 20)  * Pneumococcal polysaccharide vaccine = PPSV23 (Pneumovax) Adults 25-60 years old: 1-3 doses may be recommended based on certain risk factors  Adults 72 years old: 1-2 doses may be recommended based off what pneumonia vaccine you previously received   Hepatitis B Vaccine 3 dose series if at intermediate or high risk (ex: diabetes, end stage renal disease, liver disease)   Tetanus (Td) Vaccine - COST NOT COVERED BY MEDICARE PART B Following completion of primary series, a booster dose should be given every 10 years to maintain immunity against tetanus  Td may also be given as tetanus wound prophylaxis  Tdap Vaccine - COST NOT COVERED BY MEDICARE PART B Recommended at least once for all adults  For pregnant patients, recommended with each pregnancy     Shingles Vaccine (Shingrix) - COST NOT COVERED BY MEDICARE PART B  2 shot series recommended in those aged 48 and above     Health Maintenance Due:      Topic Date Due    Colorectal Cancer Screening  05/11/2025    Hepatitis C Screening  Completed     Immunizations Due:      Topic Date Due    COVID-19 Vaccine (1) Never done    Hepatitis A Vaccine (1 of 2 - Risk 2-dose series) Never done    Hepatitis B Vaccine (1 of 3 - Risk 3-dose series) Never done     Advance Directives   What are advance directives? Advance directives are legal documents that state your wishes and plans for medical care  These plans are made ahead of time in case you lose your ability to make decisions for yourself  Advance directives can apply to any medical decision, such as the treatments you want, and if you want to donate organs  What are the types of advance directives? There are many types of advance directives, and each state has rules about how to use them  You may choose a combination of any of the following:  Living will: This is a written record of the treatment you want  You can also choose which treatments you do not want, which to limit, and which to stop at a certain time  This includes surgery, medicine, IV fluid, and tube feedings  Durable power of  for healthcare [facility]): This is a written record that states who you want to make healthcare choices for you when you are unable to make them for yourself  This person, called a proxy, is usually a family member or a friend  You may choose more than 1 proxy  Do not resuscitate (DNR) order:  A DNR order is used in case your heart stops beating or you stop breathing  It is a request not to have certain forms of treatment, such as CPR  A DNR order may be included in other types of advance directives  Medical directive: This covers the care that you want if you are in a coma, near death, or unable to make decisions for yourself  You can list the treatments you want for each condition   Treatment may include pain medicine, surgery, blood transfusions, dialysis, IV or tube feedings, and a ventilator (breathing machine)  Values history: This document has questions about your views, beliefs, and how you feel and think about life  This information can help others choose the care that you would choose  Why are advance directives important? An advance directive helps you control your care  Although spoken wishes may be used, it is better to have your wishes written down  Spoken wishes can be misunderstood, or not followed  Treatments may be given even if you do not want them  An advance directive may make it easier for your family to make difficult choices about your care  Cigarette Smoking and Your Health   Risks to your health if you smoke:  Nicotine and other chemicals found in tobacco damage every cell in your body  Even if you are a light smoker, you have an increased risk for cancer, heart disease, and lung disease  If you are pregnant or have diabetes, smoking increases your risk for complications  Benefits to your health if you stop smoking: You decrease respiratory symptoms such as coughing, wheezing, and shortness of breath  You reduce your risk for cancers of the lung, mouth, throat, kidney, bladder, pancreas, stomach, and cervix  If you already have cancer, you increase the benefits of chemotherapy  You also reduce your risk for cancer returning or a second cancer from developing  You reduce your risk for heart disease, blood clots, heart attack, and stroke  You reduce your risk for lung infections, and diseases such as pneumonia, asthma, chronic bronchitis, and emphysema  Your circulation improves  More oxygen can be delivered to your body  If you have diabetes, you lower your risk for complications, such as kidney, artery, and eye diseases  You also lower your risk for nerve damage  Nerve damage can lead to amputations, poor vision, and blindness  You improve your body's ability to heal and to fight infections    For more information and support to stop smoking:   Smokefree  gov  Phone: 3- 274 - 897-2607  Web Address: www [employer]  [address] Information is for End User's use only and may not be sold, redistributed or otherwise used for commercial purposes  All illustrations and images included in CareNotes® are the copyrighted property of [employer]  or [facility] Smoking and Your Health   AMBULATORY CARE:   Risks to your health if you smoke:  Nicotine and other chemicals found in tobacco and e-cigarettes can damage every cell in your body  Even if you are a light smoker, you have an increased risk for cancer, heart disease, and lung disease  If you are pregnant or have diabetes, smoking increases your risk for complications  Nicotine can affect an adolescent's developing brain  This can lead to trouble thinking, learning, or paying attention  Benefits to your health if you stop smoking: You decrease respiratory symptoms such as coughing, wheezing, and shortness of breath  You reduce your risk for cancers of the lung, mouth, throat, kidney, bladder, pancreas, stomach, and cervix  If you already have cancer, you increase the benefits of chemotherapy  You also reduce your risk for cancer returning or a second cancer from developing  You reduce your risk for heart disease, blood clots, heart attack, and stroke  You reduce your risk for lung infections, and diseases such as pneumonia, asthma, chronic bronchitis, and emphysema  Your circulation improves  More oxygen can be delivered to your body  If you have diabetes, you lower your risk for complications, such as kidney, artery, and eye diseases  You also lower your risk for nerve damage  Nerve damage can lead to amputations, poor vision, and blindness  You improve your body's ability to heal and to fight infections  An adolescent can help his or her brain and body develop in a healthy way   Talk to your adolescent about all the health risks of nicotine  If you can, start talking about nicotine when your child is younger than 12 years  This may make it easier for him or her not to start using nicotine as a teenager or adult  Explain to him or her that it is best never to start  It can be hard to try to quit later  Benefits to the health of others if you stop smoking:  Tobacco is harmful to nonsmokers who breathe in your secondhand smoke  The following are ways the health of others around you may improve when you stop smoking: You lower the risks for lung cancer and heart disease in nonsmoking adults  If you are pregnant, you lower the risk for miscarriage, early delivery, low birth weight, and stillbirth  You also lower your baby's risk for SIDS, obesity, developmental delay, and neurobehavioral problems, such as ADHD  If you have children, you lower their risk for ear infections, colds, pneumonia, bronchitis, and asthma  Follow up with your doctor as directed:  Write down your questions so you remember to ask them during your visits  For support and more information:   American [employer] Association  [address] [facility],[address]  [address]  Phone: [facility] [address]  Phone: 4- 974 - 489-6182  Web Address: Rosemary Smith  [employer]    Smokefree  gov  Phone: 9- 678 - 819-3275  Web Address: www smokefree  gov  © Copyright [facility] 2022 Information is for End User's use only and may not be sold, redistributed or otherwise used for commercial purposes  The above information is an  only  It is not intended as medical advice for individual conditions or treatments  Talk to your doctor, nurse or pharmacist before following any medical regimen to see if it is safe and effective for you

## 2023-05-30 NOTE — PROGRESS NOTES
Assessment and Plan:     Problem List Items Addressed This Visit        Hematopoietic and Hemostatic    Thrombocytosis       Other    Vitamin D deficiency    Seizure ([facility] [address] ) - Primary    Relevant Orders    Valproic acid level, total    Prediabetes    Relevant Orders    TSH, 3rd generation with Free T4 reflex    Mixed hyperlipidemia    Relevant Orders    Lipid Panel with Direct LDL reflex    Hyperbilirubinemia    Former smoker    Abnormal thyroid blood test    Relevant Orders    TSH, 3rd generation with Free T4 reflex   Other Visit Diagnoses     Medicare annual wellness visit, subsequent        Negative depression screening        Screening for prostate cancer        Relevant Orders    PSA, Total Screen           Preventive health issues were discussed with patient, and age appropriate screening tests were ordered as noted in patient's After Visit Summary  Personalized health advice and appropriate referrals for health education or preventive services given if needed, as noted in patient's After Visit Summary       History of Present Illness:     Patient presents for a Medicare Wellness Visit    HPI   Patient Care Team:  Cathi Zimmerman DO as PCP - General (Internal Medicine)     Review of Systems:     Review of Systems     Problem List:     Patient Active Problem List   Diagnosis   • Mixed hyperlipidemia   • Hyperbilirubinemia   • Former smoker   • Recurrent episodes of unresponsiveness   • Seizure ([facility] [address] )   • Vitamin D deficiency   • Prediabetes   • Abnormal thyroid blood test   • Thrombocytosis   • Platelets decreased (HCC)      Past Medical and Surgical History:     Past Medical History:   Diagnosis Date   • High cholesterol      Past Surgical History:   Procedure Laterality Date   • HERNIA REPAIR      when he was a kid   • WRIST SURGERY Right     when he was a kid      Family History:     Family History   Problem Relation Age of Onset   • Cancer Mother    • Diabetes Mother    • Prostate cancer Father    • Cancer Brother       Social History:     Social History     Socioeconomic History   • Marital status: /Civil Union     Spouse name: None   • Number of children: None   • Years of education: None   • Highest education level: None   Occupational History   • Occupation: retired   Tobacco Use   • Smoking status: Light Smoker     Types: Cigars   • Smokeless tobacco: Never   • Tobacco comments:     cigar use - social    Vaping Use   • Vaping Use: Never used   Substance and Sexual Activity   • Alcohol use: Yes     Comment: two beers/ day and occassional wine   • Drug use: None   • Sexual activity: Yes     Partners: Female   Other Topics Concern   • None   Social History Narrative        One child    Retired nicole    Lives with his wife  Social Determinants of Health     Financial Resource Strain: Low Risk  (5/30/2023)    Overall Financial Resource Strain (CARDIA)    • Difficulty of Paying Living Expenses: Not hard at all   Food Insecurity: Not on file   Transportation Needs: No Transportation Needs (5/30/2023)    PRAPARE - Transportation    • Lack of Transportation (Medical): No    • Lack of Transportation (Non-Medical): No   Physical Activity: Not on file   Stress: Not on file   Social Connections: Not on file   Intimate Partner Violence: Not on file   Housing Stability: Not on file      Medications and Allergies:     Current Outpatient Medications   Medication Sig Dispense Refill   • atorvastatin (LIPITOR) 10 mg tablet TAKE ONE TABLET DAILY    90 tablet 0   • divalproex sodium (Depakote) 500 mg EC tablet Take 1 tablet (500 mg total) by mouth every 12 (twelve) hours 180 tablet 3   • ergocalciferol (VITAMIN D2) 50,000 units Take 1 capsule (50,000 Units total) by mouth once a week 12 capsule 3   • meloxicam (MOBIC) 7 5 mg tablet Take 1 tablet (7 5 mg total) by mouth daily 30 tablet 0     No current facility-administered medications for this visit       No Known Allergies   Immunizations:     Immunization History   Administered Date(s) Administered   • Influenza, high dose seasonal 0 7 mL 10/18/2022   • Pneumococcal Conjugate Vaccine 20-valent (Pcv20), Polysace 04/14/2022      Health Maintenance:         Topic Date Due   • Colorectal Cancer Screening  05/11/2025   • Hepatitis C Screening  Completed         Topic Date Due   • COVID-19 Vaccine (1) Never done   • Hepatitis A Vaccine (1 of 2 - Risk 2-dose series) Never done   • Hepatitis B Vaccine (1 of 3 - Risk 3-dose series) Never done      Medicare Screening Tests and Risk Assessments:     Alo Fofana is here for his Subsequent Wellness visit  Last Medicare Wellness visit information reviewed, patient interviewed and updates made to the record today  Health Risk Assessment:   Patient rates overall health as good  Patient feels that their physical health rating is same  Patient is satisfied with their life  Eyesight was rated as same  Hearing was rated as same  Patient feels that their emotional and mental health rating is same  Patients states they are never, rarely angry  Patient states they are never, rarely unusually tired/fatigued  Pain experienced in the last 7 days has been none  Patient states that he has experienced no weight loss or gain in last 6 months  Depression Screening:   PHQ-2 Score: 0      Fall Risk Screening: In the past year, patient has experienced: no history of falling in past year      Home Safety:  Patient does not have trouble with stairs inside or outside of their home  Patient has working smoke alarms and has working carbon monoxide detector  Home safety hazards include: none  Nutrition:   Current diet is Regular  Medications:   Patient is not currently taking any over-the-counter supplements  Patient is able to manage medications       Activities of Daily Living (ADLs)/Instrumental Activities of Daily Living (IADLs):   Walk and transfer into and out of bed and chair?: Yes  Dress and groom yourself?: Yes    Bathe or shower yourself?: Yes    Feed yourself? Yes  Do your laundry/housekeeping?: Yes  Manage your money, pay your bills and track your expenses?: Yes  Make your own meals?: Yes    Do your own shopping?: Yes    Previous Hospitalizations:   Any hospitalizations or ED visits within the last 12 months?: No      Advance Care Planning:   Living will: Yes    Advanced directive: Yes    Advanced directive counseling given: Yes    Five wishes given: No    Patient declined ACP directive: No    End of Life Decisions reviewed with patient: Yes    Provider agrees with end of life decisions: Yes      Cognitive Screening:   Provider or family/friend/caregiver concerned regarding cognition?: No    PREVENTIVE SCREENINGS      Cardiovascular Screening:    General: Screening Not Indicated and History Lipid Disorder    Due for: Lipid Panel      Diabetes Screening:     General: Screening Current    Due for: Blood Glucose      Colorectal Cancer Screening:     General: Screening Current      Prostate Cancer Screening:    General: Risks and Benefits Discussed    Due for: PSA      Osteoporosis Screening:    General: Screening Not Indicated      Abdominal Aortic Aneurysm (AAA) Screening:    Risk factors include: age between 73-69 yo and tobacco use        Lung Cancer Screening:     General: Screening Not Indicated      Hepatitis C Screening:    General: Screening Current    Screening, Brief Intervention, and Referral to Treatment (SBIRT)    Screening  Typical number of drinks in a day: 0  Typical number of drinks in a week: 0  Interpretation: Low risk drinking behavior      Single Item Drug Screening:  How often have you used an illegal drug (including marijuana) or a prescription medication for non-medical reasons in the past year? never    Single Item Drug Screen Score: 0  Interpretation: Negative screen for possible drug use disorder    Other Counseling Topics:   Car/seat belt/driving safety, sunscreen and calcium and vitamin D intake and regular "weightbearing exercise  No results found  Physical Exam:     /72   Pulse 89   Temp 97 9 °F (36 6 °C)   Ht 5' 7\" (1 702 m)   Wt 68 9 kg (152 lb)   SpO2 97%   BMI 23 81 kg/m²     Physical Exam   A/P: Doing well and no falls reported  Denies depression and feels safe at home  Diverse diet  No problems operating a MV and uses seat belts  Has a living will and POA  No DME or referrals needed today  RTC one year for medicare wellness       Meena Powell, DO    "

## 2023-05-30 NOTE — PROGRESS NOTES
Assessment/Plan:  Problem List Items Addressed This Visit        Hematopoietic and Hemostatic    Thrombocytosis       Other    Vitamin D deficiency    Seizure ([facility] [address] ) - Primary    Relevant Orders    Valproic acid level, total    Prediabetes    Relevant Orders    TSH, 3rd generation with Free T4 reflex    Mixed hyperlipidemia    Relevant Orders    Lipid Panel with Direct LDL reflex    Hyperbilirubinemia    Former smoker    Abnormal thyroid blood test    Relevant Orders    TSH, 3rd generation with Free T4 reflex   Other Visit Diagnoses     Medicare annual wellness visit, subsequent        Negative depression screening        Screening for prostate cancer        Relevant Orders    PSA, Total Screen           Diagnoses and all orders for this visit:    Seizure ([facility] [address] )  -     Valproic acid level, total; Future    Medicare annual wellness visit, subsequent    Mixed hyperlipidemia  -     Lipid Panel with Direct LDL reflex; Future    Hyperbilirubinemia    Vitamin D deficiency    Prediabetes  -     TSH, 3rd generation with Free T4 reflex; Future    Abnormal thyroid blood test  -     TSH, 3rd generation with Free T4 reflex; Future    Thrombocytosis    Negative depression screening    Screening for prostate cancer  -     PSA, Total Screen; Future    Former smoker        No problem-specific Assessment & Plan notes found for this encounter  A/P: Doing ok and will check labs  Discussed BMI and is normal  To continue current program   Continue current treatment and RTC six months for routine  Subjective:      Patient ID: Vale Acosta is a 68 y o  male  WM RTC for f/u sz, prediabetes, etc  Doing ok and no new issues  Remains active w/o difficulty and no falls  No sz activity to report  Still off cigars  Has returned to driving  Due for labs  The following portions of the patient's history were reviewed and updated as appropriate:   He has a past medical history of High cholesterol  ,  does not have any pertinent problems on file  ,   has a past surgical history that includes Wrist surgery (Right) and Hernia repair  ,  family history includes Cancer in his brother and mother; Diabetes in his mother; Prostate cancer in his father  ,   reports that he has been smoking cigars  He has never used smokeless tobacco  He reports current alcohol use  No history on file for drug use ,  has No Known Allergies     Current Outpatient Medications   Medication Sig Dispense Refill   • atorvastatin (LIPITOR) 10 mg tablet TAKE ONE TABLET DAILY    90 tablet 0   • divalproex sodium (Depakote) 500 mg EC tablet Take 1 tablet (500 mg total) by mouth every 12 (twelve) hours 180 tablet 3   • ergocalciferol (VITAMIN D2) 50,000 units Take 1 capsule (50,000 Units total) by mouth once a week 12 capsule 3   • meloxicam (MOBIC) 7 5 mg tablet Take 1 tablet (7 5 mg total) by mouth daily 30 tablet 0     No current facility-administered medications for this visit  Review of Systems   Constitutional: Negative for activity change, chills, diaphoresis, fatigue and fever  HENT: Negative  Eyes: Negative for visual disturbance  Respiratory: Negative for cough, chest tightness, shortness of breath and wheezing  Cardiovascular: Negative for chest pain, palpitations and leg swelling  Gastrointestinal: Negative for abdominal pain, constipation, diarrhea, nausea and vomiting  Endocrine: Negative for cold intolerance and heat intolerance  Genitourinary: Negative for difficulty urinating, dysuria and frequency  Musculoskeletal: Negative for arthralgias, gait problem and myalgias  Neurological: Negative for dizziness, seizures, syncope, weakness, light-headedness and headaches  Psychiatric/Behavioral: Negative for confusion, dysphoric mood and sleep disturbance  The patient is not nervous/anxious          PHQ-2/9 Depression Screening    Little interest or pleasure in doing things: 0 - not at all  Feeling down, depressed, or hopeless: 0 - not at "all  PHQ-2 Score: 0  PHQ-2 Interpretation: Negative depression screen        Objective:  Vitals:    05/30/23 1417   BP: 124/72   Pulse: 89   Temp: 97 9 °F (36 6 °C)   SpO2: 97%   Weight: 68 9 kg (152 lb)   Height: 5' 7\" (1 702 m)     Body mass index is 23 81 kg/m²  Physical Exam  Vitals and nursing note reviewed  Constitutional:       General: He is not in acute distress  Appearance: Normal appearance  He is not ill-appearing  HENT:      Head: Normocephalic and atraumatic  Mouth/Throat:      Mouth: Mucous membranes are moist    Eyes:      Extraocular Movements: Extraocular movements intact  Conjunctiva/sclera: Conjunctivae normal       Pupils: Pupils are equal, round, and reactive to light  Neck:      Vascular: No carotid bruit  Cardiovascular:      Rate and Rhythm: Normal rate and regular rhythm  Heart sounds: Normal heart sounds  No murmur heard  Pulmonary:      Effort: Pulmonary effort is normal  No respiratory distress  Breath sounds: Normal breath sounds  No wheezing, rhonchi or rales  Abdominal:      General: Bowel sounds are normal  There is no distension  Palpations: Abdomen is soft  Tenderness: There is no abdominal tenderness  Musculoskeletal:      Cervical back: Neck supple  Right lower leg: No edema  Left lower leg: No edema  Neurological:      General: No focal deficit present  Mental Status: He is alert and oriented to person, place, and time  Mental status is at baseline  Psychiatric:         Mood and Affect: Mood normal          Behavior: Behavior normal          Thought Content:  Thought content normal          Judgment: Judgment normal          "

## 2023-05-31 ENCOUNTER — APPOINTMENT (OUTPATIENT)
Dept: LAB | Facility: CLINIC | Age: 73
End: 2023-05-31
Payer: MEDICARE

## 2023-05-31 DIAGNOSIS — R73.03 PREDIABETES: ICD-10-CM

## 2023-05-31 DIAGNOSIS — Z12.5 SCREENING FOR PROSTATE CANCER: ICD-10-CM

## 2023-05-31 DIAGNOSIS — R56.9 SEIZURE (HCC): ICD-10-CM

## 2023-05-31 DIAGNOSIS — E78.2 MIXED HYPERLIPIDEMIA: ICD-10-CM

## 2023-05-31 DIAGNOSIS — R79.89 ABNORMAL THYROID BLOOD TEST: ICD-10-CM

## 2023-05-31 DIAGNOSIS — R79.9 ABNORMAL FINDING OF BLOOD CHEMISTRY, UNSPECIFIED: ICD-10-CM

## 2023-05-31 DIAGNOSIS — R94.6 ABNORMAL RESULTS OF THYROID FUNCTION STUDIES: ICD-10-CM

## 2023-05-31 LAB
CHOLEST SERPL-MCNC: 143 MG/DL
HDLC SERPL-MCNC: 63 MG/DL
LDLC SERPL CALC-MCNC: 60 MG/DL (ref 0–100)
PSA SERPL-MCNC: 0.99 NG/ML (ref 0–4)
T3 SERPL-MCNC: 1.1 NG/ML
T4 FREE SERPL-MCNC: 0.65 NG/DL (ref 0.61–1.12)
TRIGL SERPL-MCNC: 98 MG/DL
TSH SERPL DL<=0.05 MIU/L-ACNC: 6.55 UIU/ML (ref 0.45–4.5)
VALPROATE SERPL-MCNC: 80 UG/ML (ref 50–100)

## 2023-05-31 PROCEDURE — 80164 ASSAY DIPROPYLACETIC ACD TOT: CPT

## 2023-05-31 PROCEDURE — 84480 ASSAY TRIIODOTHYRONINE (T3): CPT

## 2023-05-31 PROCEDURE — 80061 LIPID PANEL: CPT

## 2023-05-31 PROCEDURE — 86800 THYROGLOBULIN ANTIBODY: CPT

## 2023-05-31 PROCEDURE — 84439 ASSAY OF FREE THYROXINE: CPT

## 2023-05-31 PROCEDURE — 36415 COLL VENOUS BLD VENIPUNCTURE: CPT

## 2023-05-31 PROCEDURE — 86376 MICROSOMAL ANTIBODY EACH: CPT

## 2023-05-31 PROCEDURE — G0103 PSA SCREENING: HCPCS

## 2023-05-31 PROCEDURE — 84443 ASSAY THYROID STIM HORMONE: CPT

## 2023-06-01 LAB
THYROGLOB AB SERPL-ACNC: <1 IU/ML (ref 0–0.9)
THYROPEROXIDASE AB SERPL-ACNC: <9 IU/ML (ref 0–34)

## 2023-06-05 DIAGNOSIS — E03.9 ACQUIRED HYPOTHYROIDISM: Primary | ICD-10-CM

## 2023-06-05 DIAGNOSIS — E07.89 OTHER SPECIFIED DISORDERS OF THYROID: ICD-10-CM

## 2023-06-05 DIAGNOSIS — R79.89 ABNORMAL THYROID BLOOD TEST: Primary | ICD-10-CM

## 2023-06-05 RX ORDER — LEVOTHYROXINE SODIUM 0.1 MG/1
100 TABLET ORAL
Qty: 90 TABLET | Refills: 0 | Status: SHIPPED | OUTPATIENT
Start: 2023-06-05

## 2023-07-28 ENCOUNTER — APPOINTMENT (EMERGENCY)
Dept: CT IMAGING | Facility: HOSPITAL | Age: 73
End: 2023-07-28
Payer: MEDICARE

## 2023-07-28 ENCOUNTER — HOSPITAL ENCOUNTER (EMERGENCY)
Facility: HOSPITAL | Age: 73
Discharge: HOME | End: 2023-07-28
Attending: EMERGENCY MEDICINE
Payer: MEDICARE

## 2023-07-28 VITALS
RESPIRATION RATE: 18 BRPM | DIASTOLIC BLOOD PRESSURE: 75 MMHG | OXYGEN SATURATION: 99 % | HEART RATE: 62 BPM | SYSTOLIC BLOOD PRESSURE: 142 MMHG | TEMPERATURE: 97.6 F

## 2023-07-28 DIAGNOSIS — R10.9 LEFT FLANK PAIN: Primary | ICD-10-CM

## 2023-07-28 LAB
ALBUMIN SERPL BCP-MCNC: 4 G/DL (ref 3.5–5)
ALP SERPL-CCNC: 44 U/L (ref 34–104)
ALT SERPL W P-5'-P-CCNC: 18 U/L (ref 7–52)
ANION GAP SERPL CALCULATED.3IONS-SCNC: 5 MMOL/L
APTT PPP: 32 SECONDS (ref 23–37)
AST SERPL W P-5'-P-CCNC: 18 U/L (ref 13–39)
BASOPHILS # BLD AUTO: 0.03 THOUSANDS/ÂΜL (ref 0–0.1)
BASOPHILS NFR BLD AUTO: 0 % (ref 0–1)
BILIRUB SERPL-MCNC: 0.79 MG/DL (ref 0.2–1)
BILIRUB UR QL STRIP: NEGATIVE
BUN SERPL-MCNC: 15 MG/DL (ref 5–25)
CALCIUM SERPL-MCNC: 9 MG/DL (ref 8.4–10.2)
CHLORIDE SERPL-SCNC: 104 MMOL/L (ref 96–108)
CLARITY UR: CLEAR
CO2 SERPL-SCNC: 28 MMOL/L (ref 21–32)
COLOR UR: YELLOW
CREAT SERPL-MCNC: 0.77 MG/DL (ref 0.6–1.3)
EOSINOPHIL # BLD AUTO: 0.06 THOUSAND/ÂΜL (ref 0–0.61)
EOSINOPHIL NFR BLD AUTO: 1 % (ref 0–6)
ERYTHROCYTE [DISTWIDTH] IN BLOOD BY AUTOMATED COUNT: 12.5 % (ref 11.6–15.1)
GFR SERPL CREATININE-BSD FRML MDRD: 90 ML/MIN/1.73SQ M
GLUCOSE SERPL-MCNC: 94 MG/DL (ref 65–140)
GLUCOSE UR STRIP-MCNC: NEGATIVE MG/DL
HCT VFR BLD AUTO: 39.7 % (ref 36.5–49.3)
HGB BLD-MCNC: 13 G/DL (ref 12–17)
HGB UR QL STRIP.AUTO: NEGATIVE
IMM GRANULOCYTES # BLD AUTO: 0.03 THOUSAND/UL (ref 0–0.2)
IMM GRANULOCYTES NFR BLD AUTO: 0 % (ref 0–2)
INR PPP: 1.1 (ref 0.84–1.19)
KETONES UR STRIP-MCNC: NEGATIVE MG/DL
LEUKOCYTE ESTERASE UR QL STRIP: NEGATIVE
LYMPHOCYTES # BLD AUTO: 3.7 THOUSANDS/ÂΜL (ref 0.6–4.47)
LYMPHOCYTES NFR BLD AUTO: 38 % (ref 14–44)
MCH RBC QN AUTO: 30.5 PG (ref 26.8–34.3)
MCHC RBC AUTO-ENTMCNC: 32.7 G/DL (ref 31.4–37.4)
MCV RBC AUTO: 93 FL (ref 82–98)
MONOCYTES # BLD AUTO: 0.94 THOUSAND/ÂΜL (ref 0.17–1.22)
MONOCYTES NFR BLD AUTO: 10 % (ref 4–12)
NEUTROPHILS # BLD AUTO: 5.04 THOUSANDS/ÂΜL (ref 1.85–7.62)
NEUTS SEG NFR BLD AUTO: 51 % (ref 43–75)
NITRITE UR QL STRIP: NEGATIVE
NRBC BLD AUTO-RTO: 0 /100 WBCS
PH UR STRIP.AUTO: 7 [PH]
PLATELET # BLD AUTO: 143 THOUSANDS/UL (ref 149–390)
PMV BLD AUTO: 10.4 FL (ref 8.9–12.7)
POTASSIUM SERPL-SCNC: 4.1 MMOL/L (ref 3.5–5.3)
PROT SERPL-MCNC: 6.2 G/DL (ref 6.4–8.4)
PROT UR STRIP-MCNC: NEGATIVE MG/DL
PROTHROMBIN TIME: 14.2 SECONDS (ref 11.6–14.5)
RBC # BLD AUTO: 4.26 MILLION/UL (ref 3.88–5.62)
SODIUM SERPL-SCNC: 137 MMOL/L (ref 135–147)
SP GR UR STRIP.AUTO: 1.02
UROBILINOGEN UR QL STRIP.AUTO: 1 E.U./DL
WBC # BLD AUTO: 9.8 THOUSAND/UL (ref 4.31–10.16)

## 2023-07-28 PROCEDURE — 96374 THER/PROPH/DIAG INJ IV PUSH: CPT

## 2023-07-28 PROCEDURE — 85730 THROMBOPLASTIN TIME PARTIAL: CPT | Performed by: EMERGENCY MEDICINE

## 2023-07-28 PROCEDURE — 99284 EMERGENCY DEPT VISIT MOD MDM: CPT

## 2023-07-28 PROCEDURE — 99284 EMERGENCY DEPT VISIT MOD MDM: CPT | Performed by: EMERGENCY MEDICINE

## 2023-07-28 PROCEDURE — 85610 PROTHROMBIN TIME: CPT | Performed by: EMERGENCY MEDICINE

## 2023-07-28 PROCEDURE — 36415 COLL VENOUS BLD VENIPUNCTURE: CPT | Performed by: EMERGENCY MEDICINE

## 2023-07-28 PROCEDURE — 81003 URINALYSIS AUTO W/O SCOPE: CPT | Performed by: EMERGENCY MEDICINE

## 2023-07-28 PROCEDURE — 74176 CT ABD & PELVIS W/O CONTRAST: CPT

## 2023-07-28 PROCEDURE — 85025 COMPLETE CBC W/AUTO DIFF WBC: CPT | Performed by: EMERGENCY MEDICINE

## 2023-07-28 PROCEDURE — 80053 COMPREHEN METABOLIC PANEL: CPT | Performed by: EMERGENCY MEDICINE

## 2023-07-28 RX ORDER — KETOROLAC TROMETHAMINE 10 MG/1
10 TABLET, FILM COATED ORAL EVERY 6 HOURS PRN
Qty: 10 TABLET | Refills: 0 | Status: SHIPPED | OUTPATIENT
Start: 2023-07-28

## 2023-07-28 RX ORDER — KETOROLAC TROMETHAMINE 30 MG/ML
15 INJECTION, SOLUTION INTRAMUSCULAR; INTRAVENOUS ONCE
Status: COMPLETED | OUTPATIENT
Start: 2023-07-28 | End: 2023-07-28

## 2023-07-28 RX ADMIN — KETOROLAC TROMETHAMINE 15 MG: 30 INJECTION, SOLUTION INTRAMUSCULAR at 13:57

## 2023-07-28 NOTE — DISCHARGE INSTRUCTIONS
Return to the ER with any new, concerning, or worsening issues. For increasing pain use Toradol 1 pill every 6 hours as needed. Take with food. Do not take Advil, Motrin, ibuprofen, Aleve, Naprosyn, or aspirin while on Toradol. You may take Tylenol if necessary. Do not do any heavy lifting, bending, twisting, or stooping for 1 week.

## 2023-07-28 NOTE — ED PROVIDER NOTES
History  Chief Complaint   Patient presents with   • Flank Pain     Patient reports left flank pain for the past three days. Patient denies nausea or vomiting at this time. 77-year-old male presents emergency department complaining of left flank pain that is been ongoing for about 3 days. Patient notes that the pain wraps around to his left lower quadrant. The patient denies any nausea vomiting blood in the urine or fever or chills. The patient denies frequency. Patient notes that sometimes it feels like the pain is worse with movement but at other times it is not. The patient is here because he is concerned he may have a kidney stone and wanted to be evaluated for that. Patient is denying other issues at this time. Patient denies history of renal calculi. Prior to Admission Medications   Prescriptions Last Dose Informant Patient Reported? Taking?   atorvastatin (LIPITOR) 10 mg tablet   No No   Sig: TAKE ONE TABLET DAILY. ..   divalproex sodium (Depakote) 500 mg EC tablet   No No   Sig: Take 1 tablet (500 mg total) by mouth every 12 (twelve) hours   ergocalciferol (VITAMIN D2) 50,000 units   No No   Sig: Take 1 capsule (50,000 Units total) by mouth once a week   levothyroxine (Synthroid) 100 mcg tablet   No No   Sig: Take 1 tablet (100 mcg total) by mouth daily in the early morning   meloxicam (MOBIC) 7.5 mg tablet   No No   Sig: Take 1 tablet (7.5 mg total) by mouth daily      Facility-Administered Medications: None       Past Medical History:   Diagnosis Date   • High cholesterol        Past Surgical History:   Procedure Laterality Date   • HERNIA REPAIR      when he was a kid   • WRIST SURGERY Right     when he was a kid       Family History   Problem Relation Age of Onset   • Cancer Mother    • Diabetes Mother    • Prostate cancer Father    • Cancer Brother      I have reviewed and agree with the history as documented.     E-Cigarette/Vaping   • E-Cigarette Use Never User E-Cigarette/Vaping Substances   • Nicotine No    • THC No    • CBD No    • Flavoring No    • Other No    • Unknown No      Social History     Tobacco Use   • Smoking status: Light Smoker     Types: Cigars   • Smokeless tobacco: Never   • Tobacco comments:     cigar use - social    Vaping Use   • Vaping Use: Never used   Substance Use Topics   • Alcohol use: Yes     Comment: two beers/ day and occassional wine       Review of Systems   Constitutional: Positive for activity change. Negative for chills and fever. HENT: Negative for ear pain and sore throat. Eyes: Negative for pain and visual disturbance. Respiratory: Negative for cough and shortness of breath. Cardiovascular: Negative for chest pain and palpitations. Gastrointestinal: Negative for abdominal pain and vomiting. Genitourinary: Positive for flank pain. Negative for dysuria and hematuria. Musculoskeletal: Negative for arthralgias and back pain. Skin: Negative for color change and rash. All other systems reviewed and are negative. Physical Exam  Physical Exam  Vitals and nursing note reviewed. Constitutional:       General: He is not in acute distress. Appearance: He is well-developed. HENT:      Head: Normocephalic and atraumatic. Eyes:      Conjunctiva/sclera: Conjunctivae normal.   Cardiovascular:      Rate and Rhythm: Normal rate and regular rhythm. Heart sounds: No murmur heard. Pulmonary:      Effort: Pulmonary effort is normal. No respiratory distress. Breath sounds: Normal breath sounds. Abdominal:      Palpations: Abdomen is soft. Tenderness: There is no abdominal tenderness. There is left CVA tenderness. Musculoskeletal:         General: No swelling or tenderness. Cervical back: Neck supple. Comments: I cannot reproduce the patient's pain at this time with palpation, however the pain worsens with movement. There is negative straight leg raising.   There is no numbness or weakness of the lower extremities. Skin:     General: Skin is warm and dry. Capillary Refill: Capillary refill takes less than 2 seconds. Neurological:      Mental Status: He is alert.    Psychiatric:         Mood and Affect: Mood normal.         Vital Signs  ED Triage Vitals [07/28/23 1032]   Temperature Pulse Respirations Blood Pressure SpO2   97.6 °F (36.4 °C) 78 18 132/76 96 %      Temp Source Heart Rate Source Patient Position - Orthostatic VS BP Location FiO2 (%)   Oral -- -- -- --      Pain Score       5           Vitals:    07/28/23 1032 07/28/23 1117 07/28/23 1215   BP: 132/76 156/82 142/75   Pulse: 78 68 62         Visual Acuity      ED Medications  Medications   ketorolac (TORADOL) injection 15 mg (15 mg Intravenous Given 7/28/23 1357)       Diagnostic Studies  Results Reviewed     Procedure Component Value Units Date/Time    Comprehensive metabolic panel [[id number]]  (Abnormal) Collected: 07/28/23 1205    Lab Status: Final result Specimen: Blood from Arm, Left Updated: 07/28/23 1227     Sodium 137 mmol/L      Potassium 4.1 mmol/L      Chloride 104 mmol/L      CO2 28 mmol/L      ANION GAP 5 mmol/L      BUN 15 mg/dL      Creatinine 0.77 mg/dL      Glucose 94 mg/dL      Calcium 9.0 mg/dL      AST 18 U/L      ALT 18 U/L      Alkaline Phosphatase 44 U/L      Total Protein 6.2 g/dL      Albumin 4.0 g/dL      Total Bilirubin 0.79 mg/dL      eGFR 90 ml/min/1.73sq m     Narrative:      [facility] guidelines for Chronic Kidney Disease (CKD):   •  Stage 1 with normal or high GFR (GFR > 90 mL/min/1.73 square meters)  •  Stage 2 Mild CKD (GFR = 60-89 mL/min/1.73 square meters)  •  Stage 3A Moderate CKD (GFR = 45-59 mL/min/1.73 square meters)  •  Stage 3B Moderate CKD (GFR = 30-44 mL/min/1.73 square meters)  •  Stage 4 Severe CKD (GFR = 15-29 mL/min/1.73 square meters)  •  Stage 5 End Stage CKD (GFR <15 mL/min/1.73 square meters)  Note: GFR calculation is accurate only with a steady state creatinine    UA w Reflex to Microscopic w Reflex to Culture [[id number]]  (Normal) Collected: 07/28/23 1214    Lab Status: Final result Specimen: Urine, Clean Catch Updated: 07/28/23 1225     Color, UA Yellow     Clarity, UA Clear     Specific Gravity, UA 1.020     pH, UA 7.0     Leukocytes, UA Negative     Nitrite, UA Negative     Protein, UA Negative mg/dl      Glucose, UA Negative mg/dl      Ketones, UA Negative mg/dl      Urobilinogen, UA 1.0 E.U./dl      Bilirubin, UA Negative     Occult Blood, UA Negative    Protime-INR [[id number]]  (Normal) Collected: 07/28/23 1205    Lab Status: Final result Specimen: Blood from Arm, Left Updated: 07/28/23 1222     Protime 14.2 seconds      INR 1.10    APTT [[id number]]  (Normal) Collected: 07/28/23 1205    Lab Status: Final result Specimen: Blood from Arm, Left Updated: 07/28/23 1222     PTT 32 seconds     CBC and differential [[id number]]  (Abnormal) Collected: 07/28/23 1205    Lab Status: Final result Specimen: Blood from Arm, Left Updated: 07/28/23 1211     WBC 9.80 Thousand/uL      RBC 4.26 Million/uL      Hemoglobin 13.0 g/dL      Hematocrit 39.7 %      MCV 93 fL      MCH 30.5 pg      MCHC 32.7 g/dL      RDW 12.5 %      MPV 10.4 fL      Platelets 005 Thousands/uL      nRBC 0 /100 WBCs      Neutrophils Relative 51 %      Immat GRANS % 0 %      Lymphocytes Relative 38 %      Monocytes Relative 10 %      Eosinophils Relative 1 %      Basophils Relative 0 %      Neutrophils Absolute 5.04 Thousands/µL      Immature Grans Absolute 0.03 Thousand/uL      Lymphocytes Absolute 3.70 Thousands/µL      Monocytes Absolute 0.94 Thousand/µL      Eosinophils Absolute 0.06 Thousand/µL      Basophils Absolute 0.03 Thousands/µL                  CT renal stone study abdomen pelvis without contrast   Final Result by Niels Mcconnell MD (07/28 1318)      No acute findings in the abdomen or pelvis. Specifically, no urinary tract calculi or obstructive uropathy.             Workstation performed: [id number]                    Procedures  Procedures         ED Course                               SBIRT 20yo+    Flowsheet Row Most Recent Value   Initial Alcohol Screen: US AUDIT-C     1. How often do you have a drink containing alcohol? 0 Filed at: 07/28/2023 1033   2. How many drinks containing alcohol do you have on a typical day you are drinking? 0 Filed at: 07/28/2023 1033   3a. Male UNDER 65: How often do you have five or more drinks on one occasion? 0 Filed at: 07/28/2023 1033   3b. FEMALE Any Age, or MALE 65+: How often do you have 4 or more drinks on one occassion? 0 Filed at: 07/28/2023 1033   Audit-C Score 0 Filed at: 07/28/2023 1033   VERO: How many times in the past year have you. .. Used an illegal drug or used a prescription medication for non-medical reasons? Never Filed at: 07/28/2023 1033                    Medical Decision Making  68-year-old male presents emergency room complaining of left-sided flank pain that wraps around to his left mid axillary line. The patient denies any trauma or fever. Patient was concerned that he may have a renal issue so he came to the ER. The patient has been having pain for the past few days. Differential diagnosis at the time of evaluation is musculoskeletal back pain, UTI or renal calculus. Patient was found to have reproducible pain with palpation and movement and urinalysis was nonacute. Urologic CT also negative for significant pathology. Patient was improved with some Toradol in the ER. The patient was told that his symptoms are likely due to musculoskeletal etiology and should follow-up with his family doctor in the next few days. Prescription of Toradol given for discharge. Amount and/or Complexity of Data Reviewed  Labs: ordered. Radiology: ordered. Risk  Prescription drug management.           Disposition  Final diagnoses:   Left flank pain     Time reflects when diagnosis was documented in both MDM as applicable and the Disposition within this note     Time User Action Codes Description Comment    7/28/2023  1:39 PM Arnie Dalal Add [R10.9] Left flank pain       ED Disposition     ED Disposition   Discharge    Condition   Stable    Date/Time   Fri Jul 28, 2023  2:07 PM    Comment   Jose Martin Franklin discharge to home/self care. Follow-up Information     Follow up With Specialties Details Why [address], DO Internal Medicine On 8/2/2023  [address] [place] [address]  [phone]            Discharge Medication List as of 7/28/2023  2:07 PM      START taking these medications    Details   ketorolac (TORADOL) 10 mg tablet Take 1 tablet (10 mg total) by mouth every 6 (six) hours as needed for moderate pain, Starting Fri 7/28/2023, Normal         CONTINUE these medications which have NOT CHANGED    Details   atorvastatin (LIPITOR) 10 mg tablet TAKE ONE TABLET DAILY. .., Normal      divalproex sodium (Depakote) 500 mg EC tablet Take 1 tablet (500 mg total) by mouth every 12 (twelve) hours, Starting Tue 12/6/2022, Normal      ergocalciferol (VITAMIN D2) 50,000 units Take 1 capsule (50,000 Units total) by mouth once a week, Starting Tue 5/30/2023, Normal      levothyroxine (Synthroid) 100 mcg tablet Take 1 tablet (100 mcg total) by mouth daily in the early morning, Starting Mon 6/5/2023, Normal      meloxicam (MOBIC) 7.5 mg tablet Take 1 tablet (7.5 mg total) by mouth daily, Starting Mon 3/13/2023, Normal             No discharge procedures on file.     PDMP Review     None          ED Provider  Electronically Signed by           Trevon Pringle.,   07/28/23 1982

## 2023-07-31 ENCOUNTER — TELEPHONE (OUTPATIENT)
Dept: INTERNAL MEDICINE CLINIC | Facility: CLINIC | Age: 73
End: 2023-07-31

## 2023-08-01 NOTE — TELEPHONE ENCOUNTER
15min er follow up
Pt scheduled for 8/9/23
Detail Level: Simple
Detail Level: Detailed
Quality 110: Preventive Care And Screening: Influenza Immunization: Influenza Immunization not Administered because Patient Refused.

## 2023-08-03 ENCOUNTER — TELEPHONE (OUTPATIENT)
Dept: ADMINISTRATIVE | Facility: OTHER | Age: 73
End: 2023-08-03

## 2023-08-03 NOTE — TELEPHONE ENCOUNTER
08/03/23 1:08 PM    Patient contacted (left message) to bring ACP document to next scheduled pcp visit. Thank you.   Seema Valencia MA  [facility]

## 2023-08-09 ENCOUNTER — OFFICE VISIT (OUTPATIENT)
Dept: INTERNAL MEDICINE CLINIC | Facility: CLINIC | Age: 73
End: 2023-08-09
Payer: MEDICARE

## 2023-08-09 ENCOUNTER — TELEPHONE (OUTPATIENT)
Dept: NEUROLOGY | Facility: CLINIC | Age: 73
End: 2023-08-09

## 2023-08-09 VITALS
WEIGHT: 145.5 LBS | OXYGEN SATURATION: 98 % | HEART RATE: 97 BPM | BODY MASS INDEX: 22.84 KG/M2 | HEIGHT: 67 IN | TEMPERATURE: 98.8 F | SYSTOLIC BLOOD PRESSURE: 140 MMHG | DIASTOLIC BLOOD PRESSURE: 90 MMHG

## 2023-08-09 DIAGNOSIS — M54.41 ACUTE MIDLINE LOW BACK PAIN WITH RIGHT-SIDED SCIATICA: Primary | ICD-10-CM

## 2023-08-09 DIAGNOSIS — M79.602 PARESTHESIA AND PAIN OF BOTH UPPER EXTREMITIES: ICD-10-CM

## 2023-08-09 DIAGNOSIS — M79.601 PARESTHESIA AND PAIN OF BOTH UPPER EXTREMITIES: ICD-10-CM

## 2023-08-09 DIAGNOSIS — R20.2 PARESTHESIA AND PAIN OF BOTH UPPER EXTREMITIES: ICD-10-CM

## 2023-08-09 PROCEDURE — 99213 OFFICE O/P EST LOW 20 MIN: CPT | Performed by: INTERNAL MEDICINE

## 2023-08-09 RX ORDER — MELOXICAM 15 MG/1
15 TABLET ORAL DAILY
Qty: 30 TABLET | Refills: 1 | Status: SHIPPED | OUTPATIENT
Start: 2023-08-09

## 2023-08-09 RX ORDER — METHOCARBAMOL 500 MG/1
500 TABLET, FILM COATED ORAL EVERY 8 HOURS PRN
Qty: 90 TABLET | Refills: 0 | Status: SHIPPED | OUTPATIENT
Start: 2023-08-09

## 2023-08-09 NOTE — TELEPHONE ENCOUNTER
Called patient left message scheduled f/u appointment with Jyotsna Astudillo. Mailed appointment reminder card.

## 2023-08-09 NOTE — PROGRESS NOTES
Assessment/Plan:  Problem List Items Addressed This Visit    None  Visit Diagnoses     Acute midline low back pain with right-sided sciatica    -  Primary    Relevant Medications    methocarbamol (ROBAXIN) 500 mg tablet    Paresthesia and pain of both upper extremities        Relevant Medications    meloxicam (MOBIC) 15 mg tablet    methocarbamol (ROBAXIN) 500 mg tablet           Diagnoses and all orders for this visit:    Acute midline low back pain with right-sided sciatica  -     methocarbamol (ROBAXIN) 500 mg tablet; Take 1 tablet (500 mg total) by mouth every 8 (eight) hours as needed for muscle spasms    Paresthesia and pain of both upper extremities  -     meloxicam (MOBIC) 15 mg tablet; Take 1 tablet (15 mg total) by mouth daily  -     methocarbamol (ROBAXIN) 500 mg tablet; Take 1 tablet (500 mg total) by mouth every 8 (eight) hours as needed for muscle spasms        No problem-specific Assessment & Plan notes found for this encounter. A/P: Stable. Discussed ER w/u. Appears to be MS in nature. Will start warm compresses, mobic, and muscle relaxants. May need an MRI, steroid trial, and PT. RTC one week for f/u. Subjective:      Patient ID: Kajal Dykes is a 68 y.o. male. WM w/o any PMH of back issues, presents with a 10 day h/o LBP with radiation down the right leg to the knee. No trauma or falls. No fever or chills. Seen in the ER and negative w/u. Pain is a constant 5/10. No saddle anesthesia or changes in bowel or bladder habits. Given NSAID's in the ER and some temporary relief. The following portions of the patient's history were reviewed and updated as appropriate:   He has a past medical history of High cholesterol. ,  does not have any pertinent problems on file. ,   has a past surgical history that includes Wrist surgery (Right) and Hernia repair. ,  family history includes Cancer in his brother and mother; Diabetes in his mother; Prostate cancer in his father. ,   reports that he has been smoking cigars. He has never been exposed to tobacco smoke. He has never used smokeless tobacco. He reports current alcohol use. No history on file for drug use.,  has No Known Allergies. .  Current Outpatient Medications   Medication Sig Dispense Refill   • atorvastatin (LIPITOR) 10 mg tablet TAKE ONE TABLET DAILY. .. 90 tablet 0   • divalproex sodium (Depakote) 500 mg EC tablet Take 1 tablet (500 mg total) by mouth every 12 (twelve) hours 180 tablet 3   • ergocalciferol (VITAMIN D2) 50,000 units Take 1 capsule (50,000 Units total) by mouth once a week 12 capsule 3   • ketorolac (TORADOL) 10 mg tablet Take 1 tablet (10 mg total) by mouth every 6 (six) hours as needed for moderate pain 10 tablet 0   • levothyroxine (Synthroid) 100 mcg tablet Take 1 tablet (100 mcg total) by mouth daily in the early morning 90 tablet 0   • meloxicam (MOBIC) 15 mg tablet Take 1 tablet (15 mg total) by mouth daily 30 tablet 1   • methocarbamol (ROBAXIN) 500 mg tablet Take 1 tablet (500 mg total) by mouth every 8 (eight) hours as needed for muscle spasms 90 tablet 0     No current facility-administered medications for this visit. Review of Systems   Constitutional: Positive for activity change. Negative for chills, diaphoresis, fatigue and fever. Respiratory: Negative for cough, chest tightness, shortness of breath and wheezing. Cardiovascular: Negative for chest pain, palpitations and leg swelling. Gastrointestinal: Negative for abdominal pain, constipation, diarrhea, nausea and vomiting. Genitourinary: Negative for difficulty urinating, dysuria and frequency. Musculoskeletal: Positive for back pain and gait problem. Negative for arthralgias and myalgias. Neurological: Positive for numbness. Negative for syncope, weakness, light-headedness and headaches. Psychiatric/Behavioral: Negative for confusion. The patient is not nervous/anxious.         PHQ-2/9 Depression Screening          Objective:  Vitals:    08/09/23 1141   BP: 140/90   Pulse: 97   Temp: 98.8 °F (37.1 °C)   SpO2: 98%   Weight: 66 kg (145 lb 8 oz)   Height: 5' 7" (1.702 m)     Body mass index is 22.79 kg/m². Physical Exam  Vitals and nursing note reviewed. Constitutional:       General: He is not in acute distress. Appearance: Normal appearance. He is not ill-appearing. HENT:      Head: Normocephalic and atraumatic. Mouth/Throat:      Mouth: Mucous membranes are moist.   Eyes:      Extraocular Movements: Extraocular movements intact. Conjunctiva/sclera: Conjunctivae normal.      Pupils: Pupils are equal, round, and reactive to light. Musculoskeletal:         General: Tenderness present. Comments: LS Spine w/o gross deformities, increase temp, erythema, swelling, or lesions. Tenderness midline L3-S1. ROM decreased all planes 20%. . Spasms slight. LE strength 5/5 with tone/ROM WNL. DTR 2/4. No gait abnormalities(toe/heel walking). Neurological:      General: No focal deficit present. Mental Status: He is alert and oriented to person, place, and time. Mental status is at baseline. Psychiatric:         Mood and Affect: Mood normal.         Behavior: Behavior normal.         Thought Content:  Thought content normal.         Judgment: Judgment normal.

## 2023-08-09 NOTE — PATIENT INSTRUCTIONS
Methocarbamol (By mouth)   Methocarbamol (meth-oh-STELLA-ba-mol)  Treats muscle pain and spasms. Brand Name(s): Robaxin   There may be other brand names for this medicine. When This Medicine Should Not Be Used: This medicine is not right for everyone. Do not use it if you had an allergic reaction to methocarbamol. How to Use This Medicine:   Tablet  Take your medicine as directed. Your dose may need to be changed several times to find what works best for you. Missed dose: Take a dose as soon as you remember. If it is almost time for your next dose, wait until then and take a regular dose. Do not take extra medicine to make up for a missed dose. Store the medicine in a closed container at room temperature, away from heat, moisture, and direct light. Drugs and Foods to Avoid:   Ask your doctor or pharmacist before using any other medicine, including over-the-counter medicines, vitamins, and herbal products. Some medicines can affect how methocarbamol works. Tell your doctor if you are using pyridostigmine. Do not drink alcohol while you are using this medicine. Tell your doctor if you use anything else that makes you sleepy. Some examples are allergy medicine, narcotic pain medicine, and alcohol. Warnings While Using This Medicine:   Tell your doctor if you are pregnant or breastfeeding, or if you have kidney disease, liver disease, or myasthenia gravis. This medicine may make you dizzy or drowsy. Do not drive or do anything that could be dangerous until you know how this medicine affects you. Tell any doctor or dentist who treats you that you are using this medicine. This medicine may affect certain medical test results. Your doctor will check your progress and the effects of this medicine at regular visits. Keep all appointments. Keep all medicine out of the reach of children. Never share your medicine with anyone.   Possible Side Effects While Using This Medicine:   Call your doctor right away if you notice any of these side effects: Allergic reaction: Itching or hives, swelling in your face or hands, swelling or tingling in your mouth or throat, chest tightness, trouble breathing  Blurred vision, redness, pain, or swelling of the eyes  Dark urine or pale stools, nausea, vomiting, loss of appetite, stomach pain, yellow skin or eyes  Fever  Lightheadedness, dizziness, fainting  Seizures  Slow heartbeat  Unusual bleeding, bruising, or weakness  If you notice these less serious side effects, talk with your doctor:   Confusion, trouble sleeping  Headache  Warmth or redness in your face, neck, arms, or upper chest  If you notice other side effects that you think are caused by this medicine, tell your doctor. Call your doctor for medical advice about side effects. You may report side effects to FDA at 2-233-FDA-1196  © Copyright [employer] 2022 Information is for End User's use only and may not be sold, redistributed or otherwise used for commercial purposes. The above information is an  only. It is not intended as medical advice for individual conditions or treatments. Talk to your doctor, nurse or pharmacist before following any medical regimen to see if it is safe and effective for you.

## 2023-08-11 ENCOUNTER — RA CDI HCC (OUTPATIENT)
Dept: OTHER | Facility: HOSPITAL | Age: 73
End: 2023-08-11

## 2023-08-21 ENCOUNTER — OFFICE VISIT (OUTPATIENT)
Dept: INTERNAL MEDICINE CLINIC | Facility: CLINIC | Age: 73
End: 2023-08-21
Payer: MEDICARE

## 2023-08-21 VITALS
DIASTOLIC BLOOD PRESSURE: 72 MMHG | HEIGHT: 67 IN | BODY MASS INDEX: 23.7 KG/M2 | TEMPERATURE: 100 F | HEART RATE: 71 BPM | WEIGHT: 151 LBS | SYSTOLIC BLOOD PRESSURE: 124 MMHG | OXYGEN SATURATION: 98 %

## 2023-08-21 DIAGNOSIS — M79.602 PARESTHESIA AND PAIN OF BOTH UPPER EXTREMITIES: ICD-10-CM

## 2023-08-21 DIAGNOSIS — M79.601 PARESTHESIA AND PAIN OF BOTH UPPER EXTREMITIES: ICD-10-CM

## 2023-08-21 DIAGNOSIS — R20.2 PARESTHESIA AND PAIN OF BOTH UPPER EXTREMITIES: ICD-10-CM

## 2023-08-21 DIAGNOSIS — M54.41 ACUTE MIDLINE LOW BACK PAIN WITH RIGHT-SIDED SCIATICA: Primary | ICD-10-CM

## 2023-08-21 PROCEDURE — 99213 OFFICE O/P EST LOW 20 MIN: CPT | Performed by: INTERNAL MEDICINE

## 2023-08-21 NOTE — PROGRESS NOTES
Assessment/Plan:  Problem List Items Addressed This Visit    None  Visit Diagnoses     Acute midline low back pain with right-sided sciatica    -  Primary    Paresthesia and pain of both upper extremities               Diagnoses and all orders for this visit:    Acute midline low back pain with right-sided sciatica    Paresthesia and pain of both upper extremities        No problem-specific Assessment & Plan notes found for this encounter. A/P: Doing better and tolerating the meds. Advance activity slowly. Continue with ice/heat. Continue meds. Discussed PT and defers. RTC as scheduled. Subjective:      Patient ID: Renard Scheuermann is a 68 y.o. male. WM RTC for f/u LBP with sciatica after starting mobic and muscle relaxant. DOing much better and reports 90% improvement and pain now rated a 1/10. Tolerating the meds and no new issues. The following portions of the patient's history were reviewed and updated as appropriate:   He has a past medical history of High cholesterol. ,  does not have any pertinent problems on file. ,   has a past surgical history that includes Wrist surgery (Right) and Hernia repair. ,  family history includes Cancer in his brother and mother; Diabetes in his mother; Prostate cancer in his father. ,   reports that he has been smoking cigars. He has never been exposed to tobacco smoke. He has never used smokeless tobacco. He reports current alcohol use. No history on file for drug use.,  has No Known Allergies. .  Current Outpatient Medications   Medication Sig Dispense Refill   • atorvastatin (LIPITOR) 10 mg tablet TAKE ONE TABLET DAILY. .. 90 tablet 0   • divalproex sodium (Depakote) 500 mg EC tablet Take 1 tablet (500 mg total) by mouth every 12 (twelve) hours 180 tablet 3   • ergocalciferol (VITAMIN D2) 50,000 units Take 1 capsule (50,000 Units total) by mouth once a week 12 capsule 3   • ketorolac (TORADOL) 10 mg tablet Take 1 tablet (10 mg total) by mouth every 6 (six) hours as needed for moderate pain 10 tablet 0   • levothyroxine (Synthroid) 100 mcg tablet Take 1 tablet (100 mcg total) by mouth daily in the early morning 90 tablet 0   • meloxicam (MOBIC) 15 mg tablet Take 1 tablet (15 mg total) by mouth daily 30 tablet 1   • methocarbamol (ROBAXIN) 500 mg tablet Take 1 tablet (500 mg total) by mouth every 8 (eight) hours as needed for muscle spasms 90 tablet 0     No current facility-administered medications for this visit. Review of Systems   Constitutional: Positive for activity change. Negative for chills, diaphoresis, fatigue and fever. Respiratory: Negative for cough, chest tightness, shortness of breath and wheezing. Cardiovascular: Negative for chest pain, palpitations and leg swelling. Gastrointestinal: Negative for abdominal pain, constipation, diarrhea, nausea and vomiting. Genitourinary: Negative for difficulty urinating, dysuria and frequency. Musculoskeletal: Positive for back pain. Negative for arthralgias, gait problem and myalgias. Neurological: Negative for light-headedness and headaches. Psychiatric/Behavioral: Negative for confusion. The patient is not nervous/anxious. PHQ-2/9 Depression Screening          Objective:  Vitals:    08/21/23 1532   BP: 124/72   Pulse: 71   Temp: 100 °F (37.8 °C)   SpO2: 98%   Weight: 68.5 kg (151 lb)   Height: 5' 7" (1.702 m)     Body mass index is 23.65 kg/m². Physical Exam  Vitals and nursing note reviewed. Constitutional:       General: He is not in acute distress. Appearance: Normal appearance. He is not ill-appearing. HENT:      Head: Normocephalic and atraumatic. Mouth/Throat:      Mouth: Mucous membranes are moist.   Eyes:      Extraocular Movements: Extraocular movements intact. Conjunctiva/sclera: Conjunctivae normal.      Pupils: Pupils are equal, round, and reactive to light. Musculoskeletal:         General: Tenderness present. No swelling or deformity. Normal range of motion. Comments: LS Spine w/o gross deformities, increase temp, erythema, swelling, or lesions. Tenderness none. ROM wnl. Spasms none. LE strength 5/5 with tone/ROM WNL. DTR 2/4. Yaima Rudd No gait abnormalities(toe/heel walking). Neurological:      General: No focal deficit present. Mental Status: He is alert and oriented to person, place, and time. Mental status is at baseline. Psychiatric:         Mood and Affect: Mood normal.         Behavior: Behavior normal.         Thought Content:  Thought content normal.         Judgment: Judgment normal.

## 2023-09-07 DIAGNOSIS — E78.3 MIXED HYPERGLYCERIDEMIA: ICD-10-CM

## 2023-09-07 RX ORDER — ATORVASTATIN CALCIUM 10 MG/1
TABLET, FILM COATED ORAL
Qty: 90 TABLET | Refills: 0 | Status: SHIPPED | OUTPATIENT
Start: 2023-09-07

## 2023-09-25 ENCOUNTER — TELEPHONE (OUTPATIENT)
Dept: NEUROLOGY | Facility: CLINIC | Age: 73
End: 2023-09-25

## 2023-09-25 NOTE — TELEPHONE ENCOUNTER
I called the patient and left a voicemail that I needed to reschedule his 11/7 appt with Dr. Guillermo Norwood. Carole back line given.

## 2023-09-27 NOTE — TELEPHONE ENCOUNTER
I called the patient and left another message that I was calling to reschedule his 11/7 appt with Dr. Eliezer Kearney to 11/9 at the same time if agreeable. Ange back line given.

## 2023-10-03 NOTE — TELEPHONE ENCOUNTER
I called and spoke with  Stephen; appt change confirmed from 11/7 to 11/9 at 4:30 pm with Dr. Dawn Noyola. Address reconfirmed with Mrs. Mitchell.

## 2023-11-09 ENCOUNTER — OFFICE VISIT (OUTPATIENT)
Dept: NEUROLOGY | Facility: CLINIC | Age: 73
End: 2023-11-09
Payer: MEDICARE

## 2023-11-09 VITALS
DIASTOLIC BLOOD PRESSURE: 92 MMHG | SYSTOLIC BLOOD PRESSURE: 144 MMHG | HEIGHT: 66 IN | BODY MASS INDEX: 24.27 KG/M2 | WEIGHT: 151 LBS | HEART RATE: 74 BPM

## 2023-11-09 DIAGNOSIS — R56.9 SEIZURE (HCC): Primary | ICD-10-CM

## 2023-11-09 PROCEDURE — 99214 OFFICE O/P EST MOD 30 MIN: CPT | Performed by: PSYCHIATRY & NEUROLOGY

## 2023-11-09 RX ORDER — DIVALPROEX SODIUM 500 MG/1
500 TABLET, DELAYED RELEASE ORAL EVERY 12 HOURS SCHEDULED
Qty: 60 TABLET | Refills: 2 | Status: SHIPPED | OUTPATIENT
Start: 2023-11-09 | End: 2023-11-13 | Stop reason: SDUPTHER

## 2023-11-09 NOTE — PROGRESS NOTES
Patient ID: Caesar Rivas is a 68 y.o. male. Assessment/Plan:    Seizure [facility])  79yo M w/ a PMH of hyperbilirubin, HLD, chronic alcohol use and focal seizures coming in for follow-up for his seizures. Since being on the Depakote 500 mg twice daily. He has no side effects on the medications. Last seizure was 12/2022 and at [facility] 04/11/2023 was seizure free for 6 months as confirmed by wife and [place] to reinstate license was sent in. Since then, patient has had no seizures and has had his LFTs and VPA levels checked that were WNL. No side effects on depakote. Drinks about 1 beer/month. He does complain that that depakote is $60/month so was requesting if there is a cheaper alternative. Semiology: 10-15 seconds of R arm circular movement and mouth movements where patient is unaware of the episode. W/U:    Lab Results   Component Value Date    SODIUM 137 07/28/2023    K 4.1 07/28/2023     07/28/2023    CO2 28 07/28/2023    AGAP 5 07/28/2023    BUN 15 07/28/2023    CREATININE 0.77 07/28/2023    GLUC 94 07/28/2023    GLUF 99 03/13/2023    CALCIUM 9.0 07/28/2023    AST 18 07/28/2023    ALT 18 07/28/2023    ALKPHOS 44 07/28/2023    TP 6.2 (L) 07/28/2023    TBILI 0.79 07/28/2023    EGFR 90 07/28/2023 05/31/2023 VPA: 80 WNL     ImpressionL focal imparied aware seizures      Plan  Staffed w/ Dr. Edy Jansen   - goodrx coupon recommended for patient in regards to getting cheaper price    - patient to give office a call if still too pricy  - continue depakote 500mg BID (can use goodrx for cheaper prices)   - 6 month f/u          Problem List Items Addressed This Visit          Other    Seizure ([address]) - Primary     79yo M w/ a PMH of hyperbilirubin, HLD, chronic alcohol use and focal seizures coming in for follow-up for his seizures. Since being on the Depakote 500 mg twice daily. He has no side effects on the medications.    Last seizure was 12/2022 and at [facility] 04/11/2023 was seizure free for 6 months as confirmed by wife and [place] to reinstate license was sent in. Since then, patient has had no seizures and has had his LFTs and VPA levels checked that were WNL. No side effects on depakote. Drinks about 1 beer/month. He does complain that that depakote is $60/month so was requesting if there is a cheaper alternative. Semiology: 10-15 seconds of R arm circular movement and mouth movements where patient is unaware of the episode. W/U:    Lab Results   Component Value Date    SODIUM 137 07/28/2023    K 4.1 07/28/2023     07/28/2023    CO2 28 07/28/2023    AGAP 5 07/28/2023    BUN 15 07/28/2023    CREATININE 0.77 07/28/2023    GLUC 94 07/28/2023    GLUF 99 03/13/2023    CALCIUM 9.0 07/28/2023    AST 18 07/28/2023    ALT 18 07/28/2023    ALKPHOS 44 07/28/2023    TP 6.2 (L) 07/28/2023    TBILI 0.79 07/28/2023    EGFR 90 07/28/2023 05/31/2023 VPA: 80 WNL     ImpressionL focal imparied aware seizures      Plan  Staffed w/ Dr. Andra Arriola   - goodrx coupon recommended for patient in regards to getting cheaper price    - patient to give office a call if still too pricy  - continue depakote 500mg BID (can use goodrx for cheaper prices)   - 6 month f/u          Relevant Medications    divalproex sodium (DEPAKOTE) 500 mg DR tablet            Subjective:    HPI      77yo M w/ a PMH of hyperbilirubin, HLD, chronic alcohol use and focal seizures coming in for follow-up for his seizures. Since being on the Depakote 500 mg twice daily. He has no side effects on the medications. He is driving now w/o issue. His last seizure was 12/02/2022 visit where he was mainly mumbling and not doing much movements. This lasted for less than a minute. During the last office visit, gave advised to stop drinking alcohol as this can lower seizure/hold and since then patient has been very compliant and stated that he has no longer been drinking alcohol. He drinks about 1 beer/month.  In addition, patient has been very compliant with his medications. Lab Results   Component Value Date    SODIUM 137 07/28/2023    K 4.1 07/28/2023     07/28/2023    CO2 28 07/28/2023    AGAP 5 07/28/2023    BUN 15 07/28/2023    CREATININE 0.77 07/28/2023    GLUC 94 07/28/2023    GLUF 99 03/13/2023    CALCIUM 9.0 07/28/2023    AST 18 07/28/2023    ALT 18 07/28/2023    ALKPHOS 44 07/28/2023    TP 6.2 (L) 07/28/2023    TBILI 0.79 07/28/2023    EGFR 90 07/28/2023 05/31/2023 VPA: 80 WNL         The following portions of the patient's history were reviewed and updated as appropriate: He  has a past medical history of High cholesterol and Seizure ([address]). He   Patient Active Problem List    Diagnosis Date Noted    Vitamin D deficiency 03/29/2023    Prediabetes 03/29/2023    Abnormal thyroid blood test 03/29/2023    Thrombocytosis 03/29/2023    Platelets decreased ([address]) 03/29/2023    Seizure ([address]) 08/09/2022    Recurrent episodes of unresponsiveness     Mixed hyperlipidemia 04/29/2022    Hyperbilirubinemia 04/29/2022    Former smoker 04/29/2022     He  has a past surgical history that includes Wrist surgery (Right) and Hernia repair. His family history includes Cancer in his brother and mother; Diabetes in his mother; Prostate cancer in his father. He  reports that he has been smoking cigars. He has never been exposed to tobacco smoke. He has never used smokeless tobacco. He reports current alcohol use. He reports that he does not use drugs. Current Outpatient Medications   Medication Sig Dispense Refill    atorvastatin (LIPITOR) 10 mg tablet TAKE ONE TABLET DAILY. .. 90 tablet 0    divalproex sodium (DEPAKOTE) 500 mg DR tablet Take 1 tablet (500 mg total) by mouth every 12 (twelve) hours 60 tablet 2    ergocalciferol (VITAMIN D2) 50,000 units Take 1 capsule (50,000 Units total) by mouth once a week 12 capsule 3    ketorolac (TORADOL) 10 mg tablet Take 1 tablet (10 mg total) by mouth every 6 (six) hours as needed for moderate pain (Patient not taking: Reported on 11/9/2023) 10 tablet 0    levothyroxine (Synthroid) 100 mcg tablet Take 1 tablet (100 mcg total) by mouth daily in the early morning (Patient not taking: Reported on 11/9/2023) 90 tablet 0    meloxicam (MOBIC) 15 mg tablet Take 1 tablet (15 mg total) by mouth daily (Patient not taking: Reported on 11/9/2023) 30 tablet 1    methocarbamol (ROBAXIN) 500 mg tablet Take 1 tablet (500 mg total) by mouth every 8 (eight) hours as needed for muscle spasms (Patient not taking: Reported on 11/9/2023) 90 tablet 0     No current facility-administered medications for this visit. Current Outpatient Medications on File Prior to Visit   Medication Sig    atorvastatin (LIPITOR) 10 mg tablet TAKE ONE TABLET DAILY. ..    ergocalciferol (VITAMIN D2) 50,000 units Take 1 capsule (50,000 Units total) by mouth once a week    [DISCONTINUED] divalproex sodium (Depakote) 500 mg EC tablet Take 1 tablet (500 mg total) by mouth every 12 (twelve) hours    ketorolac (TORADOL) 10 mg tablet Take 1 tablet (10 mg total) by mouth every 6 (six) hours as needed for moderate pain (Patient not taking: Reported on 11/9/2023)    levothyroxine (Synthroid) 100 mcg tablet Take 1 tablet (100 mcg total) by mouth daily in the early morning (Patient not taking: Reported on 11/9/2023)    meloxicam (MOBIC) 15 mg tablet Take 1 tablet (15 mg total) by mouth daily (Patient not taking: Reported on 11/9/2023)    methocarbamol (ROBAXIN) 500 mg tablet Take 1 tablet (500 mg total) by mouth every 8 (eight) hours as needed for muscle spasms (Patient not taking: Reported on 11/9/2023)     No current facility-administered medications on file prior to visit. He has No Known Allergies. .         Objective:    Blood pressure 144/92, pulse 74, height 5' 6" (1.676 m), weight 68.5 kg (151 lb). Physical Exam  Eyes:      General: Lids are normal.      Extraocular Movements: Extraocular movements intact.       Pupils: Pupils are equal, round, and reactive to light. Neurological:      Motor: Motor strength is normal.     Deep Tendon Reflexes:      Reflex Scores:       Tricep reflexes are 1+ on the right side and 1+ on the left side. Bicep reflexes are 1+ on the right side and 1+ on the left side. Brachioradialis reflexes are 1+ on the right side and 1+ on the left side. Patellar reflexes are 1+ on the right side and 1+ on the left side. Achilles reflexes are 1+ on the right side and 1+ on the left side. GENERAL EXAM:    CONSTITUTIONAL: Well developed, well nourished, well groomed. No dysmorphic features. Eyes:  PERRLA, EOM normal      Neck:  Normal ROM, neck supple. HEENT:  Normocephalic atraumatic. No meningismus. Oropharynx is clear and moist. No oral mucosal lesions. Chest:  Respirations regular and unlabored. Cardiovascular:  Distal extremities warm without palpable edema or tenderness, no observed significant swelling. Musculoskeletal:  Full range of motion. Skin:  warm and dry   Psychiatric:  Normal behavior and appropriate affect          Neurological Exam  Mental Status  Awake, alert and oriented to person, place and time. Cranial Nerves  CN II: Visual acuity is normal. Visual fields full to confrontation. CN III, IV, VI: Extraocular movements intact bilaterally. Normal lids and orbits bilaterally. Pupils equal round and reactive to light bilaterally. CN V: Facial sensation is normal.  CN VII: Full and symmetric facial movement. CN VIII: Hearing is normal.  CN IX, X: Palate elevates symmetrically. Normal gag reflex. CN XI: Shoulder shrug strength is normal.  CN XII: Tongue midline without atrophy or fasciculations. Motor   Strength is 5/5 throughout all four extremities. Sensory  Sensation is intact to light touch, pinprick, vibration and proprioception in all four extremities.     Reflexes                                            Right                      Left  Brachioradialis 1+                         1+  Biceps                                 1+                         1+  Triceps                                1+                         1+  Patellar                                1+                         1+  Achilles                                1+                         1+  Right Plantar: downgoing  Left Plantar: downgoing    Coordination  Right: Finger-to-nose normal. Rapid alternating movement normal. Heel-to-shin normal.Left: Finger-to-nose normal. Rapid alternating movement normal. Heel-to-shin normal.    Gait  Casual gait is normal including stance, stride, and arm swing. ROS:    Review of Systems   Constitutional:  Negative for chills and fever. HENT:  Negative for ear pain and sore throat. Eyes:  Negative for pain and visual disturbance. Respiratory:  Negative for cough and shortness of breath. Cardiovascular:  Negative for chest pain and palpitations. Gastrointestinal:  Negative for abdominal pain and vomiting. Genitourinary:  Negative for dysuria and hematuria. Musculoskeletal:  Negative for arthralgias and back pain. Skin:  Negative for color change and rash. Neurological:  Negative for seizures and syncope. All other systems reviewed and are negative.

## 2023-11-09 NOTE — ASSESSMENT & PLAN NOTE
77yo M w/ a PMH of hyperbilirubin, HLD, chronic alcohol use and focal seizures coming in for follow-up for his seizures. Since being on the Depakote 500 mg twice daily. He has no side effects on the medications. Last seizure was 12/2022 and at [facility] 04/11/2023 was seizure free for 6 months as confirmed by wife and [place] to reinstate license was sent in. Since then, patient has had no seizures and has had his LFTs and VPA levels checked that were WNL. No side effects on depakote. Drinks about 1 beer/month. He does complain that that depakote is $60/month so was requesting if there is a cheaper alternative. Semiology: 10-15 seconds of R arm circular movement and mouth movements where patient is unaware of the episode.       W/U:    Lab Results   Component Value Date    SODIUM 137 07/28/2023    K 4.1 07/28/2023     07/28/2023    CO2 28 07/28/2023    AGAP 5 07/28/2023    BUN 15 07/28/2023    CREATININE 0.77 07/28/2023    GLUC 94 07/28/2023    GLUF 99 03/13/2023    CALCIUM 9.0 07/28/2023    AST 18 07/28/2023    ALT 18 07/28/2023    ALKPHOS 44 07/28/2023    TP 6.2 (L) 07/28/2023    TBILI 0.79 07/28/2023    EGFR 90 07/28/2023 05/31/2023 VPA: 80 WNL     ImpressionL focal imparied aware seizures      Plan  Staffed w/ Dr. Jessa River   - goodrx coupon recommended for patient in regards to getting cheaper price    - patient to give office a call if still too pricy  - continue depakote 500mg BID (can use goodrx for cheaper prices)   - 6 month f/u

## 2023-11-13 DIAGNOSIS — R56.9 SEIZURE (HCC): ICD-10-CM

## 2023-11-13 NOTE — TELEPHONE ENCOUNTER
Patient's son-in-law called to have divalproex sodium (DEPAKOTE) 500 mg DR tablet resent to [facility] [place]. This was sent to [address] [place] [address]  on 11/9/23.

## 2023-11-14 RX ORDER — DIVALPROEX SODIUM 500 MG/1
500 TABLET, DELAYED RELEASE ORAL EVERY 12 HOURS SCHEDULED
Qty: 180 TABLET | Refills: 3 | Status: SHIPPED | OUTPATIENT
Start: 2023-11-14

## 2023-11-29 ENCOUNTER — TELEPHONE (OUTPATIENT)
Dept: ADMINISTRATIVE | Facility: OTHER | Age: 73
End: 2023-11-29

## 2023-11-29 NOTE — TELEPHONE ENCOUNTER
11/29/23 2:13 PM    Patient contacted (no answer/ line busy) to bring Advance Directive, POLST, or Living Will document to next scheduled pcp visit. Thank you.   Stacia Paul [facility]

## 2023-11-30 ENCOUNTER — APPOINTMENT (OUTPATIENT)
Dept: LAB | Facility: CLINIC | Age: 73
End: 2023-11-30
Payer: MEDICARE

## 2023-11-30 ENCOUNTER — OFFICE VISIT (OUTPATIENT)
Dept: INTERNAL MEDICINE CLINIC | Facility: CLINIC | Age: 73
End: 2023-11-30
Payer: MEDICARE

## 2023-11-30 VITALS
HEIGHT: 66 IN | OXYGEN SATURATION: 99 % | WEIGHT: 153 LBS | DIASTOLIC BLOOD PRESSURE: 74 MMHG | TEMPERATURE: 97.8 F | HEART RATE: 84 BPM | SYSTOLIC BLOOD PRESSURE: 134 MMHG | BODY MASS INDEX: 24.59 KG/M2

## 2023-11-30 DIAGNOSIS — R73.03 PREDIABETES: ICD-10-CM

## 2023-11-30 DIAGNOSIS — M79.602 PARESTHESIA AND PAIN OF BOTH UPPER EXTREMITIES: ICD-10-CM

## 2023-11-30 DIAGNOSIS — E55.9 VITAMIN D DEFICIENCY: ICD-10-CM

## 2023-11-30 DIAGNOSIS — R79.89 ABNORMAL THYROID BLOOD TEST: ICD-10-CM

## 2023-11-30 DIAGNOSIS — M79.601 PARESTHESIA AND PAIN OF BOTH UPPER EXTREMITIES: ICD-10-CM

## 2023-11-30 DIAGNOSIS — R20.2 PARESTHESIA AND PAIN OF BOTH UPPER EXTREMITIES: ICD-10-CM

## 2023-11-30 DIAGNOSIS — E78.2 MIXED HYPERLIPIDEMIA: ICD-10-CM

## 2023-11-30 DIAGNOSIS — M54.41 ACUTE MIDLINE LOW BACK PAIN WITH RIGHT-SIDED SCIATICA: ICD-10-CM

## 2023-11-30 DIAGNOSIS — E78.3 MIXED HYPERGLYCERIDEMIA: ICD-10-CM

## 2023-11-30 DIAGNOSIS — R56.9 SEIZURE (HCC): ICD-10-CM

## 2023-11-30 DIAGNOSIS — R15.9 INCONTINENCE OF FECES WITH FECAL URGENCY: ICD-10-CM

## 2023-11-30 DIAGNOSIS — R56.9 SEIZURE (HCC): Primary | ICD-10-CM

## 2023-11-30 DIAGNOSIS — D75.839 THROMBOCYTOSIS: ICD-10-CM

## 2023-11-30 DIAGNOSIS — R40.4 RECURRENT EPISODES OF UNRESPONSIVENESS: ICD-10-CM

## 2023-11-30 DIAGNOSIS — E07.89 OTHER SPECIFIED DISORDERS OF THYROID: ICD-10-CM

## 2023-11-30 DIAGNOSIS — E03.9 ACQUIRED HYPOTHYROIDISM: ICD-10-CM

## 2023-11-30 DIAGNOSIS — R15.2 INCONTINENCE OF FECES WITH FECAL URGENCY: ICD-10-CM

## 2023-11-30 LAB
25(OH)D3 SERPL-MCNC: 42 NG/ML (ref 30–100)
EST. AVERAGE GLUCOSE BLD GHB EST-MCNC: 120 MG/DL
HBA1C MFR BLD: 5.8 %
TSH SERPL DL<=0.05 MIU/L-ACNC: 3.95 UIU/ML (ref 0.45–4.5)
VALPROATE SERPL-MCNC: 106 UG/ML (ref 50–100)

## 2023-11-30 PROCEDURE — 99214 OFFICE O/P EST MOD 30 MIN: CPT | Performed by: INTERNAL MEDICINE

## 2023-11-30 PROCEDURE — 36415 COLL VENOUS BLD VENIPUNCTURE: CPT

## 2023-11-30 PROCEDURE — 80164 ASSAY DIPROPYLACETIC ACD TOT: CPT

## 2023-11-30 PROCEDURE — 84443 ASSAY THYROID STIM HORMONE: CPT

## 2023-11-30 PROCEDURE — 83036 HEMOGLOBIN GLYCOSYLATED A1C: CPT

## 2023-11-30 PROCEDURE — 82306 VITAMIN D 25 HYDROXY: CPT

## 2023-11-30 RX ORDER — MELOXICAM 15 MG/1
15 TABLET ORAL DAILY
Qty: 30 TABLET | Refills: 1 | Status: SHIPPED | OUTPATIENT
Start: 2023-11-30

## 2023-11-30 RX ORDER — METHOCARBAMOL 500 MG/1
500 TABLET, FILM COATED ORAL EVERY 8 HOURS PRN
Qty: 90 TABLET | Refills: 0 | Status: SHIPPED | OUTPATIENT
Start: 2023-11-30

## 2023-11-30 RX ORDER — ERGOCALCIFEROL 1.25 MG/1
50000 CAPSULE ORAL WEEKLY
Qty: 12 CAPSULE | Refills: 3 | Status: SHIPPED | OUTPATIENT
Start: 2023-11-30

## 2023-11-30 RX ORDER — LEVOTHYROXINE SODIUM 0.1 MG/1
100 TABLET ORAL
Qty: 90 TABLET | Refills: 0 | Status: SHIPPED | OUTPATIENT
Start: 2023-11-30

## 2023-11-30 RX ORDER — DIVALPROEX SODIUM 500 MG/1
500 TABLET, DELAYED RELEASE ORAL EVERY 12 HOURS SCHEDULED
Qty: 180 TABLET | Refills: 3 | Status: SHIPPED | OUTPATIENT
Start: 2023-11-30

## 2023-11-30 RX ORDER — ATORVASTATIN CALCIUM 10 MG/1
10 TABLET, FILM COATED ORAL DAILY
Qty: 90 TABLET | Refills: 1 | Status: SHIPPED | OUTPATIENT
Start: 2023-11-30

## 2023-11-30 NOTE — PROGRESS NOTES
Depression Screening and Follow-up Plan: Patient was screened for depression during today's encounter. They screened negative with a PHQ-2 score of 0. Assessment/Plan:  Problem List Items Addressed This Visit        Hematopoietic and Hemostatic    Thrombocytosis       Other    Vitamin D deficiency    Relevant Medications    ergocalciferol (VITAMIN D2) 50,000 units    Other Relevant Orders    Vitamin D 25 hydroxy    Seizure (HCC) - Primary    Relevant Medications    divalproex sodium (DEPAKOTE) 500 mg DR tablet    Other Relevant Orders    Valproic acid level, total    Recurrent episodes of unresponsiveness    Prediabetes    Relevant Orders    Hemoglobin A1C    Mixed hyperlipidemia    Relevant Medications    atorvastatin (LIPITOR) 10 mg tablet   Other Visit Diagnoses     Mixed hyperglyceridemia        Relevant Medications    atorvastatin (LIPITOR) 10 mg tablet    Acquired hypothyroidism        Relevant Medications    levothyroxine (Synthroid) 100 mcg tablet    Paresthesia and pain of both upper extremities        Relevant Medications    meloxicam (MOBIC) 15 mg tablet    methocarbamol (ROBAXIN) 500 mg tablet    Acute midline low back pain with right-sided sciatica        Relevant Medications    methocarbamol (ROBAXIN) 500 mg tablet    Incontinence of feces with fecal urgency        Relevant Orders    Ambulatory referral to Gastroenterology           Diagnoses and all orders for this visit:    Seizure ([address])  -     Valproic acid level, total; Future  -     divalproex sodium (DEPAKOTE) 500 mg DR tablet; Take 1 tablet (500 mg total) by mouth every 12 (twelve) hours    Mixed hyperglyceridemia  -     atorvastatin (LIPITOR) 10 mg tablet; Take 1 tablet (10 mg total) by mouth daily    Vitamin D deficiency  -     Vitamin D 25 hydroxy; Future  -     ergocalciferol (VITAMIN D2) 50,000 units;  Take 1 capsule (50,000 Units total) by mouth once a week    Acquired hypothyroidism  -     levothyroxine (Synthroid) 100 mcg tablet; Take 1 tablet (100 mcg total) by mouth daily in the early morning    Paresthesia and pain of both upper extremities  -     meloxicam (MOBIC) 15 mg tablet; Take 1 tablet (15 mg total) by mouth daily  -     methocarbamol (ROBAXIN) 500 mg tablet; Take 1 tablet (500 mg total) by mouth every 8 (eight) hours as needed for muscle spasms    Acute midline low back pain with right-sided sciatica  -     methocarbamol (ROBAXIN) 500 mg tablet; Take 1 tablet (500 mg total) by mouth every 8 (eight) hours as needed for muscle spasms    Mixed hyperlipidemia    Prediabetes  -     Hemoglobin A1C; Future    Recurrent episodes of unresponsiveness    Thrombocytosis    Incontinence of feces with fecal urgency  -     Ambulatory referral to Gastroenterology; Future        No problem-specific Assessment & Plan notes found for this encounter. A/P: Doing ok and will check labs. Will refer to GI for the incontinence. Deferred PATIENCE. Discussed vaccines and defers the flu vaccine. Continue current treatment and RTC six months for routine. Subjective:      Patient ID: Vi Graham is a 68 y.o. male. WM RTC for f/u Sz, HLD, etc. Doing ok and no new issues, but ROS is positive for stool incontinence. No diarrhea or constipation. Some urgency. Reports brother just dx with prostate ca. . Remains active w/o difficulty and no falls. No sz activity to report. Due for labs and vaccines. The following portions of the patient's history were reviewed and updated as appropriate:   He has a past medical history of High cholesterol and Seizure ([address]). ,  does not have any pertinent problems on file. ,   has a past surgical history that includes Wrist surgery (Right) and Hernia repair. ,  family history includes Cancer in his brother and mother; Diabetes in his mother; Prostate cancer in his brother and father. ,   reports that he has been smoking cigars. He has never been exposed to tobacco smoke.  He has never used smokeless tobacco. He reports current alcohol use. He reports that he does not use drugs. ,  has No Known Allergies. .  Current Outpatient Medications   Medication Sig Dispense Refill   • atorvastatin (LIPITOR) 10 mg tablet Take 1 tablet (10 mg total) by mouth daily 90 tablet 1   • divalproex sodium (DEPAKOTE) 500 mg DR tablet Take 1 tablet (500 mg total) by mouth every 12 (twelve) hours 180 tablet 3   • ergocalciferol (VITAMIN D2) 50,000 units Take 1 capsule (50,000 Units total) by mouth once a week 12 capsule 3   • levothyroxine (Synthroid) 100 mcg tablet Take 1 tablet (100 mcg total) by mouth daily in the early morning 90 tablet 0   • meloxicam (MOBIC) 15 mg tablet Take 1 tablet (15 mg total) by mouth daily 30 tablet 1   • methocarbamol (ROBAXIN) 500 mg tablet Take 1 tablet (500 mg total) by mouth every 8 (eight) hours as needed for muscle spasms 90 tablet 0     No current facility-administered medications for this visit. Review of Systems   Constitutional:  Negative for activity change, chills, diaphoresis, fatigue and fever. HENT: Negative. Eyes:  Negative for visual disturbance. Respiratory:  Negative for cough, chest tightness, shortness of breath and wheezing. Cardiovascular:  Negative for chest pain, palpitations and leg swelling. Gastrointestinal:  Negative for abdominal pain, constipation, diarrhea, nausea and vomiting. Endocrine: Negative for cold intolerance and heat intolerance. Genitourinary:  Negative for difficulty urinating, dysuria and frequency. Musculoskeletal:  Negative for arthralgias, gait problem and myalgias. Neurological:  Negative for dizziness, seizures, syncope, weakness, light-headedness and headaches. Psychiatric/Behavioral:  Negative for confusion, dysphoric mood and sleep disturbance. The patient is not nervous/anxious.         PHQ-2/9 Depression Screening    Little interest or pleasure in doing things: 0 - not at all  Feeling down, depressed, or hopeless: 0 - not at all  PHQ-2 Score: 0  PHQ-2 Interpretation: Negative depression screen        Objective:  Vitals:    11/30/23 1046   BP: 134/74   Pulse: 84   Temp: 97.8 °F (36.6 °C)   SpO2: 99%   Weight: 69.4 kg (153 lb)   Height: 5' 6" (1.676 m)     Body mass index is 24.69 kg/m². Physical Exam  Vitals and nursing note reviewed. Constitutional:       General: He is not in acute distress. Appearance: Normal appearance. He is not ill-appearing. HENT:      Head: Normocephalic and atraumatic. Mouth/Throat:      Mouth: Mucous membranes are moist.   Eyes:      Extraocular Movements: Extraocular movements intact. Conjunctiva/sclera: Conjunctivae normal.      Pupils: Pupils are equal, round, and reactive to light. Neck:      Vascular: No carotid bruit. Cardiovascular:      Rate and Rhythm: Normal rate and regular rhythm. Heart sounds: Normal heart sounds. No murmur heard. Pulmonary:      Effort: Pulmonary effort is normal. No respiratory distress. Breath sounds: Normal breath sounds. No wheezing, rhonchi or rales. Abdominal:      General: Bowel sounds are normal. There is no distension. Palpations: Abdomen is soft. Tenderness: There is no abdominal tenderness. Genitourinary:     Comments: PATIENCE deferred by pt. Musculoskeletal:      Cervical back: Neck supple. Right lower leg: No edema. Left lower leg: No edema. Neurological:      General: No focal deficit present. Mental Status: He is alert and oriented to person, place, and time. Mental status is at baseline. Psychiatric:         Mood and Affect: Mood normal.         Behavior: Behavior normal.         Thought Content:  Thought content normal.         Judgment: Judgment normal.

## 2024-01-10 DIAGNOSIS — M79.602 PARESTHESIA AND PAIN OF BOTH UPPER EXTREMITIES: ICD-10-CM

## 2024-01-10 DIAGNOSIS — R20.2 PARESTHESIA AND PAIN OF BOTH UPPER EXTREMITIES: ICD-10-CM

## 2024-01-10 DIAGNOSIS — M54.41 ACUTE MIDLINE LOW BACK PAIN WITH RIGHT-SIDED SCIATICA: ICD-10-CM

## 2024-01-10 DIAGNOSIS — M79.601 PARESTHESIA AND PAIN OF BOTH UPPER EXTREMITIES: ICD-10-CM

## 2024-01-10 RX ORDER — METHOCARBAMOL 500 MG/1
500 TABLET, FILM COATED ORAL EVERY 8 HOURS PRN
Qty: 90 TABLET | Refills: 0 | Status: SHIPPED | OUTPATIENT
Start: 2024-01-10

## 2024-01-11 ENCOUNTER — DOCUMENTATION (OUTPATIENT)
Dept: INTERNAL MEDICINE CLINIC | Facility: CLINIC | Age: 74
End: 2024-01-11

## 2024-01-12 ENCOUNTER — TELEPHONE (OUTPATIENT)
Dept: INTERNAL MEDICINE CLINIC | Facility: CLINIC | Age: 74
End: 2024-01-12

## 2024-01-12 DIAGNOSIS — R56.9 SEIZURE (HCC): Primary | ICD-10-CM

## 2024-02-07 DIAGNOSIS — M79.601 PARESTHESIA AND PAIN OF BOTH UPPER EXTREMITIES: ICD-10-CM

## 2024-02-07 DIAGNOSIS — M79.602 PARESTHESIA AND PAIN OF BOTH UPPER EXTREMITIES: ICD-10-CM

## 2024-02-07 DIAGNOSIS — R20.2 PARESTHESIA AND PAIN OF BOTH UPPER EXTREMITIES: ICD-10-CM

## 2024-02-07 RX ORDER — MELOXICAM 15 MG/1
15 TABLET ORAL DAILY
Qty: 30 TABLET | Refills: 1 | Status: SHIPPED | OUTPATIENT
Start: 2024-02-07

## 2024-02-20 DIAGNOSIS — E03.9 ACQUIRED HYPOTHYROIDISM: ICD-10-CM

## 2024-02-20 RX ORDER — LEVOTHYROXINE SODIUM 0.1 MG/1
100 TABLET ORAL
Qty: 90 TABLET | Refills: 1 | Status: SHIPPED | OUTPATIENT
Start: 2024-02-20

## 2024-03-18 ENCOUNTER — OFFICE VISIT (OUTPATIENT)
Dept: INTERNAL MEDICINE CLINIC | Facility: CLINIC | Age: 74
End: 2024-03-18
Payer: MEDICARE

## 2024-03-18 VITALS
DIASTOLIC BLOOD PRESSURE: 78 MMHG | TEMPERATURE: 98.6 F | HEART RATE: 89 BPM | WEIGHT: 151 LBS | OXYGEN SATURATION: 99 % | BODY MASS INDEX: 25.16 KG/M2 | SYSTOLIC BLOOD PRESSURE: 128 MMHG | HEIGHT: 65 IN

## 2024-03-18 DIAGNOSIS — R56.9 SEIZURE (HCC): ICD-10-CM

## 2024-03-18 DIAGNOSIS — I73.9 PERIPHERAL VASCULAR DISEASE (HCC): ICD-10-CM

## 2024-03-18 DIAGNOSIS — E78.2 MIXED HYPERLIPIDEMIA: Primary | ICD-10-CM

## 2024-03-18 DIAGNOSIS — E78.3 MIXED HYPERGLYCERIDEMIA: ICD-10-CM

## 2024-03-18 DIAGNOSIS — E03.9 ACQUIRED HYPOTHYROIDISM: ICD-10-CM

## 2024-03-18 DIAGNOSIS — E80.6 HYPERBILIRUBINEMIA: ICD-10-CM

## 2024-03-18 DIAGNOSIS — Z87.891 FORMER SMOKER: ICD-10-CM

## 2024-03-18 DIAGNOSIS — D69.6 PLATELETS DECREASED (HCC): ICD-10-CM

## 2024-03-18 DIAGNOSIS — R73.03 PREDIABETES: ICD-10-CM

## 2024-03-18 PROCEDURE — G2211 COMPLEX E/M VISIT ADD ON: HCPCS | Performed by: INTERNAL MEDICINE

## 2024-03-18 PROCEDURE — 99214 OFFICE O/P EST MOD 30 MIN: CPT | Performed by: INTERNAL MEDICINE

## 2024-03-18 RX ORDER — ATORVASTATIN CALCIUM 10 MG/1
10 TABLET, FILM COATED ORAL DAILY
Qty: 90 TABLET | Refills: 1 | Status: SHIPPED | OUTPATIENT
Start: 2024-03-18

## 2024-03-18 RX ORDER — LEVOTHYROXINE SODIUM 0.1 MG/1
100 TABLET ORAL
Qty: 90 TABLET | Refills: 1 | Status: SHIPPED | OUTPATIENT
Start: 2024-03-18

## 2024-03-18 RX ORDER — DIVALPROEX SODIUM 500 MG/1
500 TABLET, DELAYED RELEASE ORAL EVERY 12 HOURS SCHEDULED
Qty: 180 TABLET | Refills: 3 | Status: SHIPPED | OUTPATIENT
Start: 2024-03-18 | End: 2024-03-20 | Stop reason: SDUPTHER

## 2024-03-18 NOTE — PATIENT INSTRUCTIONS
-Hyperlipidemia   WHAT YOU NEED TO KNOW:   What is hyperlipidemia?  Hyperlipidemia is a high level of lipids (fats) in your blood. These lipids include cholesterol or triglycerides. Lipids are made by your body. They also come from the foods you eat. Your body needs lipids to work properly, but high levels increase your risk for heart disease, heart attack, and stroke.  What increases my risk for hyperlipidemia?   Family history of high lipid levels    Diet high in saturated fats, cholesterol, or calories    High alcohol intake or smoking    Lack of regular physical activity    Medical conditions such as hypothyroidism, obesity, or type 2 diabetes    Certain medicines, such as blood pressure medicines, hormones, and steroids    How is hyperlipidemia managed and treated?  Your healthcare provider may first recommend that you make lifestyle changes to help decrease your lipid levels. Your provider may recommend you work with a team to manage hyperlipidemia. The team may include medical experts such as a dietitian, an exercise or physical therapist, and a behavior therapist. Your family members may be included in helping you create lifestyle changes. You may also need to take medicine to lower your lipid levels. Some of the lifestyle changes you may need to make include the following:  Maintain a healthy weight.  Ask your healthcare provider what a healthy weight is for you. Ask your provider to help you create a weight loss plan, if needed. Weight loss can decrease your cholesterol and triglyceride levels.    Be physically active throughout the day.  Physical activity, such as exercise, lowers your cholesterol levels and helps you maintain a healthy weight. Get 30 minutes or more of aerobic exercise 4 to 6 days each week. You can split your exercise into four 10-minute workouts instead of 30 minutes at one time. Examples of aerobic exercises include walking briskly, swimming, or riding a bike. Work with your  healthcare provider to plan the best exercise program for you. Also include strength training at least 2 times each week. Your healthcare providers can help you create a physical activity plan.            Do not smoke.  Nicotine and other chemicals in cigarettes and cigars can increase your risk for a heart attack and stroke. Ask your healthcare provider for information if you currently smoke and need help to quit. E-cigarettes or smokeless tobacco still contain nicotine. Talk to your healthcare provider before you use these products.         Eat heart-healthy foods.  A dietitian or your provider can give you more information on low-sodium plans or the DASH (Dietary Approaches to Stop Hypertension) eating plan. The DASH plan is low in sodium, processed sugar, unhealthy fats, and total fat. It is high in potassium, calcium, and fiber. It is high in potassium, calcium, and fiber. These can be found in vegetables, fruit, and whole-grain foods. The following are ways to get more heart-healthy foods:         Decrease the total amount of fat you eat.  Choose lean meats, fat-free or 1% fat milk, and low-fat dairy products, such as yogurt and cheese. Limit or do not eat red meat. Red meats are high in fat and cholesterol.    Replace unhealthy fats with healthy fats.  Unhealthy fats include saturated fat, trans fat, and cholesterol. Choose soft margarines that are low in saturated fat and have little or no trans fat. Monounsaturated fats are healthy fats. These are found in olive oil, canola oil, avocado, and nuts. Polyunsaturated fats are also healthy. These are found in fish, flaxseed, walnuts, and soybeans.    Eat 5 or more servings of fruits and vegetables every day.  They are low in calories and fat, and a good source of essential vitamins. Include dark green, red, and orange vegetables. Examples include spinach, kale, broccoli, and carrots.    Eat foods high in fiber.  Fiber can help lower your cholesterol levels.  Choose whole grain, high-fiber foods. Good choices include whole-wheat breads or cereals, beans, peas, fruits, and vegetables.         Limit sodium (salt) as directed.  Too much sodium can affect your fluid balance and blood pressure. Your healthcare provider will tell you how much sodium and potassium are safe for you to have in a day. Your provider may recommend that you limit sodium to 2,300 mg a day. Your provider or a dietitian can help you find ways to limit sodium. For example, if you add salt while you cook, do not add more salt at the table. Check labels to find low-sodium or no-salt-added foods. Some low-sodium foods use potassium salts for flavor. Too much potassium can also cause health problems.       Ask your healthcare provider if it is okay for you to drink alcohol.  Alcohol can increase your cholesterol and triglyceride levels. Your provider can tell you how many drinks are okay to have within 24 hours and within 1 week. A drink of alcohol is 12 ounces of beer, 5 ounces of wine, or 1½ ounces of liquor.    Call your local emergency number (911 in the ) or have someone call if:   You have any of the following signs of a heart attack:      Squeezing, pressure, or pain in your chest    You may  also have any of the following:     Discomfort or pain in your back, neck, jaw, stomach, or arm    Shortness of breath    Nausea or vomiting    Lightheadedness or a sudden cold sweat    You have any of the following signs of a stroke:      Numbness or drooping on one side of your face     Weakness in an arm or leg    Confusion or difficulty speaking    Dizziness, a severe headache, or vision loss    When should I call my doctor?   You have questions or concerns about your condition or care.      CARE AGREEMENT:   You have the right to help plan your care. Learn about your health condition and how it may be treated. Discuss treatment options with your healthcare providers to decide what care you want to receive.  You always have the right to refuse treatment. The above information is an  only. It is not intended as medical advice for individual conditions or treatments. Talk to your doctor, nurse or pharmacist before following any medical regimen to see if it is safe and effective for you.  © Copyright [employer] 2023 Information is for End User's use only and may not be sold, redistributed or otherwise used for commercial purposes.

## 2024-03-18 NOTE — PROGRESS NOTES
Assessment/Plan:  Problem List Items Addressed This Visit          Cardiovascular and Mediastinum    Peripheral vascular disease (HCC)       Behavioral Health    Former smoker       Blood    Platelets decreased (HCC)       Neurology/Sleep    Seizure (HCC)    Relevant Medications    divalproex sodium (DEPAKOTE) 500 mg DR tablet    Other Relevant Orders    CBC and differential    Comprehensive metabolic panel    Valproic acid level, total       Other    Prediabetes    Mixed hyperlipidemia - Primary    Relevant Medications    atorvastatin (LIPITOR) 10 mg tablet    Hyperbilirubinemia     Other Visit Diagnoses       Acquired hypothyroidism        Relevant Medications    levothyroxine 100 mcg tablet    Mixed hyperglyceridemia        Relevant Medications    atorvastatin (LIPITOR) 10 mg tablet    Other Relevant Orders    Comprehensive metabolic panel    LDL cholesterol, direct    Triglycerides             Diagnoses and all orders for this visit:    Mixed hyperlipidemia    Prediabetes    Hyperbilirubinemia    Seizure (HCC)  -     divalproex sodium (DEPAKOTE) 500 mg DR tablet; Take 1 tablet (500 mg total) by mouth every 12 (twelve) hours  -     CBC and differential; Future  -     Comprehensive metabolic panel; Future  -     Valproic acid level, total; Future    Platelets decreased (HCC)    Former smoker    Acquired hypothyroidism  -     levothyroxine 100 mcg tablet; Take 1 tablet (100 mcg total) by mouth daily in the early morning    Mixed hyperglyceridemia  -     atorvastatin (LIPITOR) 10 mg tablet; Take 1 tablet (10 mg total) by mouth daily  -     Comprehensive metabolic panel; Future  -     LDL cholesterol, direct; Future  -     Triglycerides; Future    Peripheral vascular disease (HCC)        No problem-specific Assessment & Plan notes found for this encounter.    A/P: doing ok and will check labs. Continue current treatment and RTC six months for routine.     Subjective:      Patient ID: Regino Mitchell is a 73 y.o.  male.     RTC for f/u HLD, prediabetes, etc. Doing ok and no new issues. Remains active w/o difficulty and no falls. No sz activity to report. Due for labs.         The following portions of the patient's history were reviewed and updated as appropriate:   He has a past medical history of High cholesterol and Seizure (HCC).,  does not have any pertinent problems on file.,   has a past surgical history that includes Wrist surgery (Right) and Hernia repair.,  family history includes Cancer in his brother and mother; Diabetes in his mother; Prostate cancer in his brother and father.,   reports that he quit smoking about 10 years ago. His smoking use included cigars. He has been exposed to tobacco smoke. He has never used smokeless tobacco. He reports current alcohol use. He reports that he does not use drugs.,  has No Known Allergies..  Current Outpatient Medications   Medication Sig Dispense Refill    atorvastatin (LIPITOR) 10 mg tablet Take 1 tablet (10 mg total) by mouth daily 90 tablet 1    divalproex sodium (DEPAKOTE) 500 mg DR tablet Take 1 tablet (500 mg total) by mouth every 12 (twelve) hours 180 tablet 3    ergocalciferol (VITAMIN D2) 50,000 units Take 1 capsule (50,000 Units total) by mouth once a week 12 capsule 3    levothyroxine 100 mcg tablet Take 1 tablet (100 mcg total) by mouth daily in the early morning 90 tablet 1    meloxicam (MOBIC) 15 mg tablet TAKE 1 TABLET (15 MG TOTAL) BY MOUTH DAILY. 30 tablet 1    methocarbamol (ROBAXIN) 500 mg tablet TAKE 1 TABLET BY MOUTH EVERY 8 HOURS AS NEEDED FOR MUSCLE SPASMS. 90 tablet 0     No current facility-administered medications for this visit.       Review of Systems   Constitutional:  Negative for activity change, chills, diaphoresis, fatigue and fever.   HENT: Negative.     Eyes:  Negative for visual disturbance.   Respiratory:  Negative for cough, chest tightness, shortness of breath and wheezing.    Cardiovascular:  Negative for chest pain, palpitations  "and leg swelling.   Gastrointestinal:  Negative for abdominal pain, constipation, diarrhea, nausea and vomiting.   Endocrine: Negative for cold intolerance and heat intolerance.   Genitourinary:  Negative for difficulty urinating, dysuria and frequency.   Musculoskeletal:  Negative for arthralgias, gait problem and myalgias.   Neurological:  Negative for dizziness, seizures, syncope, weakness, light-headedness and headaches.   Psychiatric/Behavioral:  Negative for confusion, dysphoric mood and sleep disturbance. The patient is not nervous/anxious.        PHQ-2/9 Depression Screening    Little interest or pleasure in doing things: 0 - not at all  Feeling down, depressed, or hopeless: 0 - not at all  PHQ-2 Score: 0  PHQ-2 Interpretation: Negative depression screen        Objective:  Vitals:    03/18/24 0936   BP: 128/78   Pulse: 89   Temp: 98.6 °F (37 °C)   SpO2: 99%   Weight: 68.5 kg (151 lb)   Height: 5' 5\" (1.651 m)     Body mass index is 25.13 kg/m².     Physical Exam  Vitals and nursing note reviewed.   Constitutional:       General: He is not in acute distress.     Appearance: Normal appearance. He is not ill-appearing.   HENT:      Head: Normocephalic and atraumatic.      Mouth/Throat:      Mouth: Mucous membranes are moist.   Eyes:      Extraocular Movements: Extraocular movements intact.      Conjunctiva/sclera: Conjunctivae normal.      Pupils: Pupils are equal, round, and reactive to light.   Neck:      Vascular: No carotid bruit.   Cardiovascular:      Rate and Rhythm: Normal rate and regular rhythm.      Heart sounds: Normal heart sounds. No murmur heard.  Pulmonary:      Effort: Pulmonary effort is normal. No respiratory distress.      Breath sounds: Normal breath sounds. No wheezing, rhonchi or rales.   Abdominal:      General: Bowel sounds are normal. There is no distension.      Palpations: Abdomen is soft.      Tenderness: There is no abdominal tenderness.   Musculoskeletal:      Cervical back: Neck " supple.      Right lower leg: No edema.      Left lower leg: No edema.   Neurological:      General: No focal deficit present.      Mental Status: He is alert and oriented to person, place, and time. Mental status is at baseline.   Psychiatric:         Mood and Affect: Mood normal.         Behavior: Behavior normal.         Thought Content: Thought content normal.         Judgment: Judgment normal.

## 2024-03-19 ENCOUNTER — APPOINTMENT (OUTPATIENT)
Dept: LAB | Facility: CLINIC | Age: 74
End: 2024-03-19
Payer: MEDICARE

## 2024-03-19 DIAGNOSIS — R56.9 SEIZURE (HCC): ICD-10-CM

## 2024-03-19 DIAGNOSIS — E78.3 MIXED HYPERGLYCERIDEMIA: ICD-10-CM

## 2024-03-19 LAB
ALBUMIN SERPL BCP-MCNC: 4 G/DL (ref 3.5–5)
ALP SERPL-CCNC: 38 U/L (ref 34–104)
ALT SERPL W P-5'-P-CCNC: 15 U/L (ref 7–52)
ANION GAP SERPL CALCULATED.3IONS-SCNC: 7 MMOL/L (ref 4–13)
AST SERPL W P-5'-P-CCNC: 19 U/L (ref 13–39)
BASOPHILS # BLD AUTO: 0.03 THOUSANDS/ÂΜL (ref 0–0.1)
BASOPHILS NFR BLD AUTO: 0 % (ref 0–1)
BILIRUB SERPL-MCNC: 0.8 MG/DL (ref 0.2–1)
BUN SERPL-MCNC: 16 MG/DL (ref 5–25)
CALCIUM SERPL-MCNC: 9.2 MG/DL (ref 8.4–10.2)
CHLORIDE SERPL-SCNC: 104 MMOL/L (ref 96–108)
CO2 SERPL-SCNC: 29 MMOL/L (ref 21–32)
CREAT SERPL-MCNC: 0.84 MG/DL (ref 0.6–1.3)
EOSINOPHIL # BLD AUTO: 0.12 THOUSAND/ÂΜL (ref 0–0.61)
EOSINOPHIL NFR BLD AUTO: 2 % (ref 0–6)
ERYTHROCYTE [DISTWIDTH] IN BLOOD BY AUTOMATED COUNT: 13 % (ref 11.6–15.1)
GFR SERPL CREATININE-BSD FRML MDRD: 86 ML/MIN/1.73SQ M
GLUCOSE P FAST SERPL-MCNC: 104 MG/DL (ref 65–99)
HCT VFR BLD AUTO: 41.9 % (ref 36.5–49.3)
HGB BLD-MCNC: 13.5 G/DL (ref 12–17)
IMM GRANULOCYTES # BLD AUTO: 0.02 THOUSAND/UL (ref 0–0.2)
IMM GRANULOCYTES NFR BLD AUTO: 0 % (ref 0–2)
LDLC SERPL DIRECT ASSAY-MCNC: 100 MG/DL (ref 0–100)
LYMPHOCYTES # BLD AUTO: 4.21 THOUSANDS/ÂΜL (ref 0.6–4.47)
LYMPHOCYTES NFR BLD AUTO: 57 % (ref 14–44)
MCH RBC QN AUTO: 30.1 PG (ref 26.8–34.3)
MCHC RBC AUTO-ENTMCNC: 32.2 G/DL (ref 31.4–37.4)
MCV RBC AUTO: 94 FL (ref 82–98)
MONOCYTES # BLD AUTO: 0.75 THOUSAND/ÂΜL (ref 0.17–1.22)
MONOCYTES NFR BLD AUTO: 10 % (ref 4–12)
NEUTROPHILS # BLD AUTO: 2.31 THOUSANDS/ÂΜL (ref 1.85–7.62)
NEUTS SEG NFR BLD AUTO: 31 % (ref 43–75)
NRBC BLD AUTO-RTO: 0 /100 WBCS
PLATELET # BLD AUTO: 134 THOUSANDS/UL (ref 149–390)
PMV BLD AUTO: 11.2 FL (ref 8.9–12.7)
POTASSIUM SERPL-SCNC: 4.2 MMOL/L (ref 3.5–5.3)
PROT SERPL-MCNC: 5.9 G/DL (ref 6.4–8.4)
RBC # BLD AUTO: 4.48 MILLION/UL (ref 3.88–5.62)
SODIUM SERPL-SCNC: 140 MMOL/L (ref 135–147)
TRIGL SERPL-MCNC: 191 MG/DL
VALPROATE SERPL-MCNC: 114 UG/ML (ref 50–100)
WBC # BLD AUTO: 7.44 THOUSAND/UL (ref 4.31–10.16)

## 2024-03-19 PROCEDURE — 80053 COMPREHEN METABOLIC PANEL: CPT

## 2024-03-19 PROCEDURE — 85025 COMPLETE CBC W/AUTO DIFF WBC: CPT

## 2024-03-19 PROCEDURE — 36415 COLL VENOUS BLD VENIPUNCTURE: CPT

## 2024-03-19 PROCEDURE — 84478 ASSAY OF TRIGLYCERIDES: CPT

## 2024-03-19 PROCEDURE — 83721 ASSAY OF BLOOD LIPOPROTEIN: CPT

## 2024-03-19 PROCEDURE — 80164 ASSAY DIPROPYLACETIC ACD TOT: CPT

## 2024-03-20 ENCOUNTER — TELEPHONE (OUTPATIENT)
Dept: INTERNAL MEDICINE CLINIC | Facility: CLINIC | Age: 74
End: 2024-03-20

## 2024-03-20 DIAGNOSIS — R73.03 PREDIABETES: ICD-10-CM

## 2024-03-20 DIAGNOSIS — R56.9 SEIZURE (HCC): ICD-10-CM

## 2024-03-20 DIAGNOSIS — E78.2 MIXED HYPERLIPIDEMIA: Primary | ICD-10-CM

## 2024-03-20 RX ORDER — DIVALPROEX SODIUM 250 MG/1
TABLET, DELAYED RELEASE ORAL
Qty: 270 TABLET | Refills: 1 | Status: SHIPPED | OUTPATIENT
Start: 2024-03-20

## 2024-03-20 RX ORDER — ICOSAPENT ETHYL 1000 MG/1
2 CAPSULE ORAL 2 TIMES DAILY
Qty: 360 CAPSULE | Refills: 3 | Status: SHIPPED | OUTPATIENT
Start: 2024-03-20

## 2024-05-29 ENCOUNTER — TELEPHONE (OUTPATIENT)
Dept: ADMINISTRATIVE | Facility: OTHER | Age: 74
End: 2024-05-29

## 2024-05-29 NOTE — TELEPHONE ENCOUNTER
05/29/24 10:22 AM    Patient contacted to bring Advance Directive, POLST, or Living Will document to next scheduled pcp visit.[facility] Department spoke with patient.    Thank you.  Karla Walker  [facility]

## 2024-05-30 ENCOUNTER — OFFICE VISIT (OUTPATIENT)
Dept: INTERNAL MEDICINE CLINIC | Facility: CLINIC | Age: 74
End: 2024-05-30
Payer: MEDICARE

## 2024-05-30 VITALS
HEIGHT: 65 IN | DIASTOLIC BLOOD PRESSURE: 86 MMHG | OXYGEN SATURATION: 99 % | SYSTOLIC BLOOD PRESSURE: 132 MMHG | BODY MASS INDEX: 25.33 KG/M2 | TEMPERATURE: 97.9 F | HEART RATE: 76 BPM | WEIGHT: 152 LBS

## 2024-05-30 DIAGNOSIS — Z00.00 MEDICARE ANNUAL WELLNESS VISIT, SUBSEQUENT: ICD-10-CM

## 2024-05-30 DIAGNOSIS — R56.9 SEIZURE (HCC): ICD-10-CM

## 2024-05-30 DIAGNOSIS — E78.2 MIXED HYPERLIPIDEMIA: Primary | ICD-10-CM

## 2024-05-30 DIAGNOSIS — E55.9 VITAMIN D DEFICIENCY: ICD-10-CM

## 2024-05-30 DIAGNOSIS — R73.03 PREDIABETES: ICD-10-CM

## 2024-05-30 DIAGNOSIS — B35.1 ONYCHOMYCOSIS: ICD-10-CM

## 2024-05-30 DIAGNOSIS — D69.6 PLATELETS DECREASED (HCC): ICD-10-CM

## 2024-05-30 DIAGNOSIS — R79.89 ABNORMAL THYROID BLOOD TEST: ICD-10-CM

## 2024-05-30 DIAGNOSIS — I73.9 PERIPHERAL VASCULAR DISEASE (HCC): ICD-10-CM

## 2024-05-30 PROCEDURE — G0439 PPPS, SUBSEQ VISIT: HCPCS | Performed by: INTERNAL MEDICINE

## 2024-05-30 PROCEDURE — 99214 OFFICE O/P EST MOD 30 MIN: CPT | Performed by: INTERNAL MEDICINE

## 2024-05-30 RX ORDER — TERBINAFINE HYDROCHLORIDE 250 MG/1
250 TABLET ORAL DAILY
Qty: 30 TABLET | Refills: 0 | Status: SHIPPED | OUTPATIENT
Start: 2024-05-30 | End: 2024-08-28

## 2024-05-30 NOTE — PROGRESS NOTES
Assessment/Plan:  Problem List Items Addressed This Visit          Cardiovascular and Mediastinum    Peripheral vascular disease (HCC)    Relevant Orders    TSH, 3rd generation       Blood    Platelets decreased (HCC)    Relevant Orders    TSH, 3rd generation       Neurology/Sleep    Seizure (HCC)    Relevant Orders    TSH, 3rd generation    Valproic acid level, total       Other    Vitamin D deficiency    Relevant Orders    TSH, 3rd generation    Vitamin D 25 hydroxy    Prediabetes    Relevant Orders    TSH, 3rd generation    Mixed hyperlipidemia - Primary    Relevant Orders    TSH, 3rd generation    Abnormal thyroid blood test    Relevant Orders    TSH, 3rd generation     Other Visit Diagnoses       Medicare annual wellness visit, subsequent        Relevant Orders    TSH, 3rd generation    Onychomycosis        Relevant Medications    terbinafine (LamISIL) 250 mg tablet    Other Relevant Orders    Ambulatory Referral to Podiatry             Diagnoses and all orders for this visit:    Mixed hyperlipidemia  -     TSH, 3rd generation; Future    Prediabetes  -     TSH, 3rd generation; Future    Vitamin D deficiency  -     TSH, 3rd generation; Future  -     Vitamin D 25 hydroxy; Future    Abnormal thyroid blood test  -     TSH, 3rd generation; Future    Seizure (HCC)  -     TSH, 3rd generation; Future  -     Valproic acid level, total; Future    Platelets decreased (HCC)  -     TSH, 3rd generation; Future    Peripheral vascular disease (HCC)  -     TSH, 3rd generation; Future    Medicare annual wellness visit, subsequent  -     TSH, 3rd generation; Future    Onychomycosis  -     terbinafine (LamISIL) 250 mg tablet; Take 1 tablet (250 mg total) by mouth daily  -     Ambulatory Referral to Podiatry; Future        No problem-specific Assessment & Plan notes found for this encounter.    A/P: Doing ok and will check labs.Will try oral lamisil for the toenails, but also refer to DPM to see about filing down the nails given  pain. Continue current treatment and RTC six months for routine.    Subjective:      Patient ID: Regino Mitchell is a 74 y.o. male.     RTC for f/u Sz, HLD, etc. Doing ok and no new issues except for painful and thick toenails. . Remains active w/o difficulty and no falls. NO claudication. No sz activity to report. Due for labs.         The following portions of the patient's history were reviewed and updated as appropriate:   He has a past medical history of High cholesterol and Seizure (HCC).,  does not have any pertinent problems on file.,   has a past surgical history that includes Wrist surgery (Right) and Hernia repair.,  family history includes Cancer in his brother and mother; Diabetes in his mother; Prostate cancer in his brother and father.,   reports that he quit smoking about 10 years ago. His smoking use included cigars. He has been exposed to tobacco smoke. He has never used smokeless tobacco. He reports current alcohol use. He reports that he does not use drugs.,  has No Known Allergies..  Current Outpatient Medications   Medication Sig Dispense Refill    atorvastatin (LIPITOR) 10 mg tablet Take 1 tablet (10 mg total) by mouth daily 90 tablet 1    divalproex sodium (DEPAKOTE) 250 mg DR tablet Two in the AM and one q  tablet 1    ergocalciferol (VITAMIN D2) 50,000 units Take 1 capsule (50,000 Units total) by mouth once a week 12 capsule 3    Icosapent Ethyl (Vascepa) 1 g CAPS Take 2 capsules (2 g total) by mouth 2 (two) times a day 360 capsule 3    levothyroxine 100 mcg tablet Take 1 tablet (100 mcg total) by mouth daily in the early morning 90 tablet 1    meloxicam (MOBIC) 15 mg tablet TAKE 1 TABLET (15 MG TOTAL) BY MOUTH DAILY. 30 tablet 1    methocarbamol (ROBAXIN) 500 mg tablet TAKE 1 TABLET BY MOUTH EVERY 8 HOURS AS NEEDED FOR MUSCLE SPASMS. 90 tablet 0    terbinafine (LamISIL) 250 mg tablet Take 1 tablet (250 mg total) by mouth daily 30 tablet 0     No current facility-administered  "medications for this visit.       Review of Systems   Constitutional:  Negative for activity change, chills, diaphoresis, fatigue and fever.   HENT: Negative.     Eyes:  Negative for visual disturbance.   Respiratory:  Negative for cough, chest tightness, shortness of breath and wheezing.    Cardiovascular:  Negative for chest pain, palpitations and leg swelling.   Gastrointestinal:  Negative for abdominal pain, constipation, diarrhea, nausea and vomiting.   Endocrine: Negative for cold intolerance and heat intolerance.   Genitourinary:  Negative for difficulty urinating, dysuria and frequency.   Musculoskeletal:  Negative for arthralgias, gait problem and myalgias.   Neurological:  Negative for dizziness, seizures, syncope, weakness, light-headedness and headaches.   Psychiatric/Behavioral:  Negative for confusion, dysphoric mood and sleep disturbance. The patient is not nervous/anxious.        PHQ-2/9 Depression Screening    Little interest or pleasure in doing things: 0 - not at all  Feeling down, depressed, or hopeless: 0 - not at all  PHQ-2 Score: 0  PHQ-2 Interpretation: Negative depression screen        Objective:  Vitals:    05/30/24 1047   BP: 132/86   Pulse: 76   Temp: 97.9 °F (36.6 °C)   SpO2: 99%   Weight: 68.9 kg (152 lb)   Height: 5' 5\" (1.651 m)     Body mass index is 25.29 kg/m².     Physical Exam  Vitals and nursing note reviewed.   Constitutional:       General: He is not in acute distress.     Appearance: Normal appearance. He is not ill-appearing.   HENT:      Head: Atraumatic.      Mouth/Throat:      Mouth: Mucous membranes are moist.   Eyes:      Extraocular Movements: Extraocular movements intact.      Conjunctiva/sclera: Conjunctivae normal.      Pupils: Pupils are equal, round, and reactive to light.   Neck:      Vascular: No carotid bruit.   Cardiovascular:      Rate and Rhythm: Normal rate and regular rhythm.      Heart sounds: Normal heart sounds. No murmur heard.  Pulmonary:      Effort: " Pulmonary effort is normal. No respiratory distress.      Breath sounds: Normal breath sounds. No wheezing, rhonchi or rales.   Abdominal:      General: Bowel sounds are normal. There is no distension.      Palpations: Abdomen is soft.      Tenderness: There is no abdominal tenderness.   Musculoskeletal:      Cervical back: Neck supple.      Right lower leg: No edema.      Left lower leg: No edema.   Skin:     Findings: Lesion (multiple toes, espicially bilat great toes with thickened, yellow, and brittle nails.) present.   Neurological:      General: No focal deficit present.      Mental Status: He is alert and oriented to person, place, and time. Mental status is at baseline.   Psychiatric:         Mood and Affect: Mood normal.         Behavior: Behavior normal.         Thought Content: Thought content normal.         Judgment: Judgment normal.

## 2024-05-30 NOTE — PATIENT INSTRUCTIONS
Medicare Preventive Visit Patient Instructions  Thank you for completing your Welcome to Medicare Visit or Medicare Annual Wellness Visit today. Your next wellness visit will be due in one year (5/31/2025).  The screening/preventive services that you may require over the next 5-10 years are detailed below. Some tests may not apply to you based off risk factors and/or age. Screening tests ordered at today's visit but not completed yet may show as past due. Also, please note that scanned in results may not display below.  Preventive Screenings:  Service Recommendations Previous Testing/Comments   Colorectal Cancer Screening  Colonoscopy    Fecal Occult Blood Test (FOBT)/Fecal Immunochemical Test (FIT)  Fecal DNA/Cologuard Test  Flexible Sigmoidoscopy Age: 45-75 years old   Colonoscopy: every 10 years (May be performed more frequently if at higher risk)  OR  FOBT/FIT: every 1 year  OR  Cologuard: every 3 years  OR  Sigmoidoscopy: every 5 years  Screening may be recommended earlier than age 45 if at higher risk for colorectal cancer. Also, an individualized decision between you and your healthcare provider will decide whether screening between the ages of 76-85 would be appropriate. Colonoscopy: Not on file  FOBT/FIT: Not on file  Cologuard: 05/11/2022  Sigmoidoscopy: Not on file    Screening Current     Prostate Cancer Screening Individualized decision between patient and health care provider in men between ages of 55-69   Medicare will cover every 12 months beginning on the day after your 50th birthday PSA: 0.99 ng/mL     Screening Current     Hepatitis C Screening Once for adults born between 1945 and 1965  More frequently in patients at high risk for Hepatitis C Hep C Antibody: 04/14/2022    Screening Current   Diabetes Screening 1-2 times per year if you're at risk for diabetes or have pre-diabetes Fasting glucose: 104 mg/dL (3/19/2024)  A1C: 5.8 % (11/30/2023)  Screening Current   Cholesterol Screening Once every  5 years if you don't have a lipid disorder. May order more often based on risk factors. Lipid panel: 05/31/2023  Screening Not Indicated  History Lipid Disorder      Other Preventive Screenings Covered by Medicare:  Abdominal Aortic Aneurysm (AAA) Screening: covered once if your at risk. You're considered to be at risk if you have a family history of AAA or a male between the age of 65-75 who smoking at least 100 cigarettes in your lifetime.  Lung Cancer Screening: covers low dose CT scan once per year if you meet all of the following conditions: (1) Age 55-77; (2) No signs or symptoms of lung cancer; (3) Current smoker or have quit smoking within the last 15 years; (4) You have a tobacco smoking history of at least 20 pack years (packs per day x number of years you smoked); (5) You get a written order from a healthcare provider.  Glaucoma Screening: covered annually if you're considered high risk: (1) You have diabetes OR (2) Family history of glaucoma OR (3)  aged 50 and older OR (4)  American aged 65 and older  Osteoporosis Screening: covered every 2 years if you meet one of the following conditions: (1) Have a vertebral abnormality; (2) On glucocorticoid therapy for more than 3 months; (3) Have primary hyperparathyroidism; (4) On osteoporosis medications and need to assess response to drug therapy.  HIV Screening: covered annually if you're between the age of 15-65. Also covered annually if you are younger than 15 and older than 65 with risk factors for HIV infection. For pregnant patients, it is covered up to 3 times per pregnancy.    Immunizations:  Immunization Recommendations   Influenza Vaccine Annual influenza vaccination during flu season is recommended for all persons aged >= 6 months who do not have contraindications   Pneumococcal Vaccine   * Pneumococcal conjugate vaccine = PCV13 (Prevnar 13), PCV15 (Vaxneuvance), PCV20 (Prevnar 20)  * Pneumococcal polysaccharide vaccine =  PPSV23 (Pneumovax) Adults 19-65 yo with certain risk factors or if 65+ yo  If never received any pneumonia vaccine: recommend Prevnar 20 (PCV20)  Give PCV20 if previously received 1 dose of PCV13 or PPSV23   Hepatitis B Vaccine 3 dose series if at intermediate or high risk (ex: diabetes, end stage renal disease, liver disease)   Respiratory syncytial virus (RSV) Vaccine - COVERED BY MEDICARE PART D  * RSVPreF3 (Arexvy) CDC recommends that adults 60 years of age and older may receive a single dose of RSV vaccine using shared clinical decision-making (SCDM)   Tetanus (Td) Vaccine - COST NOT COVERED BY MEDICARE PART B Following completion of primary series, a booster dose should be given every 10 years to maintain immunity against tetanus. Td may also be given as tetanus wound prophylaxis.   Tdap Vaccine - COST NOT COVERED BY MEDICARE PART B Recommended at least once for all adults. For pregnant patients, recommended with each pregnancy.   Shingles Vaccine (Shingrix) - COST NOT COVERED BY MEDICARE PART B  2 shot series recommended in those 19 years and older who have or will have weakened immune systems or those 50 years and older     Health Maintenance Due:      Topic Date Due   • Colorectal Cancer Screening  05/11/2025   • Hepatitis C Screening  Completed     Immunizations Due:      Topic Date Due   • COVID-19 Vaccine (1 - 2023-24 season) Never done     Advance Directives   What are advance directives?  Advance directives are legal documents that state your wishes and plans for medical care. These plans are made ahead of time in case you lose your ability to make decisions for yourself. Advance directives can apply to any medical decision, such as the treatments you want, and if you want to donate organs.   What are the types of advance directives?  There are many types of advance directives, and each state has rules about how to use them. You may choose a combination of any of the following:  Living will:  This is a  written record of the treatment you want. You can also choose which treatments you do not want, which to limit, and which to stop at a certain time. This includes surgery, medicine, IV fluid, and tube feedings.   Durable power of  for healthcare (DPAHC):  This is a written record that states who you want to make healthcare choices for you when you are unable to make them for yourself. This person, called a proxy, is usually a family member or a friend. You may choose more than 1 proxy.  Do not resuscitate (DNR) order:  A DNR order is used in case your heart stops beating or you stop breathing. It is a request not to have certain forms of treatment, such as CPR. A DNR order may be included in other types of advance directives.  Medical directive:  This covers the care that you want if you are in a coma, near death, or unable to make decisions for yourself. You can list the treatments you want for each condition. Treatment may include pain medicine, surgery, blood transfusions, dialysis, IV or tube feedings, and a ventilator (breathing machine).  Values history:  This document has questions about your views, beliefs, and how you feel and think about life. This information can help others choose the care that you would choose.  Why are advance directives important?  An advance directive helps you control your care. Although spoken wishes may be used, it is better to have your wishes written down. Spoken wishes can be misunderstood, or not followed. Treatments may be given even if you do not want them. An advance directive may make it easier for your family to make difficult choices about your care.   Weight Management   Why it is important to manage your weight:  Being overweight increases your risk of health conditions such as heart disease, high blood pressure, type 2 diabetes, and certain types of cancer. It can also increase your risk for osteoarthritis, sleep apnea, and other respiratory problems. Aim for  a slow, steady weight loss. Even a small amount of weight loss can lower your risk of health problems.  How to lose weight safely:  A safe and healthy way to lose weight is to eat fewer calories and get regular exercise. You can lose up about 1 pound a week by decreasing the number of calories you eat by 500 calories each day.   Healthy meal plan for weight management:  A healthy meal plan includes a variety of foods, contains fewer calories, and helps you stay healthy. A healthy meal plan includes the following:  Eat whole-grain foods more often.  A healthy meal plan should contain fiber. Fiber is the part of grains, fruits, and vegetables that is not broken down by your body. Whole-grain foods are healthy and provide extra fiber in your diet. Some examples of whole-grain foods are whole-wheat breads and pastas, oatmeal, brown rice, and bulgur.  Eat a variety of vegetables every day.  Include dark, leafy greens such as spinach, kale, tiffanie greens, and mustard greens. Eat yellow and orange vegetables such as carrots, sweet potatoes, and winter squash.   Eat a variety of fruits every day.  Choose fresh or canned fruit (canned in its own juice or light syrup) instead of juice. Fruit juice has very little or no fiber.  Eat low-fat dairy foods.  Drink fat-free (skim) milk or 1% milk. Eat fat-free yogurt and low-fat cottage cheese. Try low-fat cheeses such as mozzarella and other reduced-fat cheeses.  Choose meat and other protein foods that are low in fat.  Choose beans or other legumes such as split peas or lentils. Choose fish, skinless poultry (chicken or turkey), or lean cuts of red meat (beef or pork). Before you cook meat or poultry, cut off any visible fat.   Use less fat and oil.  Try baking foods instead of frying them. Add less fat, such as margarine, sour cream, regular salad dressing and mayonnaise to foods. Eat fewer high-fat foods. Some examples of high-fat foods include french fries, doughnuts, ice  cream, and cakes.  Eat fewer sweets.  Limit foods and drinks that are high in sugar. This includes candy, cookies, regular soda, and sweetened drinks.  Exercise:  Exercise at least 30 minutes per day on most days of the week. Some examples of exercise include walking, biking, dancing, and swimming. You can also fit in more physical activity by taking the stairs instead of the elevator or parking farther away from stores. Ask your healthcare provider about the best exercise plan for you.      © Copyright [employer] 2018 Information is for End User's use only and may not be sold, redistributed or otherwise used for commercial purposes. All illustrations and images included in CareNotes® are the copyrighted property of [employer] or [employer]    Medicare Preventive Visit Patient Instructions  Thank you for completing your Welcome to Medicare Visit or Medicare Annual Wellness Visit today. Your next wellness visit will be due in one year (5/31/2025).  The screening/preventive services that you may require over the next 5-10 years are detailed below. Some tests may not apply to you based off risk factors and/or age. Screening tests ordered at today's visit but not completed yet may show as past due. Also, please note that scanned in results may not display below.  Preventive Screenings:  Service Recommendations Previous Testing/Comments   Colorectal Cancer Screening  Colonoscopy    Fecal Occult Blood Test (FOBT)/Fecal Immunochemical Test (FIT)  Fecal DNA/Cologuard Test  Flexible Sigmoidoscopy Age: 45-75 years old   Colonoscopy: every 10 years (May be performed more frequently if at higher risk)  OR  FOBT/FIT: every 1 year  OR  Cologuard: every 3 years  OR  Sigmoidoscopy: every 5 years  Screening may be recommended earlier than age 45 if at higher risk for colorectal cancer. Also, an individualized decision between you and your healthcare provider will decide whether screening between the ages of 76-85  would be appropriate. Colonoscopy: Not on file  FOBT/FIT: Not on file  Cologuard: 05/11/2022  Sigmoidoscopy: Not on file    Screening Current     Prostate Cancer Screening Individualized decision between patient and health care provider in men between ages of 55-69   Medicare will cover every 12 months beginning on the day after your 50th birthday PSA: 0.99 ng/mL     Screening Current     Hepatitis C Screening Once for adults born between 1945 and 1965  More frequently in patients at high risk for Hepatitis C Hep C Antibody: 04/14/2022    Screening Current   Diabetes Screening 1-2 times per year if you're at risk for diabetes or have pre-diabetes Fasting glucose: 104 mg/dL (3/19/2024)  A1C: 5.8 % (11/30/2023)  Screening Current   Cholesterol Screening Once every 5 years if you don't have a lipid disorder. May order more often based on risk factors. Lipid panel: 05/31/2023  Screening Not Indicated  History Lipid Disorder      Other Preventive Screenings Covered by Medicare:  Abdominal Aortic Aneurysm (AAA) Screening: covered once if your at risk. You're considered to be at risk if you have a family history of AAA or a male between the age of 65-75 who smoking at least 100 cigarettes in your lifetime.  Lung Cancer Screening: covers low dose CT scan once per year if you meet all of the following conditions: (1) Age 55-77; (2) No signs or symptoms of lung cancer; (3) Current smoker or have quit smoking within the last 15 years; (4) You have a tobacco smoking history of at least 20 pack years (packs per day x number of years you smoked); (5) You get a written order from a healthcare provider.  Glaucoma Screening: covered annually if you're considered high risk: (1) You have diabetes OR (2) Family history of glaucoma OR (3)  aged 50 and older OR (4)  American aged 65 and older  Osteoporosis Screening: covered every 2 years if you meet one of the following conditions: (1) Have a vertebral  abnormality; (2) On glucocorticoid therapy for more than 3 months; (3) Have primary hyperparathyroidism; (4) On osteoporosis medications and need to assess response to drug therapy.  HIV Screening: covered annually if you're between the age of 15-65. Also covered annually if you are younger than 15 and older than 65 with risk factors for HIV infection. For pregnant patients, it is covered up to 3 times per pregnancy.    Immunizations:  Immunization Recommendations   Influenza Vaccine Annual influenza vaccination during flu season is recommended for all persons aged >= 6 months who do not have contraindications   Pneumococcal Vaccine   * Pneumococcal conjugate vaccine = PCV13 (Prevnar 13), PCV15 (Vaxneuvance), PCV20 (Prevnar 20)  * Pneumococcal polysaccharide vaccine = PPSV23 (Pneumovax) Adults 19-63 yo with certain risk factors or if 65+ yo  If never received any pneumonia vaccine: recommend Prevnar 20 (PCV20)  Give PCV20 if previously received 1 dose of PCV13 or PPSV23   Hepatitis B Vaccine 3 dose series if at intermediate or high risk (ex: diabetes, end stage renal disease, liver disease)   Respiratory syncytial virus (RSV) Vaccine - COVERED BY MEDICARE PART D  * RSVPreF3 (Arexvy) CDC recommends that adults 60 years of age and older may receive a single dose of RSV vaccine using shared clinical decision-making (SCDM)   Tetanus (Td) Vaccine - COST NOT COVERED BY MEDICARE PART B Following completion of primary series, a booster dose should be given every 10 years to maintain immunity against tetanus. Td may also be given as tetanus wound prophylaxis.   Tdap Vaccine - COST NOT COVERED BY MEDICARE PART B Recommended at least once for all adults. For pregnant patients, recommended with each pregnancy.   Shingles Vaccine (Shingrix) - COST NOT COVERED BY MEDICARE PART B  2 shot series recommended in those 19 years and older who have or will have weakened immune systems or those 50 years and older     Health Maintenance  Due:      Topic Date Due   • Colorectal Cancer Screening  05/11/2025   • Hepatitis C Screening  Completed     Immunizations Due:      Topic Date Due   • COVID-19 Vaccine (1 - 2023-24 season) Never done     Advance Directives   What are advance directives?  Advance directives are legal documents that state your wishes and plans for medical care. These plans are made ahead of time in case you lose your ability to make decisions for yourself. Advance directives can apply to any medical decision, such as the treatments you want, and if you want to donate organs.   What are the types of advance directives?  There are many types of advance directives, and each state has rules about how to use them. You may choose a combination of any of the following:  Living will:  This is a written record of the treatment you want. You can also choose which treatments you do not want, which to limit, and which to stop at a certain time. This includes surgery, medicine, IV fluid, and tube feedings.   Durable power of  for healthcare (DPAHC):  This is a written record that states who you want to make healthcare choices for you when you are unable to make them for yourself. This person, called a proxy, is usually a family member or a friend. You may choose more than 1 proxy.  Do not resuscitate (DNR) order:  A DNR order is used in case your heart stops beating or you stop breathing. It is a request not to have certain forms of treatment, such as CPR. A DNR order may be included in other types of advance directives.  Medical directive:  This covers the care that you want if you are in a coma, near death, or unable to make decisions for yourself. You can list the treatments you want for each condition. Treatment may include pain medicine, surgery, blood transfusions, dialysis, IV or tube feedings, and a ventilator (breathing machine).  Values history:  This document has questions about your views, beliefs, and how you feel and think  about life. This information can help others choose the care that you would choose.  Why are advance directives important?  An advance directive helps you control your care. Although spoken wishes may be used, it is better to have your wishes written down. Spoken wishes can be misunderstood, or not followed. Treatments may be given even if you do not want them. An advance directive may make it easier for your family to make difficult choices about your care.   Weight Management   Why it is important to manage your weight:  Being overweight increases your risk of health conditions such as heart disease, high blood pressure, type 2 diabetes, and certain types of cancer. It can also increase your risk for osteoarthritis, sleep apnea, and other respiratory problems. Aim for a slow, steady weight loss. Even a small amount of weight loss can lower your risk of health problems.  How to lose weight safely:  A safe and healthy way to lose weight is to eat fewer calories and get regular exercise. You can lose up about 1 pound a week by decreasing the number of calories you eat by 500 calories each day.   Healthy meal plan for weight management:  A healthy meal plan includes a variety of foods, contains fewer calories, and helps you stay healthy. A healthy meal plan includes the following:  Eat whole-grain foods more often.  A healthy meal plan should contain fiber. Fiber is the part of grains, fruits, and vegetables that is not broken down by your body. Whole-grain foods are healthy and provide extra fiber in your diet. Some examples of whole-grain foods are whole-wheat breads and pastas, oatmeal, brown rice, and bulgur.  Eat a variety of vegetables every day.  Include dark, leafy greens such as spinach, kale, tiffanie greens, and mustard greens. Eat yellow and orange vegetables such as carrots, sweet potatoes, and winter squash.   Eat a variety of fruits every day.  Choose fresh or canned fruit (canned in its own juice or  light syrup) instead of juice. Fruit juice has very little or no fiber.  Eat low-fat dairy foods.  Drink fat-free (skim) milk or 1% milk. Eat fat-free yogurt and low-fat cottage cheese. Try low-fat cheeses such as mozzarella and other reduced-fat cheeses.  Choose meat and other protein foods that are low in fat.  Choose beans or other legumes such as split peas or lentils. Choose fish, skinless poultry (chicken or turkey), or lean cuts of red meat (beef or pork). Before you cook meat or poultry, cut off any visible fat.   Use less fat and oil.  Try baking foods instead of frying them. Add less fat, such as margarine, sour cream, regular salad dressing and mayonnaise to foods. Eat fewer high-fat foods. Some examples of high-fat foods include french fries, doughnuts, ice cream, and cakes.  Eat fewer sweets.  Limit foods and drinks that are high in sugar. This includes candy, cookies, regular soda, and sweetened drinks.  Exercise:  Exercise at least 30 minutes per day on most days of the week. Some examples of exercise include walking, biking, dancing, and swimming. You can also fit in more physical activity by taking the stairs instead of the elevator or parking farther away from stores. Ask your healthcare provider about the best exercise plan for you.      © Copyright [employer] 2018 Information is for End User's use only and may not be sold, redistributed or otherwise used for commercial purposes. All illustrations and images included in CareNotes® are the copyrighted property of [employer] or [employer]

## 2024-05-31 ENCOUNTER — APPOINTMENT (OUTPATIENT)
Dept: LAB | Facility: CLINIC | Age: 74
End: 2024-05-31
Payer: MEDICARE

## 2024-05-31 DIAGNOSIS — Z00.00 MEDICARE ANNUAL WELLNESS VISIT, SUBSEQUENT: ICD-10-CM

## 2024-05-31 DIAGNOSIS — E55.9 VITAMIN D DEFICIENCY: ICD-10-CM

## 2024-05-31 DIAGNOSIS — R79.89 ABNORMAL THYROID BLOOD TEST: ICD-10-CM

## 2024-05-31 DIAGNOSIS — E78.2 MIXED HYPERLIPIDEMIA: ICD-10-CM

## 2024-05-31 DIAGNOSIS — R56.9 SEIZURE (HCC): ICD-10-CM

## 2024-05-31 DIAGNOSIS — I73.9 PERIPHERAL VASCULAR DISEASE (HCC): ICD-10-CM

## 2024-05-31 DIAGNOSIS — D69.6 PLATELETS DECREASED (HCC): ICD-10-CM

## 2024-05-31 DIAGNOSIS — R73.03 PREDIABETES: ICD-10-CM

## 2024-05-31 LAB
25(OH)D3 SERPL-MCNC: 50.9 NG/ML (ref 30–100)
TSH SERPL DL<=0.05 MIU/L-ACNC: 0.79 UIU/ML (ref 0.45–4.5)
VALPROATE SERPL-MCNC: 67 UG/ML (ref 50–100)

## 2024-05-31 PROCEDURE — 84443 ASSAY THYROID STIM HORMONE: CPT

## 2024-05-31 PROCEDURE — 80164 ASSAY DIPROPYLACETIC ACD TOT: CPT

## 2024-05-31 PROCEDURE — 82306 VITAMIN D 25 HYDROXY: CPT

## 2024-05-31 PROCEDURE — 36415 COLL VENOUS BLD VENIPUNCTURE: CPT

## 2024-06-03 ENCOUNTER — TELEPHONE (OUTPATIENT)
Dept: INTERNAL MEDICINE CLINIC | Facility: CLINIC | Age: 74
End: 2024-06-03

## 2024-07-20 DIAGNOSIS — R56.9 SEIZURE (HCC): ICD-10-CM

## 2024-07-20 RX ORDER — DIVALPROEX SODIUM 250 MG/1
TABLET, DELAYED RELEASE ORAL
Qty: 90 TABLET | Refills: 5 | Status: SHIPPED | OUTPATIENT
Start: 2024-07-20

## 2024-08-11 DIAGNOSIS — M79.601 PARESTHESIA AND PAIN OF BOTH UPPER EXTREMITIES: ICD-10-CM

## 2024-08-11 DIAGNOSIS — M79.602 PARESTHESIA AND PAIN OF BOTH UPPER EXTREMITIES: ICD-10-CM

## 2024-08-11 DIAGNOSIS — R20.2 PARESTHESIA AND PAIN OF BOTH UPPER EXTREMITIES: ICD-10-CM

## 2024-08-12 RX ORDER — MELOXICAM 15 MG/1
15 TABLET ORAL DAILY
Qty: 30 TABLET | Refills: 1 | Status: SHIPPED | OUTPATIENT
Start: 2024-08-12

## 2024-09-03 ENCOUNTER — RA CDI HCC (OUTPATIENT)
Dept: OTHER | Facility: HOSPITAL | Age: 74
End: 2024-09-03

## 2024-09-13 ENCOUNTER — OFFICE VISIT (OUTPATIENT)
Dept: PODIATRY | Facility: CLINIC | Age: 74
End: 2024-09-13
Payer: MEDICARE

## 2024-09-13 VITALS
BODY MASS INDEX: 25.29 KG/M2 | WEIGHT: 152 LBS | DIASTOLIC BLOOD PRESSURE: 100 MMHG | SYSTOLIC BLOOD PRESSURE: 163 MMHG | HEART RATE: 82 BPM

## 2024-09-13 DIAGNOSIS — M79.674 PAIN IN TOES OF BOTH FEET: ICD-10-CM

## 2024-09-13 DIAGNOSIS — M79.675 PAIN IN TOES OF BOTH FEET: ICD-10-CM

## 2024-09-13 DIAGNOSIS — L60.3 NAIL DYSTROPHY: Primary | ICD-10-CM

## 2024-09-13 DIAGNOSIS — B35.1 ONYCHOMYCOSIS: ICD-10-CM

## 2024-09-13 PROCEDURE — 11750 EXCISION NAIL&NAIL MATRIX: CPT | Performed by: PODIATRIST

## 2024-09-13 PROCEDURE — 99212 OFFICE O/P EST SF 10 MIN: CPT | Performed by: PODIATRIST

## 2024-09-13 RX ORDER — LIDOCAINE HYDROCHLORIDE 10 MG/ML
3 INJECTION, SOLUTION EPIDURAL; INFILTRATION; INTRACAUDAL; PERINEURAL ONCE
Status: COMPLETED | OUTPATIENT
Start: 2024-09-13 | End: 2024-09-13

## 2024-09-13 RX ADMIN — LIDOCAINE HYDROCHLORIDE 3 ML: 10 INJECTION, SOLUTION EPIDURAL; INFILTRATION; INTRACAUDAL; PERINEURAL at 10:03

## 2024-09-13 NOTE — PATIENT INSTRUCTIONS
Patient Education     Ingrown toenail   The Basics   Written by the doctors and editors at [facility]   What is an ingrown toenail? -- An ingrown toenail happens when the side or corner of the toenail grows into the flesh around it. It usually affects the big toe.  What are the symptoms of an ingrown toenail? -- The symptoms include pain, redness, and swelling where the nail has grown into the flesh.  Is there a test for an ingrown toenail? -- No. Your doctor or nurse should be able to tell if you have it by learning about your symptoms and doing an exam.  Is there anything I can do on my own to feel better? -- Yes. Some people feel better if they:   Place a small piece of a cotton ball or some dental floss underneath the nail to take pressure off of the toe (figure 1).   Soak the foot in warm, soapy water. Do this for 10 to 20 minutes, 2 to 3 times a day for 1 to 2 weeks. You can also use 1 to 2 teaspoons of Epsom salts (available in pharmacies) in the water instead of soap.  Should I see a doctor or nurse? -- See your doctor or nurse if redness and swelling become worse and there is pus.  How is an ingrown toenail treated? -- If the treatments you tried on your own don't help, your doctor might cut away part of your toenail. They will first inject a medicine to numb your toe. Afterward, you will need to:   Clean the area 2 to 3 times a day. Make a mixture of equal parts of water and hydrogen peroxide, and dab it on your toe with a cotton swab.   Put antibiotic ointment on your toe. Examples include bacitracin and mupirocin (brand name: Bactroban).  Can an ingrown toenail be prevented? -- You can reduce your chances of getting an ingrown toenail if you:   Wear shoes that are not too tight around your toes.   Cut your toenails straight across and not too short.  All topics are updated as new evidence becomes available and our peer review process is complete.  This topic retrieved from [facility] on: Mar 21, 2024.  Topic  17082 Version 10.0  Release: 32.2.4 - C32.79  © 2024 UpToDate, Inc. and/or its affiliates. All rights reserved.  figure 1: Ingrown toenail     (A) An ingrown toenailhappens when the side or corner of the toenail grows into the flesh around it.  (B) It might help to place a small piece of cotton underneath the nail torelieve pressure.  Graphic 854917 Version 1.0  Consumer Information Use and Disclaimer   Disclaimer: This generalized information is a limited summary of diagnosis, treatment, and/or medication information. It is not meant to be comprehensive and should be used as a tool to help the user understand and/or assess potential diagnostic and treatment options. It does NOT include all information about conditions, treatments, medications, side effects, or risks that may apply to a specific patient. It is not intended to be medical advice or a substitute for the medical advice, diagnosis, or treatment of a health care provider based on the health care provider's examination and assessment of a patient's specific and unique circumstances. Patients must speak with a health care provider for complete information about their health, medical questions, and treatment options, including any risks or benefits regarding use of medications. This information does not endorse any treatments or medications as safe, effective, or approved for treating a specific patient. UpToDate, Inc. and its affiliates disclaim any warranty or liability relating to this information or the use thereof.The use of this information is governed by the Terms of Use, available at https://www.wolterskluwer.com/en/know/clinical-effectiveness-terms. 2024© UpToDate, Inc. and its affiliates and/or licensors. All rights reserved.  Copyright   © 2024 UpToDate, Inc. and/or its affiliates. All rights reserved.

## 2024-09-13 NOTE — PROGRESS NOTES
Assessment/Plan:      Diagnoses and all orders for this visit:    Nail dystrophy  -     lidocaine (PF) (XYLOCAINE-MPF) 1 % injection 3 mL    Onychomycosis  -     Ambulatory Referral to Podiatry  -     lidocaine (PF) (XYLOCAINE-MPF) 1 % injection 3 mL    Pain in toes of both feet    Other orders  -     Nail removal          -We did discuss all the options for management of his condition and serial debridement antifungals and permanent removal  - Due to the pain and the exuberant nature of his big toes we will plan for permanent removal and then nail care  - Phenol performed right hallux as below  - Instructions given for care  - Return 2 weeks for repeat assessment and procedure on the left      Nail removal    Date/Time: 9/13/2024 9:45 AM    Performed by: Hugh Dudley DPM  Authorized by: Hugh Dudley DPM    Patient location:  Clinic[facility] Protocol:  Consent: Verbal consent obtained.  Risks and benefits: risks, benefits and alternatives were discussed  Consent given by: patient  Patient understanding: patient states understanding of the procedure being performed  Patient consent: the patient's understanding of the procedure matches consent given  Patient identity confirmed: verbally with patient    Location:     Foot:  R big toe  Pre-procedure details:     Skin preparation:  Alcohol and Betadine    Preparation: Patient was prepped and draped in the usual sterile fashion    Nail Removal:     Nail removed:  Complete  Ingrown nail:     Nail matrix removed or ablated:  Complete  Post-procedure details:     Dressing:  4x4 sterile gauze, antibiotic ointment and gauze roll    Patient tolerance of procedure:  Tolerated well, no immediate complications        Subjective:     Patient ID: Regino Mitchell is a 74 y.o. male.    Patient transfer evaluation management bilateral feet, he did have his nails and calluses per miles earlier this year.  He would like a permanent solution of the bilateral  hallux nails.  They are bothering his socks and shoes that are unsightly and also hurt him        Review of Systems   Constitutional:  Negative for chills and fever.   HENT:  Negative for ear pain and sore throat.    Eyes:  Negative for pain and visual disturbance.   Respiratory:  Negative for cough and shortness of breath.    Cardiovascular:  Negative for chest pain and palpitations.   Gastrointestinal:  Negative for abdominal pain and vomiting.   Genitourinary:  Negative for dysuria and hematuria.   Musculoskeletal:  Negative for arthralgias and back pain.   Skin:  Negative for color change and rash.   Neurological:  Negative for seizures and syncope.   All other systems reviewed and are negative.        Objective:     Physical Exam  Skin:     Comments: Significant bilateral nail dystrophy, absent digital hair growth, DP pulses are lightly palpable +1 and 4, PT pulse are plus 0 out of 4.  Nails bilateral hallux are thickened elongated subungual bruise, very exuberantly thickened

## 2024-09-18 ENCOUNTER — OFFICE VISIT (OUTPATIENT)
Dept: INTERNAL MEDICINE CLINIC | Facility: CLINIC | Age: 74
End: 2024-09-18
Payer: MEDICARE

## 2024-09-18 VITALS
OXYGEN SATURATION: 99 % | BODY MASS INDEX: 25.72 KG/M2 | HEART RATE: 57 BPM | TEMPERATURE: 97.4 F | SYSTOLIC BLOOD PRESSURE: 132 MMHG | DIASTOLIC BLOOD PRESSURE: 82 MMHG | WEIGHT: 154.4 LBS | HEIGHT: 65 IN

## 2024-09-18 DIAGNOSIS — D69.6 PLATELETS DECREASED (HCC): ICD-10-CM

## 2024-09-18 DIAGNOSIS — E80.6 HYPERBILIRUBINEMIA: ICD-10-CM

## 2024-09-18 DIAGNOSIS — I73.9 PERIPHERAL VASCULAR DISEASE (HCC): ICD-10-CM

## 2024-09-18 DIAGNOSIS — R79.89 ABNORMAL THYROID BLOOD TEST: ICD-10-CM

## 2024-09-18 DIAGNOSIS — E78.2 MIXED HYPERLIPIDEMIA: Primary | ICD-10-CM

## 2024-09-18 DIAGNOSIS — E55.9 VITAMIN D DEFICIENCY: ICD-10-CM

## 2024-09-18 DIAGNOSIS — Z12.5 SCREENING FOR PROSTATE CANCER: ICD-10-CM

## 2024-09-18 DIAGNOSIS — R73.03 PREDIABETES: ICD-10-CM

## 2024-09-18 PROCEDURE — 99214 OFFICE O/P EST MOD 30 MIN: CPT | Performed by: INTERNAL MEDICINE

## 2024-09-18 PROCEDURE — G2211 COMPLEX E/M VISIT ADD ON: HCPCS | Performed by: INTERNAL MEDICINE

## 2024-09-18 NOTE — PATIENT INSTRUCTIONS
"Patient Education     High cholesterol   The Basics   Written by the doctors and editors at [facility]   What is cholesterol? -- Cholesterol is a substance found in blood. Everyone has some. It is needed for good health. But people sometimes have too much cholesterol.  Compared with people with normal cholesterol, people with high cholesterol have a higher risk of heart attack, stroke, and other health problems. The higher your cholesterol, the higher your risk of these problems.  Are there different types of cholesterol? -- Yes, there are a few different types. If you get a cholesterol test, you might hear your doctor or nurse talk about:   Total cholesterol   LDL cholesterol - Some people call this the \"bad\" cholesterol. That's because having high LDL levels raises your risk of heart attack, stroke, and other health problems.   HDL cholesterol - Some people call this the \"good\" cholesterol. That's because people with high HDL levels tend to have a lower risk of heart attack, stroke, and other health problems.   Non-HDL cholesterol - Non-HDL cholesterol is your total cholesterol minus your HDL cholesterol.   Triglycerides - Triglycerides are not cholesterol. They are another type of fat. But they often get measured when cholesterol is measured. (Having high triglycerides also seems to increase the risk of heart attack and stroke.)  What should my numbers be? -- Ask your doctor or nurse what your numbers should be. Different people need different goals. If you live outside of the [place], see the table (table 1).  In general, people who do not already have heart disease should aim for:   Total cholesterol below 200   LDL cholesterol below 130, or much lower if they are at risk of heart attack or stroke   HDL cholesterol above 60   Non-HDL cholesterol below 160, or lower if they are at risk of heart attack or stroke   Triglycerides below 150  Remember, though, that many people who cannot meet these goals still have a low " "risk of heart attack and stroke.  What should I do if I have high cholesterol? -- Ask your doctor what your overall risk of heart attack and stroke is. Just having high cholesterol is not always a reason to worry. Having high cholesterol is just one of many things that can increase your risk of heart attack and stroke.  Other things that increase your risk include:   Smoking   High blood pressure   Having a parent or sibling who got heart disease at a young age - Young, in this case, means younger than 55 for males and younger than 65 for females.   A diet that is not heart healthy - A \"heart-healthy\" diet includes lots of fruits and vegetables, fiber, and healthy fats (like those found in fish, nuts, and certain oils). It also means limiting sugar and unhealthy fats.   Older age  If you are at high risk of heart attack and stroke, having high cholesterol is a problem. But if you are at low risk, high cholesterol might not need treatment.  Should I take medicine to lower cholesterol? -- Not everyone who has high cholesterol needs medicines. Your doctor or nurse will decide if you need them based on your age, family history, and other health concerns.  There are many different medicines used to lower cholesterol (table 2). Some help your body make less cholesterol. Some keep your body from absorbing cholesterol from foods. Some help your body get rid of cholesterol faster. The medicines most often used to treat high cholesterol are called \"statins.\"  You should probably take a statin if you:   Already had a heart attack or stroke   Have known heart disease   Have diabetes   Have a condition called \"peripheral artery disease,\" which makes it painful to walk, and happens when the arteries in your legs get clogged with fatty deposits   Have an \"abdominal aortic aneurysm,\" which is a widening of the main artery in the belly  Most people with any of the conditions listed above should take a statin no matter what their " "cholesterol level is. If your doctor or nurse prescribes a statin, it's important to keep taking it. The medicine might not make you feel any different. But it can help prevent heart attack, stroke, and death.  If your doctor or nurse recommends taking medicine to help lower your cholesterol, make sure that you know what it is called. Follow all the instructions for how to take it. For example, some medicines work better when you take them in the evening. Some need to be taken with food.  Tell your doctor or nurse if your medicine causes any side effects that bother you. They might be able to switch you to a different medicine.  Can I lower my cholesterol without medicines? -- Yes. You can help lower your cholesterol by doing these things:   You can lower your LDL, or \"bad,\" cholesterol by avoiding red meat, butter, fried foods, cheese, and other foods that have a lot of saturated fat.   You can lower triglycerides by avoiding sugary foods, fried foods, and excess alcohol.   If you have excess weight, it can help to lose weight. Your doctor or nurse can help you do this in a healthy way.   Try to get regular physical activity. Even gentle forms of exercise, like walking, are good for your health.  Even if these steps don't change your cholesterol very much, they can improve your health in many other ways.  All topics are updated as new evidence becomes available and our peer review process is complete.  This topic retrieved from "[employer] on: Mar 01, 2024.  Topic 31033 Version 25.0  Release: 32.2.4 - C32.59  © 2024 UpToDate, Inc. and/or its affiliates. All rights reserved.  table 1: Cholesterol and triglyceride measurements in the [place] and elsewhere     Measurement used within the [place] Milligrams/deciliter (mg/dL)  Measurement used most places outside of the [place] Millimoles/liter (mmol/Liter)     Level to aim for  Level to aim for    Total cholesterol  Below 200 Below 5.17   LDL cholesterol  Below 130, or much lower if at " risk of heart attack and stroke Below 3.36, or much lower if at risk of heart attack and stroke   HDL cholesterol  Above 60 Above 1.55   Triglycerides  Below 150 Below 1.7   Cholesterol is measured differently in the [place] than it is in most other countries. This table shows values used within and outside of the [place]. It includes the cholesterol and triglyceride levels that most people who do not have heart disease should aim for.  Graphic 24447 Version 5.0  table 2: Lipid-lowering medicines  Generic name  Brand name    Statins    Atorvastatin Lipitor   Fluvastatin Lescol, Lescol XL   Lovastatin Mevacor, Altoprev   Pitavastatin Livalo   Pravastatin Pravachol   Rosuvastatin Crestor   Simvastatin Zocor   PCSK9 inhibitors    Alirocumab Praluent   Evolocumab Repatha, Repatha SureClick   Cholesterol absorption inhibitors    Ezetimibe Zetia   Bile acid sequestrants    Cholestyramine Prevalite, Questran, Questran Light   Colesevelam Welchol   Colestipol Colestid   Niacin (nicotinic acid)    Niacin immediate release     Niacin extended release Niaspan   Fibrates    Fenofibrate Fenoglide, Tricor, Triglide, others   Gemfibrozil Lopid   Brand names listed are for medicines available in the [place] and some other countries.  Graphic 12053 Version 7.0  Consumer Information Use and Disclaimer   Disclaimer: This generalized information is a limited summary of diagnosis, treatment, and/or medication information. It is not meant to be comprehensive and should be used as a tool to help the user understand and/or assess potential diagnostic and treatment options. It does NOT include all information about conditions, treatments, medications, side effects, or risks that may apply to a specific patient. It is not intended to be medical advice or a substitute for the medical advice, diagnosis, or treatment of a health care provider based on the health care provider's examination and assessment of a patient's specific and unique circumstances.  Patients must speak with a health care provider for complete information about their health, medical questions, and treatment options, including any risks or benefits regarding use of medications. This information does not endorse any treatments or medications as safe, effective, or approved for treating a specific patient. UpToDate, Inc. and its affiliates disclaim any warranty or liability relating to this information or the use thereof.The use of this information is governed by the Terms of Use, available at https://www.woltersEntia Biosciencesuwer.com/en/know/clinical-effectiveness-terms. 2024© UpToDate, Inc. and its affiliates and/or licensors. All rights reserved.  Copyright   © 2024 UpToDate, Inc. and/or its affiliates. All rights reserved.

## 2024-09-18 NOTE — ASSESSMENT & PLAN NOTE
Orders:    CBC and differential; Future    Comprehensive metabolic panel; Future    Lipid Panel with Direct LDL reflex; Future

## 2024-09-18 NOTE — PROGRESS NOTES
Ambulatory Visit  Name: Regino Mitchell      : 1950      MRN: [id number]  Encounter Provider: Reg Palomares DO  Encounter Date: 2024   Encounter department: [facility]    Assessment & Plan  Mixed hyperlipidemia    Orders:    Comprehensive metabolic panel; Future    Lipid Panel with Direct LDL reflex; Future    TSH, 3rd generation; Future    Prediabetes    Orders:    Hemoglobin A1C; Future    TSH, 3rd generation; Future    Vitamin D deficiency    Orders:    Vitamin D 25 hydroxy; Future    Hyperbilirubinemia         Abnormal thyroid blood test    Orders:    TSH, 3rd generation; Future    Platelets decreased (HCC)    Orders:    CBC and differential; Future    Peripheral vascular disease (HCC)    Orders:    CBC and differential; Future    Comprehensive metabolic panel; Future    Lipid Panel with Direct LDL reflex; Future    Screening for prostate cancer    Orders:    PSA, Total Screen; Future  A/P: Doing ok and will check labs. Discussed vaccines and defers all. Appreciate DPM input. Keep f/u for further toenail treatment. Continue current treatment and RTC six months for routine.      History of Present Illness     WM RTC for f/u HLD, sz, etc. Doing ok and no new issues. Seen by DPM and toenail removal performed. Remains active w/o difficulty and no falls. No sz activity to report. No bleeding or bruising issues. Due for labs and vaccines.           Review of Systems   Constitutional:  Negative for activity change, chills, diaphoresis, fatigue and fever.   HENT: Negative.     Eyes:  Negative for visual disturbance.   Respiratory:  Negative for cough, chest tightness, shortness of breath and wheezing.    Cardiovascular:  Negative for chest pain, palpitations and leg swelling.   Gastrointestinal:  Negative for abdominal pain, constipation, diarrhea, nausea and vomiting.   Endocrine: Negative for cold intolerance and heat intolerance.   Genitourinary:  Negative for difficulty urinating,  "dysuria and frequency.   Musculoskeletal:  Negative for arthralgias, gait problem and myalgias.   Neurological:  Negative for dizziness, seizures, syncope, weakness, light-headedness and headaches.   Psychiatric/Behavioral:  Negative for confusion, dysphoric mood and sleep disturbance. The patient is not nervous/anxious.            Objective     /82   Pulse 57   Temp (!) 97.4 °F (36.3 °C)   Ht 5' 5\" (1.651 m)   Wt 70 kg (154 lb 6.4 oz)   SpO2 99%   BMI 25.69 kg/m²     Physical Exam  Vitals and nursing note reviewed.   Constitutional:       General: He is not in acute distress.     Appearance: Normal appearance. He is not ill-appearing.   HENT:      Head: Normocephalic and atraumatic.      Mouth/Throat:      Mouth: Mucous membranes are moist.   Eyes:      Extraocular Movements: Extraocular movements intact.      Conjunctiva/sclera: Conjunctivae normal.      Pupils: Pupils are equal, round, and reactive to light.   Neck:      Vascular: No carotid bruit.   Cardiovascular:      Rate and Rhythm: Normal rate and regular rhythm.      Heart sounds: Normal heart sounds. No murmur heard.  Pulmonary:      Effort: Pulmonary effort is normal. No respiratory distress.      Breath sounds: Normal breath sounds. No wheezing, rhonchi or rales.   Abdominal:      General: Bowel sounds are normal. There is no distension.      Palpations: Abdomen is soft.      Tenderness: There is no abdominal tenderness.   Musculoskeletal:      Cervical back: Neck supple.      Right lower leg: No edema.      Left lower leg: No edema.   Neurological:      General: No focal deficit present.      Mental Status: He is alert and oriented to person, place, and time. Mental status is at baseline.   Psychiatric:         Mood and Affect: Mood normal.         Behavior: Behavior normal.         Thought Content: Thought content normal.         Judgment: Judgment normal.         "

## 2024-09-19 ENCOUNTER — TELEPHONE (OUTPATIENT)
Dept: INTERNAL MEDICINE CLINIC | Facility: CLINIC | Age: 74
End: 2024-09-19

## 2024-09-19 ENCOUNTER — APPOINTMENT (OUTPATIENT)
Dept: LAB | Facility: CLINIC | Age: 74
End: 2024-09-19
Payer: MEDICARE

## 2024-09-19 DIAGNOSIS — I73.9 PERIPHERAL VASCULAR DISEASE (HCC): ICD-10-CM

## 2024-09-19 DIAGNOSIS — E78.2 MIXED HYPERLIPIDEMIA: ICD-10-CM

## 2024-09-19 DIAGNOSIS — R79.89 ABNORMAL THYROID BLOOD TEST: ICD-10-CM

## 2024-09-19 DIAGNOSIS — R73.03 PREDIABETES: ICD-10-CM

## 2024-09-19 DIAGNOSIS — E55.9 VITAMIN D DEFICIENCY: ICD-10-CM

## 2024-09-19 DIAGNOSIS — Z12.5 SCREENING FOR PROSTATE CANCER: ICD-10-CM

## 2024-09-19 DIAGNOSIS — D69.6 PLATELETS DECREASED (HCC): ICD-10-CM

## 2024-09-19 LAB
25(OH)D3 SERPL-MCNC: 56.3 NG/ML (ref 30–100)
ALBUMIN SERPL BCG-MCNC: 4.2 G/DL (ref 3.5–5)
ALP SERPL-CCNC: 40 U/L (ref 34–104)
ALT SERPL W P-5'-P-CCNC: 14 U/L (ref 7–52)
ANION GAP SERPL CALCULATED.3IONS-SCNC: 9 MMOL/L (ref 4–13)
AST SERPL W P-5'-P-CCNC: 16 U/L (ref 13–39)
BASOPHILS # BLD AUTO: 0.02 THOUSANDS/ΜL (ref 0–0.1)
BASOPHILS NFR BLD AUTO: 0 % (ref 0–1)
BILIRUB SERPL-MCNC: 0.92 MG/DL (ref 0.2–1)
BUN SERPL-MCNC: 12 MG/DL (ref 5–25)
CALCIUM SERPL-MCNC: 9.3 MG/DL (ref 8.4–10.2)
CHLORIDE SERPL-SCNC: 104 MMOL/L (ref 96–108)
CHOLEST SERPL-MCNC: 151 MG/DL
CO2 SERPL-SCNC: 29 MMOL/L (ref 21–32)
CREAT SERPL-MCNC: 0.87 MG/DL (ref 0.6–1.3)
EOSINOPHIL # BLD AUTO: 0.12 THOUSAND/ΜL (ref 0–0.61)
EOSINOPHIL NFR BLD AUTO: 2 % (ref 0–6)
ERYTHROCYTE [DISTWIDTH] IN BLOOD BY AUTOMATED COUNT: 13.1 % (ref 11.6–15.1)
EST. AVERAGE GLUCOSE BLD GHB EST-MCNC: 117 MG/DL
GFR SERPL CREATININE-BSD FRML MDRD: 85 ML/MIN/1.73SQ M
GLUCOSE P FAST SERPL-MCNC: 88 MG/DL (ref 65–99)
HBA1C MFR BLD: 5.7 %
HCT VFR BLD AUTO: 41.8 % (ref 36.5–49.3)
HDLC SERPL-MCNC: 54 MG/DL
HGB BLD-MCNC: 13.7 G/DL (ref 12–17)
IMM GRANULOCYTES # BLD AUTO: 0.02 THOUSAND/UL (ref 0–0.2)
IMM GRANULOCYTES NFR BLD AUTO: 0 % (ref 0–2)
LDLC SERPL CALC-MCNC: 70 MG/DL (ref 0–100)
LYMPHOCYTES # BLD AUTO: 2.95 THOUSANDS/ΜL (ref 0.6–4.47)
LYMPHOCYTES NFR BLD AUTO: 53 % (ref 14–44)
MCH RBC QN AUTO: 30.9 PG (ref 26.8–34.3)
MCHC RBC AUTO-ENTMCNC: 32.8 G/DL (ref 31.4–37.4)
MCV RBC AUTO: 94 FL (ref 82–98)
MONOCYTES # BLD AUTO: 0.53 THOUSAND/ΜL (ref 0.17–1.22)
MONOCYTES NFR BLD AUTO: 9 % (ref 4–12)
NEUTROPHILS # BLD AUTO: 2.07 THOUSANDS/ΜL (ref 1.85–7.62)
NEUTS SEG NFR BLD AUTO: 36 % (ref 43–75)
NRBC BLD AUTO-RTO: 0 /100 WBCS
PLATELET # BLD AUTO: 128 THOUSANDS/UL (ref 149–390)
PMV BLD AUTO: 11.8 FL (ref 8.9–12.7)
POTASSIUM SERPL-SCNC: 3.8 MMOL/L (ref 3.5–5.3)
PROT SERPL-MCNC: 6.5 G/DL (ref 6.4–8.4)
PSA SERPL-MCNC: 0.69 NG/ML (ref 0–4)
RBC # BLD AUTO: 4.43 MILLION/UL (ref 3.88–5.62)
SODIUM SERPL-SCNC: 142 MMOL/L (ref 135–147)
TRIGL SERPL-MCNC: 133 MG/DL
TSH SERPL DL<=0.05 MIU/L-ACNC: 3.4 UIU/ML (ref 0.45–4.5)
WBC # BLD AUTO: 5.71 THOUSAND/UL (ref 4.31–10.16)

## 2024-09-19 PROCEDURE — 83036 HEMOGLOBIN GLYCOSYLATED A1C: CPT

## 2024-09-19 PROCEDURE — G0103 PSA SCREENING: HCPCS

## 2024-09-19 PROCEDURE — 80061 LIPID PANEL: CPT

## 2024-09-19 PROCEDURE — 84443 ASSAY THYROID STIM HORMONE: CPT

## 2024-09-19 PROCEDURE — 80053 COMPREHEN METABOLIC PANEL: CPT

## 2024-09-19 PROCEDURE — 36415 COLL VENOUS BLD VENIPUNCTURE: CPT

## 2024-09-19 PROCEDURE — 82306 VITAMIN D 25 HYDROXY: CPT

## 2024-09-19 PROCEDURE — 85025 COMPLETE CBC W/AUTO DIFF WBC: CPT

## 2024-09-20 ENCOUNTER — TELEPHONE (OUTPATIENT)
Dept: INTERNAL MEDICINE CLINIC | Facility: CLINIC | Age: 74
End: 2024-09-20

## 2024-10-03 ENCOUNTER — OFFICE VISIT (OUTPATIENT)
Dept: PODIATRY | Facility: CLINIC | Age: 74
End: 2024-10-03
Payer: MEDICARE

## 2024-10-03 VITALS
DIASTOLIC BLOOD PRESSURE: 94 MMHG | SYSTOLIC BLOOD PRESSURE: 168 MMHG | WEIGHT: 154 LBS | HEART RATE: 76 BPM | BODY MASS INDEX: 25.63 KG/M2

## 2024-10-03 DIAGNOSIS — L97.521 ULCER OF TOE OF LEFT FOOT, LIMITED TO BREAKDOWN OF SKIN (HCC): Primary | ICD-10-CM

## 2024-10-03 PROCEDURE — 99212 OFFICE O/P EST SF 10 MIN: CPT | Performed by: PODIATRIST

## 2024-10-03 NOTE — PROGRESS NOTES
Ambulatory Visit  Name: Regino Mitchell      : 1950      MRN: [id number]  Encounter Provider: Hugh Dudley DPM  Encounter Date: 10/3/2024   Encounter department: [facility] [place]    Assessment & Plan  Ulcer of toe of left foot, limited to breakdown of skin (HCC)         -He is having some continued pain in the area he is leaving for a cruise at the end of the month.  - At this point due to the slow healing nature of the right hallux we will avoid intervention on the left hallux permanently until after his cruise  - He is to return in 2 weeks to check the right hallux and we will also trim the left hallux nail to allow to be more palatable for him while he is on his cruise  - He is to continue soaking continue Band-Aid continue antibiotic ointment    History of Present Illness     Regino Mitchell is a 74 y.o. male who presents  for evaluation and management of his right hallux, having some pain. Leaving for cruise on the . Having some pain, soaking it.       Review of Systems   Constitutional:  Negative for chills and fever.   HENT:  Negative for ear pain and sore throat.    Eyes:  Negative for pain and visual disturbance.   Respiratory:  Negative for cough and shortness of breath.    Cardiovascular:  Negative for chest pain and palpitations.   Gastrointestinal:  Negative for abdominal pain and vomiting.   Genitourinary:  Negative for dysuria and hematuria.   Musculoskeletal:  Negative for arthralgias and back pain.   Skin:  Negative for color change and rash.   Neurological:  Negative for seizures and syncope.   All other systems reviewed and are negative.          Objective     /94 (BP Location: Left arm, Patient Position: Sitting, Cuff Size: Standard)   Pulse 76   Wt 69.9 kg (154 lb)   BMI 25.63 kg/m²     Physical Exam  Skin:     Comments: Half gabriella the nail bed is healed, normal appearance, but slow healing.

## 2024-10-10 DIAGNOSIS — E03.9 ACQUIRED HYPOTHYROIDISM: ICD-10-CM

## 2024-10-10 RX ORDER — LEVOTHYROXINE SODIUM 100 UG/1
100 TABLET ORAL
Qty: 90 TABLET | Refills: 1 | Status: SHIPPED | OUTPATIENT
Start: 2024-10-10

## 2024-10-17 ENCOUNTER — OFFICE VISIT (OUTPATIENT)
Dept: PODIATRY | Facility: CLINIC | Age: 74
End: 2024-10-17
Payer: MEDICARE

## 2024-10-17 VITALS
SYSTOLIC BLOOD PRESSURE: 144 MMHG | BODY MASS INDEX: 25.66 KG/M2 | DIASTOLIC BLOOD PRESSURE: 82 MMHG | WEIGHT: 154 LBS | HEART RATE: 93 BPM | HEIGHT: 65 IN

## 2024-10-17 DIAGNOSIS — I73.9 PERIPHERAL VASCULAR DISEASE (HCC): ICD-10-CM

## 2024-10-17 DIAGNOSIS — B35.1 ONYCHOMYCOSIS: ICD-10-CM

## 2024-10-17 DIAGNOSIS — L60.3 NAIL DYSTROPHY: ICD-10-CM

## 2024-10-17 DIAGNOSIS — L97.521 ULCER OF TOE OF LEFT FOOT, LIMITED TO BREAKDOWN OF SKIN (HCC): Primary | ICD-10-CM

## 2024-10-17 PROCEDURE — 99213 OFFICE O/P EST LOW 20 MIN: CPT | Performed by: PODIATRIST

## 2024-10-17 PROCEDURE — 11720 DEBRIDE NAIL 1-5: CPT | Performed by: PODIATRIST

## 2024-10-17 NOTE — PROGRESS NOTES
Ambulatory Visit  Name: Regino Mitchell      : 1950      MRN: [id number]  Encounter Provider: Hugh Dudley DPM  Encounter Date: 10/17/2024   Encounter department: [facility] [place]    Assessment & Plan  Ulcer of toe of left foot, limited to breakdown of skin (HCC)         Onychomycosis         Nail dystrophy         Peripheral vascular disease (HCC)         -Continue coverage with Band-Aid for now  - Advise no salt water or swimming if this area is open  - She should be okay with the great that this is healing by the time he goes on his cruise  - Near care provided as below and will consider permanent left hallux nail removal when he returns        Procedure: All mycotic toenails were reduced and debrided in length, width, and girth using a nail nipper and dremel.  All hyperkeratotic skin lesion(s) were sharply pared with a scalpel with no bleeding or evidence of ulceration.  Patient tolerated procedure(s) well without complications.  52501, q8      History of Present Illness     Regino Mitchell is a 74 y.o. male who presents evaluation management his bilateral feet, he is complaining continued pain on the right hallux, mild drainage, still soaking 1 time a day also here for at risk footcare and going on a cruise at the end of the month      Review of Systems   Constitutional:  Negative for chills and fever.   HENT:  Negative for ear pain and sore throat.    Eyes:  Negative for pain and visual disturbance.   Respiratory:  Negative for cough and shortness of breath.    Cardiovascular:  Negative for chest pain and palpitations.   Gastrointestinal:  Negative for abdominal pain and vomiting.   Genitourinary:  Negative for dysuria and hematuria.   Musculoskeletal:  Negative for arthralgias and back pain.   Skin:  Negative for color change and rash.   Neurological:  Negative for seizures and syncope.   All other systems reviewed and are negative.          Objective     /82 (BP  "Location: Left arm, Patient Position: Sitting, Cuff Size: Standard)   Pulse 93   Ht 5' 5\" (1.651 m)   Wt 69.9 kg (154 lb)   BMI 25.63 kg/m²     Physical Exam  Skin:     Comments: There is no visible open lesions but small amount of drainage via tissue test on the medial proximal corner of the right hallux nail plate.  Remainder of the nails are elongated, there are fungal changes to the left hallux left fifth toe right third fourth and fifth         "

## 2024-11-14 DIAGNOSIS — R20.2 PARESTHESIA AND PAIN OF BOTH UPPER EXTREMITIES: ICD-10-CM

## 2024-11-14 DIAGNOSIS — M79.601 PARESTHESIA AND PAIN OF BOTH UPPER EXTREMITIES: ICD-10-CM

## 2024-11-14 DIAGNOSIS — M79.602 PARESTHESIA AND PAIN OF BOTH UPPER EXTREMITIES: ICD-10-CM

## 2024-11-14 RX ORDER — MELOXICAM 15 MG/1
15 TABLET ORAL DAILY
Qty: 30 TABLET | Refills: 2 | Status: SHIPPED | OUTPATIENT
Start: 2024-11-14

## 2025-01-13 DIAGNOSIS — R56.9 SEIZURE (HCC): ICD-10-CM

## 2025-01-14 RX ORDER — DIVALPROEX SODIUM 250 MG/1
TABLET, DELAYED RELEASE ORAL
Qty: 90 TABLET | Refills: 5 | Status: SHIPPED | OUTPATIENT
Start: 2025-01-14 | End: 2025-01-17

## 2025-01-17 DIAGNOSIS — R56.9 SEIZURE (HCC): ICD-10-CM

## 2025-01-17 RX ORDER — DIVALPROEX SODIUM 250 MG/1
TABLET, DELAYED RELEASE ORAL
Qty: 270 TABLET | Refills: 1 | Status: SHIPPED | OUTPATIENT
Start: 2025-01-17

## 2025-02-13 DIAGNOSIS — E78.3 MIXED HYPERGLYCERIDEMIA: ICD-10-CM

## 2025-02-13 RX ORDER — ATORVASTATIN CALCIUM 10 MG/1
10 TABLET, FILM COATED ORAL DAILY
Qty: 90 TABLET | Refills: 1 | Status: SHIPPED | OUTPATIENT
Start: 2025-02-13

## 2025-03-19 ENCOUNTER — APPOINTMENT (OUTPATIENT)
Dept: LAB | Facility: CLINIC | Age: 75
End: 2025-03-19
Payer: MEDICARE

## 2025-03-19 ENCOUNTER — OFFICE VISIT (OUTPATIENT)
Dept: INTERNAL MEDICINE CLINIC | Facility: CLINIC | Age: 75
End: 2025-03-19
Payer: MEDICARE

## 2025-03-19 ENCOUNTER — APPOINTMENT (OUTPATIENT)
Dept: RADIOLOGY | Facility: CLINIC | Age: 75
End: 2025-03-19
Payer: MEDICARE

## 2025-03-19 VITALS
OXYGEN SATURATION: 96 % | SYSTOLIC BLOOD PRESSURE: 152 MMHG | TEMPERATURE: 99.5 F | HEART RATE: 100 BPM | WEIGHT: 148 LBS | BODY MASS INDEX: 24.66 KG/M2 | HEIGHT: 65 IN | DIASTOLIC BLOOD PRESSURE: 92 MMHG

## 2025-03-19 DIAGNOSIS — J06.9 UPPER RESPIRATORY TRACT INFECTION, UNSPECIFIED TYPE: ICD-10-CM

## 2025-03-19 DIAGNOSIS — R29.6 FALLS: ICD-10-CM

## 2025-03-19 DIAGNOSIS — R35.0 FREQUENT URINATION: ICD-10-CM

## 2025-03-19 DIAGNOSIS — F32.1 CURRENT MODERATE EPISODE OF MAJOR DEPRESSIVE DISORDER WITHOUT PRIOR EPISODE (HCC): ICD-10-CM

## 2025-03-19 DIAGNOSIS — D69.6 PLATELETS DECREASED (HCC): ICD-10-CM

## 2025-03-19 DIAGNOSIS — M25.562 CHRONIC PAIN OF BOTH KNEES: ICD-10-CM

## 2025-03-19 DIAGNOSIS — Z63.4 BEREAVEMENT: ICD-10-CM

## 2025-03-19 DIAGNOSIS — G89.29 CHRONIC PAIN OF BOTH KNEES: ICD-10-CM

## 2025-03-19 DIAGNOSIS — R73.03 PREDIABETES: ICD-10-CM

## 2025-03-19 DIAGNOSIS — F43.21 COMPLICATED GRIEVING: ICD-10-CM

## 2025-03-19 DIAGNOSIS — M25.561 CHRONIC PAIN OF BOTH KNEES: ICD-10-CM

## 2025-03-19 DIAGNOSIS — R26.9 GAIT ABNORMALITY: ICD-10-CM

## 2025-03-19 DIAGNOSIS — R56.9 SEIZURE (HCC): ICD-10-CM

## 2025-03-19 DIAGNOSIS — Z23 ENCOUNTER FOR IMMUNIZATION: ICD-10-CM

## 2025-03-19 DIAGNOSIS — I73.9 PERIPHERAL VASCULAR DISEASE (HCC): ICD-10-CM

## 2025-03-19 DIAGNOSIS — E55.9 VITAMIN D DEFICIENCY: ICD-10-CM

## 2025-03-19 DIAGNOSIS — E78.2 MIXED HYPERLIPIDEMIA: ICD-10-CM

## 2025-03-19 DIAGNOSIS — E78.2 MIXED HYPERLIPIDEMIA: Primary | ICD-10-CM

## 2025-03-19 LAB
ALBUMIN SERPL BCG-MCNC: 4.6 G/DL (ref 3.5–5)
ALP SERPL-CCNC: 52 U/L (ref 34–104)
ALT SERPL W P-5'-P-CCNC: 13 U/L (ref 7–52)
ANION GAP SERPL CALCULATED.3IONS-SCNC: 12 MMOL/L (ref 4–13)
AST SERPL W P-5'-P-CCNC: 18 U/L (ref 13–39)
BASOPHILS # BLD AUTO: 0.02 THOUSANDS/ÂΜL (ref 0–0.1)
BASOPHILS NFR BLD AUTO: 0 % (ref 0–1)
BILIRUB SERPL-MCNC: 1.22 MG/DL (ref 0.2–1)
BUN SERPL-MCNC: 13 MG/DL (ref 5–25)
CALCIUM SERPL-MCNC: 9.8 MG/DL (ref 8.4–10.2)
CHLORIDE SERPL-SCNC: 100 MMOL/L (ref 96–108)
CO2 SERPL-SCNC: 29 MMOL/L (ref 21–32)
CREAT SERPL-MCNC: 0.84 MG/DL (ref 0.6–1.3)
EOSINOPHIL # BLD AUTO: 0.03 THOUSAND/ÂΜL (ref 0–0.61)
EOSINOPHIL NFR BLD AUTO: 0 % (ref 0–6)
ERYTHROCYTE [DISTWIDTH] IN BLOOD BY AUTOMATED COUNT: 13 % (ref 11.6–15.1)
GFR SERPL CREATININE-BSD FRML MDRD: 86 ML/MIN/1.73SQ M
GLUCOSE P FAST SERPL-MCNC: 96 MG/DL (ref 65–99)
HCT VFR BLD AUTO: 45.6 % (ref 36.5–49.3)
HGB BLD-MCNC: 14.6 G/DL (ref 12–17)
IMM GRANULOCYTES # BLD AUTO: 0.03 THOUSAND/UL (ref 0–0.2)
IMM GRANULOCYTES NFR BLD AUTO: 0 % (ref 0–2)
LDLC SERPL DIRECT ASSAY-MCNC: 85 MG/DL (ref 0–100)
LYMPHOCYTES # BLD AUTO: 3.57 THOUSANDS/ÂΜL (ref 0.6–4.47)
LYMPHOCYTES NFR BLD AUTO: 34 % (ref 14–44)
MCH RBC QN AUTO: 29.9 PG (ref 26.8–34.3)
MCHC RBC AUTO-ENTMCNC: 32 G/DL (ref 31.4–37.4)
MCV RBC AUTO: 93 FL (ref 82–98)
MONOCYTES # BLD AUTO: 1.1 THOUSAND/ÂΜL (ref 0.17–1.22)
MONOCYTES NFR BLD AUTO: 11 % (ref 4–12)
NEUTROPHILS # BLD AUTO: 5.62 THOUSANDS/ÂΜL (ref 1.85–7.62)
NEUTS SEG NFR BLD AUTO: 55 % (ref 43–75)
NRBC BLD AUTO-RTO: 0 /100 WBCS
PLATELET # BLD AUTO: 137 THOUSANDS/UL (ref 149–390)
PMV BLD AUTO: 11.2 FL (ref 8.9–12.7)
POTASSIUM SERPL-SCNC: 3.8 MMOL/L (ref 3.5–5.3)
PROT SERPL-MCNC: 7.3 G/DL (ref 6.4–8.4)
RBC # BLD AUTO: 4.88 MILLION/UL (ref 3.88–5.62)
SODIUM SERPL-SCNC: 141 MMOL/L (ref 135–147)
TRIGL SERPL-MCNC: 132 MG/DL (ref ?–150)
TSH SERPL DL<=0.05 MIU/L-ACNC: 0.67 UIU/ML (ref 0.45–4.5)
VALPROATE SERPL-MCNC: 104 UG/ML (ref 50–100)
WBC # BLD AUTO: 10.37 THOUSAND/UL (ref 4.31–10.16)

## 2025-03-19 PROCEDURE — 71046 X-RAY EXAM CHEST 2 VIEWS: CPT

## 2025-03-19 PROCEDURE — 85025 COMPLETE CBC W/AUTO DIFF WBC: CPT

## 2025-03-19 PROCEDURE — 84443 ASSAY THYROID STIM HORMONE: CPT

## 2025-03-19 PROCEDURE — G2211 COMPLEX E/M VISIT ADD ON: HCPCS | Performed by: INTERNAL MEDICINE

## 2025-03-19 PROCEDURE — 80053 COMPREHEN METABOLIC PANEL: CPT

## 2025-03-19 PROCEDURE — 36415 COLL VENOUS BLD VENIPUNCTURE: CPT

## 2025-03-19 PROCEDURE — 80164 ASSAY DIPROPYLACETIC ACD TOT: CPT

## 2025-03-19 PROCEDURE — 99214 OFFICE O/P EST MOD 30 MIN: CPT | Performed by: INTERNAL MEDICINE

## 2025-03-19 PROCEDURE — 83721 ASSAY OF BLOOD LIPOPROTEIN: CPT

## 2025-03-19 PROCEDURE — 84478 ASSAY OF TRIGLYCERIDES: CPT

## 2025-03-19 PROCEDURE — 73562 X-RAY EXAM OF KNEE 3: CPT

## 2025-03-19 RX ORDER — FLUTICASONE PROPIONATE 50 MCG
1 SPRAY, SUSPENSION (ML) NASAL DAILY
Qty: 16 G | Refills: 0 | Status: SHIPPED | OUTPATIENT
Start: 2025-03-19

## 2025-03-19 RX ORDER — BENZONATATE 200 MG/1
200 CAPSULE ORAL 3 TIMES DAILY PRN
Qty: 20 CAPSULE | Refills: 0 | Status: SHIPPED | OUTPATIENT
Start: 2025-03-19

## 2025-03-19 RX ORDER — GUAIFENESIN 600 MG/1
600 TABLET, EXTENDED RELEASE ORAL EVERY 12 HOURS SCHEDULED
Qty: 20 TABLET | Refills: 0 | Status: SHIPPED | OUTPATIENT
Start: 2025-03-19

## 2025-03-19 RX ORDER — ESCITALOPRAM OXALATE 10 MG/1
10 TABLET ORAL DAILY
Qty: 90 TABLET | Refills: 3 | Status: SHIPPED | OUTPATIENT
Start: 2025-03-19 | End: 2026-03-14

## 2025-03-19 RX ORDER — AZITHROMYCIN 250 MG/1
TABLET, FILM COATED ORAL
Qty: 6 TABLET | Refills: 0 | Status: SHIPPED | OUTPATIENT
Start: 2025-03-19 | End: 2025-03-24

## 2025-03-19 SDOH — SOCIAL STABILITY - SOCIAL INSECURITY: DISSAPEARANCE AND DEATH OF FAMILY MEMBER: Z63.4

## 2025-03-19 NOTE — PROGRESS NOTES
Name: Regino Mitchell      : 1950      MRN: [id number]  Encounter Provider: Reg Palomares DO  Encounter Date: 3/19/2025   Encounter department: [facility]  :  Assessment & Plan  Encounter for immunization         Mixed hyperlipidemia    Orders:    Comprehensive metabolic panel; Future    LDL cholesterol, direct; Future    Triglycerides; Future    Prediabetes    Orders:    Comprehensive metabolic panel; Future    Albumin / creatinine urine ratio; Future    Vitamin D deficiency         Seizure (HCC)    Orders:    Valproic acid level, total; Future    CBC and differential; Future    Platelets decreased (HCC)         Peripheral vascular disease (HCC)         Falls    Orders:    XR knee 3 vw right non injury; Future    XR knee 3 vw left non injury; Future    Ambulatory referral to social work care management program; Future    Ambulatory referral to Physical Therapy; Future    Bereavement    Orders:    Ambulatory referral to social work care management program; Future    escitalopram (LEXAPRO) 10 mg tablet; Take 1 tablet (10 mg total) by mouth daily    Complicated grieving    Orders:    Ambulatory referral to social work care management program; Future    escitalopram (LEXAPRO) 10 mg tablet; Take 1 tablet (10 mg total) by mouth daily    Upper respiratory tract infection, unspecified type    Orders:    azithromycin (ZITHROMAX) 250 mg tablet; Take 2 tablets today then 1 tablet daily x 4 days    guaiFENesin (Mucinex) 600 mg 12 hr tablet; Take 1 tablet (600 mg total) by mouth every 12 (twelve) hours    fluticasone (FLONASE) 50 mcg/act nasal spray; 1 spray into each nostril daily    benzonatate (TESSALON) 200 MG capsule; Take 1 capsule (200 mg total) by mouth 3 (three) times a day as needed for cough    XR chest pa and lateral; Future    Chronic pain of both knees    Orders:    XR knee 3 vw right non injury; Future    XR knee 3 vw left non injury; Future    Ambulatory referral to Physical  Therapy; Future    Gait abnormality    Orders:    XR knee 3 vw right non injury; Future    XR knee 3 vw left non injury; Future    Ambulatory referral to social work care management program; Future    Ambulatory referral to Physical Therapy; Future    Frequent urination    Orders:    UA w Reflex to Microscopic w Reflex to Culture; Future    Current moderate episode of major depressive disorder without prior episode (HCC)  Depression Screening Follow-up Plan: Patient's depression screening was positive with a PHQ-2 score of 4. Their PHQ-9 score was 4. Patient assessed for underlying major depression. They have no active suicidal ideations. Brief counseling provided and recommend additional follow-up/re-evaluation next office visit.    Orders:    Ambulatory referral to social work care management program; Future    TSH, 3rd generation; Future    A/P: Doing ok and will check labs.Very MDD. Will start meds and get the LSW involved.  Discussed vaccines defers. Will treat his URI. Suspect DJD as the knee pain. Gait is very bad and high risk of falling. Trouble getting out of chair. Will check CXR and start PT. Check a CXR for the URI. Check a urine for the frequency. . Continue current treatment and RTC three weeks for f/u. .        History of Present Illness   WM RTC for f/u HLD, pre diabetes, etc. Doing very poorly since wife . Depressed and not interacting at all. Reports multiple falls due to worsening knee pain. NO swelling or illness. Notes recent URI as well with coughing No SOB.  No sz activity to report and no syncopal episodes. NO claudication. Due for labs and vaccines.       Review of Systems   Constitutional:  Positive for activity change, appetite change and fatigue. Negative for chills, diaphoresis and fever.   HENT:  Positive for congestion, postnasal drip, rhinorrhea and sore throat. Negative for ear discharge, ear pain, facial swelling, sinus pressure and sinus pain.    Eyes:  Negative for visual  "disturbance.   Respiratory:  Positive for cough. Negative for chest tightness, shortness of breath and wheezing.    Cardiovascular:  Negative for chest pain, palpitations and leg swelling.   Gastrointestinal:  Negative for abdominal pain, constipation, diarrhea, nausea and vomiting.   Endocrine: Negative for cold intolerance and heat intolerance.   Genitourinary:  Negative for difficulty urinating, dysuria and frequency.   Musculoskeletal:  Positive for arthralgias and gait problem. Negative for joint swelling and myalgias.   Neurological:  Negative for dizziness, seizures, syncope, weakness, light-headedness and headaches.   Psychiatric/Behavioral:  Positive for dysphoric mood. Negative for confusion, self-injury, sleep disturbance and suicidal ideas. The patient is not nervous/anxious.        Objective   /92 (Patient Position: Sitting, Cuff Size: Large)   Pulse 100   Temp 99.5 °F (37.5 °C) (Tympanic)   Ht 5' 5\" (1.651 m)   Wt 67.1 kg (148 lb)   SpO2 96%   BMI 24.63 kg/m²      Physical Exam  Vitals and nursing note reviewed.   Constitutional:       General: He is not in acute distress.     Appearance: Normal appearance. He is not ill-appearing.   HENT:      Head: Normocephalic and atraumatic.      Mouth/Throat:      Mouth: Mucous membranes are moist.   Eyes:      Extraocular Movements: Extraocular movements intact.      Conjunctiva/sclera: Conjunctivae normal.      Pupils: Pupils are equal, round, and reactive to light.   Neck:      Vascular: No carotid bruit.   Cardiovascular:      Rate and Rhythm: Normal rate and regular rhythm.      Heart sounds: Normal heart sounds. No murmur heard.  Pulmonary:      Effort: Pulmonary effort is normal. No respiratory distress.      Breath sounds: No wheezing, rhonchi or rales.      Comments: Coarse and decreased BS.   Abdominal:      General: Bowel sounds are normal. There is no distension.      Palpations: Abdomen is soft.      Tenderness: There is no abdominal " tenderness.   Musculoskeletal:         General: Tenderness present. No deformity.      Cervical back: Neck supple.      Right lower leg: No edema.      Left lower leg: No edema.      Comments: Bilat knee w/o any gross deformities, increase temp, erythema, or swelling. No effusion or ballotment. Some crepitus. Collaterals intact. Negative Drawer's. Negative Raghu's. ROM wnl. Tenderness diffusely. .      Neurological:      General: No focal deficit present.      Mental Status: He is alert and oriented to person, place, and time. Mental status is at baseline.   Psychiatric:         Mood and Affect: Mood normal.         Behavior: Behavior normal.         Thought Content: Thought content normal.         Judgment: Judgment normal.

## 2025-03-20 ENCOUNTER — RESULTS FOLLOW-UP (OUTPATIENT)
Dept: INTERNAL MEDICINE CLINIC | Facility: CLINIC | Age: 75
End: 2025-03-20

## 2025-03-20 ENCOUNTER — PATIENT OUTREACH (OUTPATIENT)
Dept: CASE MANAGEMENT | Facility: OTHER | Age: 75
End: 2025-03-20

## 2025-03-20 DIAGNOSIS — M25.562 CHRONIC PAIN OF BOTH KNEES: ICD-10-CM

## 2025-03-20 DIAGNOSIS — M25.561 CHRONIC PAIN OF BOTH KNEES: ICD-10-CM

## 2025-03-20 DIAGNOSIS — R26.9 GAIT ABNORMALITY: ICD-10-CM

## 2025-03-20 DIAGNOSIS — R29.6 FALLS: ICD-10-CM

## 2025-03-20 DIAGNOSIS — G89.29 CHRONIC PAIN OF BOTH KNEES: ICD-10-CM

## 2025-03-20 DIAGNOSIS — F43.21 COMPLICATED GRIEVING: ICD-10-CM

## 2025-03-20 DIAGNOSIS — Z63.4 BEREAVEMENT: Primary | ICD-10-CM

## 2025-03-20 SDOH — SOCIAL STABILITY - SOCIAL INSECURITY: DISSAPEARANCE AND DEATH OF FAMILY MEMBER: Z63.4

## 2025-03-20 NOTE — PROGRESS NOTES
OPCRACHEAL SONI received referral for patient for HHC and Bereavement resources.  SHANTEL SONI reviewed patient's chart and reached out regarding the above.  SHANTEL SONI left a VM

## 2025-03-20 NOTE — TELEPHONE ENCOUNTER
Pt's daughter called back today to discuss recent results, she is asking if she should take pt to ER?    She reports he is not doing good at home, not able to get out of bed, incontinent and daughter is suspecting possible dementia/memory issues. She is questioning if he is being forth coming at his appts with you, I advised if she is able to come to his f/u next week it would be beneficial. She is questioning if ER would be a good place to start or if we could see about pt getting set up for home health? She reports this has all been worsening over the past few months.

## 2025-03-21 ENCOUNTER — PATIENT OUTREACH (OUTPATIENT)
Dept: CASE MANAGEMENT | Facility: OTHER | Age: 75
End: 2025-03-21

## 2025-03-21 NOTE — RESULT ENCOUNTER NOTE
Notified daughter, she states she is going to try to get him to do PT next week, he is sick still so she is giving him a few days to recover. She states she is planning to come with to his appointment next month.

## 2025-03-24 ENCOUNTER — PATIENT OUTREACH (OUTPATIENT)
Dept: CASE MANAGEMENT | Facility: OTHER | Age: 75
End: 2025-03-24

## 2025-03-27 ENCOUNTER — PATIENT OUTREACH (OUTPATIENT)
Dept: CASE MANAGEMENT | Facility: OTHER | Age: 75
End: 2025-03-27

## 2025-03-27 NOTE — PROGRESS NOTES
OPCM SW attempted second outreach to patient regarding Bereavement and C resources.  OPCM ZACHARIAH left a VM and sent Utr letter to address on file

## 2025-04-09 ENCOUNTER — EVALUATION (OUTPATIENT)
Dept: PHYSICAL THERAPY | Facility: CLINIC | Age: 75
End: 2025-04-09
Payer: MEDICARE

## 2025-04-09 VITALS — SYSTOLIC BLOOD PRESSURE: 135 MMHG | HEART RATE: 58 BPM | DIASTOLIC BLOOD PRESSURE: 81 MMHG

## 2025-04-09 DIAGNOSIS — R29.6 FALLS: ICD-10-CM

## 2025-04-09 DIAGNOSIS — M25.562 CHRONIC PAIN OF BOTH KNEES: ICD-10-CM

## 2025-04-09 DIAGNOSIS — R26.9 GAIT ABNORMALITY: ICD-10-CM

## 2025-04-09 DIAGNOSIS — M25.561 CHRONIC PAIN OF BOTH KNEES: ICD-10-CM

## 2025-04-09 DIAGNOSIS — G89.29 CHRONIC PAIN OF BOTH KNEES: ICD-10-CM

## 2025-04-09 PROCEDURE — 97162 PT EVAL MOD COMPLEX 30 MIN: CPT | Performed by: PHYSICAL THERAPIST

## 2025-04-09 PROCEDURE — 97110 THERAPEUTIC EXERCISES: CPT | Performed by: PHYSICAL THERAPIST

## 2025-04-09 NOTE — PROGRESS NOTES
PT Evaluation     Today's date: 2025  Patient name: Regino Mitchell  : 1950  MRN: [id number]  Referring provider: Reg Palomares DO  Dx:   Encounter Diagnosis     ICD-10-CM    1. Falls  R29.6 Ambulatory referral to Physical Therapy      2. Chronic pain of both knees  M25.561 Ambulatory referral to Physical Therapy    M25.562     G89.29       3. Gait abnormality  R26.9 Ambulatory referral to Physical Therapy                     Assessment  Impairments: abnormal coordination, abnormal gait, abnormal muscle firing, abnormal muscle tone, abnormal or restricted ROM, abnormal movement, activity intolerance, impaired physical strength, lacks appropriate home exercise program, pain with function, safety issue, weight-bearing intolerance and poor posture   Functional limitations: difficulty with amb, stair negotiation  Symptom irritability: moderate    Assessment details: Regino Mitchell is a 75 y.o. male who presents to outpatient PT with a  Falls  Chronic pain of both knees  Gait abnormality.  No further referral appears necessary at this time based upon examination results. Pt presents with decreased strength, ROM, balance, functional activity tolerance, and pain with movement in his bilateral LE's  which is  limiting his ability to perform the aforementioned functional activities.  Etiologic factors include repetitive poor body mechanics. Prognosis is good given HEP compliance and PT 2-3/wk.  Please contact me if you have any questions or recommendations.  Thank you for the opportunity to share in  [facility] care.     Understanding of Dx/Px/POC: good     Prognosis: good    Goals  STG to be achieved in 4 weeks     1. Pt will reduce 5x STS score to 16 seconds indicating reduced risk for falls   2. Pt will improve 6 MWT score to 300 feet indicating improved endurance   3. Pt will improve MMT scores by at least 1/2 grade to promote improved functional activity tolerance      LTG to be achieved in 6-8 weeks   1. Pt  will be complaint with HEP   2. Pt will improve ROM to WFL, to help facilitate independence with ADL's, IADL's, and functional activities   3. Pt will improve Strength to WFL to help facilitate independence with ADL's, IADL's, and functional activities   4. Pt will have no limitations with ambulation to help facilitate independence at home and in the community.   5. Pt will have no limitations with stair negotiation to help facilitate independence at home and in the community           Plan  Patient would benefit from: skilled physical therapy    Planned therapy interventions: ADL training, balance, balance/weight bearing training, flexibility, functional ROM exercises, gait training, graded activity, graded exercise, graded motor, home exercise program, joint mobilization, manual therapy, neuromuscular re-education, patient education, postural training, strengthening, stretching, therapeutic activities and therapeutic exercise    Frequency: 2x week  Duration in weeks: 12  Plan of Care beginning date: 2025  Plan of Care expiration date: 2025  Treatment plan discussed with: patient, PTA and referring physician        Subjective Evaluation    History of Present Illness  Mechanism of injury: 75 year old male male presents to outpatient PT with a CC of generalized weakness of bilateral leg pain and generalized weakness.     Reports difficulty with walking, and can't walk as good as before    He does note that he has to use a walker on occasion    Goals: to get legs stronger.   Patient Goals  Patient goals for therapy: increased strength, decreased pain and increased motion  Patient goal: to have less pain and walk better  Pain  Current pain ratin  At best pain ratin  At worst pain ratin  Quality: dull ache  Relieving factors: medications  Aggravating factors: standing and walking  Progression: worsening          Objective     Active Range of Motion   Left Hip   Flexion: WFL  Abduction:  WFL  Adduction: WFL    Right Hip   Flexion: WFL  Abduction: WFL  Adduction: WFL  Left Knee   Flexion: WFL  Extension: WFL    Right Knee   Flexion: WFL  Extension: WFL  Left Ankle/Foot   Dorsiflexion (kf): WFL  Plantar flexion: WFL    Right Ankle/Foot   Dorsiflexion (kf): WFL  Plantar flexion: WFL    Strength/Myotome Testing     Left Hip   Planes of Motion   Flexion: 4-  Abduction: 4-  Adduction: 4-    Right Hip   Planes of Motion   Flexion: 4-  Abduction: 4-  Adduction: 4-    Left Knee   Flexion: 4-  Extension: 4-    Right Knee   Flexion: 4-  Extension: 4-    Left Ankle/Foot   Dorsiflexion: 4-  Plantar flexion: 4-    Right Ankle/Foot   Dorsiflexion: 4-  Plantar flexion: 4-    Ambulation     Comments   5x STS -  22 seconds no arm rests      TUG 13 seconds - No device     6 MWT - 135 feet no ad, shuffling gait pattern                  POC Expires Reeval for Medicare to be completed Unit LImit Auth Expiration Date PT/OT/STVisit Limit   7/9 By visit 10       Completed on                  VISIT TRACKER  DATE 4/9            Visit # 1            Remaining 9             V  Precautions: Hx of seizure, falls risk       Manuals                                                                 Neuro Re-Ed             Hip add             Hip abd              LAQ             March                                                     Ther Ex             HR              TR             Mini Squat              Step up              Three way              Nu step  L3 x 10 min                                       Ther Activity                                       Gait Training                                       Modalities

## 2025-04-10 ENCOUNTER — OFFICE VISIT (OUTPATIENT)
Dept: INTERNAL MEDICINE CLINIC | Facility: CLINIC | Age: 75
End: 2025-04-10
Payer: MEDICARE

## 2025-04-10 VITALS
HEART RATE: 64 BPM | DIASTOLIC BLOOD PRESSURE: 82 MMHG | BODY MASS INDEX: 24.49 KG/M2 | SYSTOLIC BLOOD PRESSURE: 130 MMHG | HEIGHT: 65 IN | OXYGEN SATURATION: 99 % | WEIGHT: 147 LBS | TEMPERATURE: 97.5 F

## 2025-04-10 DIAGNOSIS — E03.9 ACQUIRED HYPOTHYROIDISM: ICD-10-CM

## 2025-04-10 DIAGNOSIS — M17.0 PRIMARY OSTEOARTHRITIS OF BOTH KNEES: ICD-10-CM

## 2025-04-10 DIAGNOSIS — R20.2 PARESTHESIA AND PAIN OF BOTH UPPER EXTREMITIES: ICD-10-CM

## 2025-04-10 DIAGNOSIS — M79.601 PARESTHESIA AND PAIN OF BOTH UPPER EXTREMITIES: ICD-10-CM

## 2025-04-10 DIAGNOSIS — J06.9 UPPER RESPIRATORY TRACT INFECTION, UNSPECIFIED TYPE: ICD-10-CM

## 2025-04-10 DIAGNOSIS — R56.9 SEIZURE (HCC): ICD-10-CM

## 2025-04-10 DIAGNOSIS — R29.6 FALLS: Primary | ICD-10-CM

## 2025-04-10 DIAGNOSIS — R26.9 GAIT ABNORMALITY: ICD-10-CM

## 2025-04-10 DIAGNOSIS — F32.1 CURRENT MODERATE EPISODE OF MAJOR DEPRESSIVE DISORDER WITHOUT PRIOR EPISODE (HCC): ICD-10-CM

## 2025-04-10 DIAGNOSIS — M25.562 CHRONIC PAIN OF BOTH KNEES: ICD-10-CM

## 2025-04-10 DIAGNOSIS — R35.0 FREQUENT URINATION: ICD-10-CM

## 2025-04-10 DIAGNOSIS — M25.561 CHRONIC PAIN OF BOTH KNEES: ICD-10-CM

## 2025-04-10 DIAGNOSIS — K44.9 HIATAL HERNIA: ICD-10-CM

## 2025-04-10 DIAGNOSIS — M79.602 PARESTHESIA AND PAIN OF BOTH UPPER EXTREMITIES: ICD-10-CM

## 2025-04-10 DIAGNOSIS — G89.29 CHRONIC PAIN OF BOTH KNEES: ICD-10-CM

## 2025-04-10 PROCEDURE — 99214 OFFICE O/P EST MOD 30 MIN: CPT | Performed by: INTERNAL MEDICINE

## 2025-04-10 PROCEDURE — G2211 COMPLEX E/M VISIT ADD ON: HCPCS | Performed by: INTERNAL MEDICINE

## 2025-04-10 RX ORDER — DIVALPROEX SODIUM 250 MG/1
TABLET, DELAYED RELEASE ORAL
Qty: 270 TABLET | Refills: 1 | Status: SHIPPED | OUTPATIENT
Start: 2025-04-10

## 2025-04-10 RX ORDER — LEVOTHYROXINE SODIUM 100 UG/1
100 TABLET ORAL
Qty: 90 TABLET | Refills: 1 | Status: SHIPPED | OUTPATIENT
Start: 2025-04-10

## 2025-04-10 RX ORDER — MELOXICAM 15 MG/1
15 TABLET ORAL DAILY
Qty: 30 TABLET | Refills: 2 | Status: SHIPPED | OUTPATIENT
Start: 2025-04-10

## 2025-04-10 NOTE — ASSESSMENT & PLAN NOTE
Orders:    divalproex sodium (DEPAKOTE) 250 mg DR tablet; TAKE 1.5 TABLETS BY MOUTH IN THE MORNING AND 1 TABLET AT NIGHT    Valproic acid level, total; Future

## 2025-04-10 NOTE — PROGRESS NOTES
Name: Regino Mitchell      : 1950      MRN: [id number]  Encounter Provider: Reg Palomares DO  Encounter Date: 4/10/2025   Encounter department: [facility]  :  Assessment & Plan  Paresthesia and pain of both upper extremities    Orders:    meloxicam (MOBIC) 15 mg tablet; Take 1 tablet (15 mg total) by mouth daily    Falls    Orders:    Ambulatory referral to Orthopedic Surgery; Future    Chronic pain of both knees    Orders:    Ambulatory referral to Orthopedic Surgery; Future    Gait abnormality         Upper respiratory tract infection, unspecified type         Frequent urination         Current moderate episode of major depressive disorder without prior episode (HCC)           Hiatal hernia         Primary osteoarthritis of both knees    Orders:    Ambulatory referral to Orthopedic Surgery; Future    Seizure (HCC)    Orders:    divalproex sodium (DEPAKOTE) 250 mg DR tablet; TAKE 1.5 TABLETS BY MOUTH IN THE MORNING AND 1 TABLET AT NIGHT    Valproic acid level, total; Future    A/P; Much better. Much more interactive and faster to respond. Better spirits as well. Gait is still poor and discussed xrays. Would continue the mobic and PT. Refer to ortho, will most likely need TKA. Discussed labs and no sz. Will decrease the depakote and recheck level in one month. MDD/KASH are good and tolerating the med. Will continue. RTC two months for f/u labs, knees, gait, MDD/KASH.         History of Present Illness   WM RTC with his daughter for several week f/u for FTT. Pt with lots of falls, difficulty ambulating due to knee pain, not eating, confused at times, frequent urination, and suspected MDD with complicated grieving since his wife passed several months ago. Treated for URI and UTI. SSRI started for MDD, mobic for DJD, and referred to PT. PT just started. URI and UTI improved. Daughter reports pt is doing much better physically and emotionally. Still with MCI. No falls, but gait still bad.  "Labs were good except depakote level high. CXR ok, but knee xrays with severe DJD. No new issues.       Review of Systems   Constitutional:  Positive for activity change. Negative for appetite change, chills, diaphoresis, fatigue and fever.   Respiratory:  Negative for cough, chest tightness, shortness of breath and wheezing.    Cardiovascular:  Negative for chest pain, palpitations and leg swelling.   Gastrointestinal:  Negative for abdominal pain, constipation, diarrhea, nausea and vomiting.   Genitourinary:  Negative for difficulty urinating, dysuria and frequency.   Musculoskeletal:  Positive for arthralgias, back pain and gait problem. Negative for myalgias.   Neurological:  Negative for dizziness, seizures, syncope, weakness, light-headedness and headaches.   Psychiatric/Behavioral:  Negative for confusion, dysphoric mood and sleep disturbance. The patient is not nervous/anxious.        Objective   /82 (Patient Position: Sitting, Cuff Size: Large)   Pulse 64   Temp 97.5 °F (36.4 °C) (Tympanic)   Ht 5' 5\" (1.651 m)   Wt 66.7 kg (147 lb)   SpO2 99%   BMI 24.46 kg/m²      Physical Exam  Vitals and nursing note reviewed.   Constitutional:       General: He is not in acute distress.     Appearance: Normal appearance. He is not ill-appearing.   HENT:      Head: Normocephalic and atraumatic.      Mouth/Throat:      Mouth: Mucous membranes are moist.   Eyes:      Extraocular Movements: Extraocular movements intact.      Conjunctiva/sclera: Conjunctivae normal.      Pupils: Pupils are equal, round, and reactive to light.   Cardiovascular:      Rate and Rhythm: Normal rate and regular rhythm.      Heart sounds: Normal heart sounds. No murmur heard.  Pulmonary:      Effort: Pulmonary effort is normal. No respiratory distress.      Breath sounds: Normal breath sounds. No wheezing, rhonchi or rales.   Abdominal:      General: Bowel sounds are normal. There is no distension.      Palpations: Abdomen is soft.     "  Tenderness: There is no abdominal tenderness.   Musculoskeletal:         General: Tenderness and deformity present.      Right lower leg: No edema.      Left lower leg: No edema.   Neurological:      General: No focal deficit present.      Mental Status: He is alert and oriented to person, place, and time. Mental status is at baseline.   Psychiatric:         Mood and Affect: Mood normal.         Behavior: Behavior normal.         Thought Content: Thought content normal.         Judgment: Judgment normal.

## 2025-04-15 ENCOUNTER — OFFICE VISIT (OUTPATIENT)
Dept: PHYSICAL THERAPY | Facility: CLINIC | Age: 75
End: 2025-04-15
Payer: MEDICARE

## 2025-04-15 DIAGNOSIS — M25.562 CHRONIC PAIN OF BOTH KNEES: ICD-10-CM

## 2025-04-15 DIAGNOSIS — M25.561 CHRONIC PAIN OF BOTH KNEES: ICD-10-CM

## 2025-04-15 DIAGNOSIS — R26.9 GAIT ABNORMALITY: ICD-10-CM

## 2025-04-15 DIAGNOSIS — R29.6 FALLS: Primary | ICD-10-CM

## 2025-04-15 DIAGNOSIS — G89.29 CHRONIC PAIN OF BOTH KNEES: ICD-10-CM

## 2025-04-15 PROCEDURE — 97112 NEUROMUSCULAR REEDUCATION: CPT

## 2025-04-15 PROCEDURE — 97110 THERAPEUTIC EXERCISES: CPT

## 2025-04-15 NOTE — PROGRESS NOTES
"Daily Note     Today's date: 4/15/2025  Patient name: Regino Mitchell  : 1950  MRN: [id number]  Referring provider: Reg Palomares DO  Dx:   Encounter Diagnosis     ICD-10-CM    1. Falls  R29.6       2. Chronic pain of both knees  M25.561     M25.562     G89.29       3. Gait abnormality  R26.9                      Subjective: Pt reports he has appt with doctor  to receive injection B knees.       Objective: See treatment diary below      Assessment: Tolerated treatment well. Initiated exercises as outlined in POC. Issued handouts and reviewed with patient for HEP. Patient demonstrated fatigue post treatment, exhibited good technique with therapeutic exercises, and would benefit from continued PT      Plan: Continue per plan of care.        POC Expires Reeval for Medicare to be completed Unit LImit Auth Expiration Date PT/OT/STVisit Limit    By visit 10       Completed on                  VISIT TRACKER  DATE 4/9 4/15           Visit # 1 2           Remaining 9 8            V  Precautions: Hx of seizure, falls risk       Manuals  4/15                                                               Neuro Re-Ed             Hip add  5\" x20           Hip abd   GTB  5\" x20           LAQ  5\" 2x10           March                                                     Ther Ex             HR   x20           TR  x20           Mini Squat   X10            Step up   NV           Three way   X10 ea           Nu step  L3 x 10 min  L3 x10 min                                     Ther Activity                                       Gait Training                                       Modalities                                          Access Code: [email or web address]  URL: [email or web address]  Date: 04/15/2025  Prepared by: Cynthia Giron    Exercises  - Seated Hip Adduction Isometrics with Ball  - 1 x daily - 3 x weekly - 2 sets - 10 reps - 5\" hold  - Seated Hip Abduction with Resistance  - 1 x daily - 3 x weekly - " "2 sets - 10 reps - 5\" hold  - Seated March  - 1 x daily - 3 x weekly - 1 sets - 10 reps  - Seated Long Arc Quad  - 1 x daily - 3 x weekly - 2 sets - 10 reps - 5\" hold  "

## 2025-04-17 ENCOUNTER — OFFICE VISIT (OUTPATIENT)
Dept: PHYSICAL THERAPY | Facility: CLINIC | Age: 75
End: 2025-04-17
Payer: MEDICARE

## 2025-04-17 DIAGNOSIS — R29.6 FALLS: Primary | ICD-10-CM

## 2025-04-17 DIAGNOSIS — R26.9 GAIT ABNORMALITY: ICD-10-CM

## 2025-04-17 DIAGNOSIS — M25.561 CHRONIC PAIN OF BOTH KNEES: ICD-10-CM

## 2025-04-17 DIAGNOSIS — G89.29 CHRONIC PAIN OF BOTH KNEES: ICD-10-CM

## 2025-04-17 DIAGNOSIS — M25.562 CHRONIC PAIN OF BOTH KNEES: ICD-10-CM

## 2025-04-17 PROCEDURE — 97112 NEUROMUSCULAR REEDUCATION: CPT | Performed by: PHYSICAL THERAPIST

## 2025-04-17 PROCEDURE — 97110 THERAPEUTIC EXERCISES: CPT | Performed by: PHYSICAL THERAPIST

## 2025-04-17 NOTE — PROGRESS NOTES
"Daily Note     Today's date: 2025  Patient name: Regino Mitchell  : 1950  MRN: [id number]  Referring provider: Reg Palomares DO  Dx:   Encounter Diagnosis     ICD-10-CM    1. Falls  R29.6       2. Chronic pain of both knees  M25.561     M25.562     G89.29       3. Gait abnormality  R26.9                      Subjective: Pt reports he is doing ok today. Reports some pain in left knee prior to start of PT.       Objective: See treatment diary below      Assessment: Tolerated treatment well. Patient exhibited good technique with therapeutic exercises and would benefit from continued PT      Plan: Continue per plan of care.        POC Expires Reeval for Medicare to be completed Unit LImit Auth Expiration Date PT/OT/STVisit Limit    By visit 10       Completed on                  VISIT TRACKER  DATE 4/9 4/15 4/17           Visit # 1 2 3          Remaining 9 8 7           V  Precautions: Hx of seizure, falls risk       Manuals  4/15 4/17                                                              Neuro Re-Ed             Hip add  5\" x20 5''20x          Hip abd   GTB  5\" x20 Black 5''20x          LAQ  5\" 2x10 5''20x          March                                                     Ther Ex             HR   x20 x20          TR  x20 x20          Mini Squat   X10            Step up   NV           Three way   X10 ea           Nu step  L3 x 10 min  L3 x10 min                                     Ther Activity                                       Gait Training                                       Modalities                                              "

## 2025-04-21 ENCOUNTER — OFFICE VISIT (OUTPATIENT)
Dept: PHYSICAL THERAPY | Facility: CLINIC | Age: 75
End: 2025-04-21
Attending: INTERNAL MEDICINE
Payer: MEDICARE

## 2025-04-21 DIAGNOSIS — M25.562 CHRONIC PAIN OF BOTH KNEES: ICD-10-CM

## 2025-04-21 DIAGNOSIS — M25.561 CHRONIC PAIN OF BOTH KNEES: ICD-10-CM

## 2025-04-21 DIAGNOSIS — G89.29 CHRONIC PAIN OF BOTH KNEES: ICD-10-CM

## 2025-04-21 DIAGNOSIS — R29.6 FALLS: Primary | ICD-10-CM

## 2025-04-21 DIAGNOSIS — R26.9 GAIT ABNORMALITY: ICD-10-CM

## 2025-04-21 PROCEDURE — 97110 THERAPEUTIC EXERCISES: CPT

## 2025-04-21 PROCEDURE — 97112 NEUROMUSCULAR REEDUCATION: CPT

## 2025-04-21 NOTE — PROGRESS NOTES
"Daily Note     Today's date: 2025  Patient name: Regino Mitchell  : 1950  MRN: [id number]  Referring provider: Reg Palomares DO  Dx:   Encounter Diagnosis     ICD-10-CM    1. Falls  R29.6       2. Chronic pain of both knees  M25.561     M25.562     G89.29       3. Gait abnormality  R26.9                      Subjective: Pt c/o B/L knee pain,  reports his knees crack L >R. States he has appt with ortho tomorrow.       Objective: See treatment diary below      Assessment: Tolerated treatment well. Verbal and occasional tactile cues secondary pt [place]ois. Patient demonstrated fatigue post treatment, exhibited good technique with therapeutic exercises, and would benefit from continued PT      Plan: Continue per plan of care.        POC Expires Reeval for Medicare to be completed Unit LImit Auth Expiration Date PT/OT/STVisit Limit    By visit 10       Completed on                  VISIT TRACKER  DATE 4/9 4/15 4/17  4/21         Visit # 1 2 3 4         Remaining 9 8 7 6          V  Precautions: Hx of seizure, falls risk. Pt is [place]      Manuals  4/15 4/17 4/21                                                             Neuro Re-Ed             Hip add  5\" x20 5''20x 5\" x20         Hip abd   GTB  5\" x20 Black 5''20x Black  5\" x20         LAQ  5\" 2x10 5''20x          March     2x10                                                Ther Ex             HR   x20 x20 x20         TR  x20 x20 x20         Mini Squat   X10   2x10         Step up   NV  6\" x10 ea         Three way   X10 ea  X10 ea         Nu step  L3 x 10 min  L3 x10 min  L4 x10 min         Incline board  Calf stretch    20\" x3 ea                      Ther Activity                                       Gait Training                                       Modalities                                                "

## 2025-04-22 ENCOUNTER — OFFICE VISIT (OUTPATIENT)
Dept: OBGYN CLINIC | Facility: CLINIC | Age: 75
End: 2025-04-22
Attending: INTERNAL MEDICINE
Payer: MEDICARE

## 2025-04-22 VITALS — WEIGHT: 147 LBS | HEIGHT: 65 IN | BODY MASS INDEX: 24.49 KG/M2

## 2025-04-22 DIAGNOSIS — M25.562 PAIN IN BOTH KNEES, UNSPECIFIED CHRONICITY: ICD-10-CM

## 2025-04-22 DIAGNOSIS — M25.561 CHRONIC PAIN OF BOTH KNEES: ICD-10-CM

## 2025-04-22 DIAGNOSIS — G89.29 CHRONIC PAIN OF BOTH KNEES: ICD-10-CM

## 2025-04-22 DIAGNOSIS — M25.561 PAIN IN BOTH KNEES, UNSPECIFIED CHRONICITY: ICD-10-CM

## 2025-04-22 DIAGNOSIS — M25.562 CHRONIC PAIN OF BOTH KNEES: ICD-10-CM

## 2025-04-22 DIAGNOSIS — R29.6 FALLS: ICD-10-CM

## 2025-04-22 DIAGNOSIS — M17.0 PRIMARY OSTEOARTHRITIS OF BOTH KNEES: Primary | ICD-10-CM

## 2025-04-22 PROCEDURE — 99203 OFFICE O/P NEW LOW 30 MIN: CPT | Performed by: ORTHOPAEDIC SURGERY

## 2025-04-22 PROCEDURE — 20610 DRAIN/INJ JOINT/BURSA W/O US: CPT | Performed by: ORTHOPAEDIC SURGERY

## 2025-04-22 RX ORDER — TRIAMCINOLONE ACETONIDE 40 MG/ML
80 INJECTION, SUSPENSION INTRA-ARTICULAR; INTRAMUSCULAR
Status: COMPLETED | OUTPATIENT
Start: 2025-04-22 | End: 2025-04-22

## 2025-04-22 RX ORDER — BUPIVACAINE HYDROCHLORIDE 2.5 MG/ML
4 INJECTION, SOLUTION INFILTRATION; PERINEURAL
Status: COMPLETED | OUTPATIENT
Start: 2025-04-22 | End: 2025-04-22

## 2025-04-22 RX ADMIN — BUPIVACAINE HYDROCHLORIDE 4 ML: 2.5 INJECTION, SOLUTION INFILTRATION; PERINEURAL at 14:00

## 2025-04-22 RX ADMIN — TRIAMCINOLONE ACETONIDE 80 MG: 40 INJECTION, SUSPENSION INTRA-ARTICULAR; INTRAMUSCULAR at 14:00

## 2025-04-22 NOTE — PROGRESS NOTES
Assessment & Plan  Pain in both knees, unspecified chronicity         Falls    Orders:    Ambulatory referral to Orthopedic Surgery    Chronic pain of both knees    Orders:    Ambulatory referral to Orthopedic Surgery    Primary osteoarthritis of both knees  X-rays of both of the patient's knees are consistent with advanced arthritic changes.  Possible treatment options were discussed with the patient and his daughter.  Possible treatment options were discussed with the patient.  He would like to proceed with conservative treatment.  Both of his knees were injected with Kenalog and Marcaine.  He tolerated the injections quite well.  He will follow-up with our office in 2 months.  He is acceptable to this plan.  Orders:    Ambulatory referral to Orthopedic Surgery          The patient has degenerative joint disease of his bilateral knees.  Under aseptic technique, both knees were injected with Kenalog and Marcaine.  He tolerated procedures quite well.  Return back in 2 months for evaluation.  He is not interested in any surgical intervention    Return in about 2 months (around 6/22/2025).      _____________________________________________________  CHIEF COMPLAINT:  Chief Complaint   Patient presents with    Left Knee - Pain    Right Knee - Pain         SUBJECTIVE:  Regino Mitchell is a 75 y.o. male who presents to our office complaining of pain along the medial aspects of his knees.  He has been trying physical therapy which have not relieved his symptoms.  He denies any numbness or tingling.  He denies any fever or chills.    The following portions of the patient's history were reviewed and updated as appropriate: allergies, current medications, past family history, past medical history, past social history, past surgical history and problem list.    PAST MEDICAL HISTORY:  Past Medical History:   Diagnosis Date    High cholesterol     Memory loss     Seizure (HCC)        PAST SURGICAL HISTORY:  Past Surgical  History:   Procedure Laterality Date    HERNIA REPAIR      when he was a kid    WRIST SURGERY Right     when he was a kid       FAMILY HISTORY:  Family History   Problem Relation Age of Onset    Cancer Mother     Diabetes Mother     Prostate cancer Father     Cancer Brother     Prostate cancer Brother        SOCIAL HISTORY:  Social History     Tobacco Use    Smoking status: Former     Types: Cigars     Quit date:      Years since quittin.3     Passive exposure: Past    Smokeless tobacco: Never    Tobacco comments:     cigar use - social    Vaping Use    Vaping status: Never Used   Substance Use Topics    Alcohol use: Not Currently     Comment: two beers/ day and occassional wine - quit     Drug use: Never       MEDICATIONS:    Current Outpatient Medications:     atorvastatin (LIPITOR) 10 mg tablet, TAKE 1 TABLET BY MOUTH EVERY DAY, Disp: 90 tablet, Rfl: 1    divalproex sodium (DEPAKOTE) 250 mg DR tablet, TAKE 1.5 TABLETS BY MOUTH IN THE MORNING AND 1 TABLET AT NIGHT, Disp: 270 tablet, Rfl: 1    escitalopram (LEXAPRO) 10 mg tablet, Take 1 tablet (10 mg total) by mouth daily, Disp: 90 tablet, Rfl: 3    fluticasone (FLONASE) 50 mcg/act nasal spray, 1 spray into each nostril daily, Disp: 16 g, Rfl: 0    levothyroxine 100 mcg tablet, TAKE 1 TABLET (100 MCG TOTAL) BY MOUTH DAILY IN THE EARLY MORNING, Disp: 90 tablet, Rfl: 1    meloxicam (MOBIC) 15 mg tablet, Take 1 tablet (15 mg total) by mouth daily, Disp: 30 tablet, Rfl: 2    methocarbamol (ROBAXIN) 500 mg tablet, TAKE 1 TABLET BY MOUTH EVERY 8 HOURS AS NEEDED FOR MUSCLE SPASMS., Disp: 90 tablet, Rfl: 0    ALLERGIES:  No Known Allergies    ROS:  Review of Systems     Constitutional: Negative for fatigue, fever or loss of appetite.   HENT: Negative.    Respiratory: Negative for shortness of breath, dyspnea.    Cardiovascular: Negative for chest pain/tightness.   Gastrointestinal: Negative for abdominal pain, N/V.   Endocrine: Negative for cold/heat  "intolerance, unexplained weight loss/gain.   Genitourinary: Negative for flank pain, dysuria, hematuria.   Musculoskeletal: Positive for arthralgia   Skin: Negative for rash.    Neurological: Negative for numbness or tingling  Psychiatric/Behavioral: Negative for agitation.  _____________________________________________________  PHYSICAL EXAMINATION:    Height 5' 5\" (1.651 m), weight 66.7 kg (147 lb).    Constitutional: Oriented to person, place, and time. Appears well-developed and well-nourished. No distress.   HENT:   Head: Normocephalic.   Eyes: Conjunctivae are normal. Right eye exhibits no discharge. Left eye exhibits no discharge. No scleral icterus.   Cardiovascular: Normal rate.    Pulmonary/Chest: Effort normal.   Neurological: Alert and oriented to person, place, and time.   Skin: Skin is warm and dry. No rash noted. Not diaphoretic. No erythema. No pallor.   Psychiatric: Normal mood and affect. Behavior is normal. Judgment and thought content normal.      MUSCULOSKELETAL EXAMINATION:   Physical Exam  Ortho Exam    Bilateral lower extremities are neurovascularly intact  Toes are pink and mobile   Compartments are soft  No warmth, erythema or ecchymosis  ROM of knees are from 5-115 degrees  Negative Lachman, drawer or pivot shift  No medial instability  Medial joint line tenderness, slight lateral joint line tenderness  Patellofemoral crepitation   Objective:  BP Readings from Last 1 Encounters:   04/10/25 130/82      Wt Readings from Last 1 Encounters:   04/22/25 66.7 kg (147 lb)        BMI:   Estimated body mass index is 24.46 kg/m² as calculated from the following:    Height as of this encounter: 5' 5\" (1.651 m).    Weight as of this encounter: 66.7 kg (147 lb).      PROCEDURES PERFORMED:  Large joint arthrocentesis: bilateral knee  Universal Protocol:  Consent: Verbal consent obtained.  Risks and benefits: risks, benefits and alternatives were discussed  Consent given by: patient  Patient " understanding: patient states understanding of the procedure being performed  Site marked: the operative site was marked  Supporting Documentation  Indications: pain     Is this a Vis[place] injection? NoProcedure Details  Location: knee - bilateral knee  Preparation: Patient was prepped and draped in the usual sterile fashion  Needle size: 22 G  Ultrasound guidance: no  Approach: lateral    Medications (Right): 4 mL bupivacaine 0.25 %; 80 mg triamcinolone acetonide 40 mg/mLMedications (Left): 4 mL bupivacaine 0.25 %; 80 mg triamcinolone acetonide 40 mg/mL   Patient tolerance: patient tolerated the procedure well with no immediate complications  Dressing:  Sterile dressing applied            Scribe Attestation      I,:  Jackson Ross PA-C am acting as a scribe while in the presence of the attending physician.:       I,:  Reg Prescott DO personally performed the services described in this documentation    as scribed in my presence.:

## 2025-04-23 ENCOUNTER — OFFICE VISIT (OUTPATIENT)
Dept: PHYSICAL THERAPY | Facility: CLINIC | Age: 75
End: 2025-04-23
Attending: INTERNAL MEDICINE
Payer: MEDICARE

## 2025-04-23 DIAGNOSIS — M25.561 CHRONIC PAIN OF BOTH KNEES: ICD-10-CM

## 2025-04-23 DIAGNOSIS — G89.29 CHRONIC PAIN OF BOTH KNEES: ICD-10-CM

## 2025-04-23 DIAGNOSIS — M25.562 CHRONIC PAIN OF BOTH KNEES: ICD-10-CM

## 2025-04-23 DIAGNOSIS — R29.6 FALLS: Primary | ICD-10-CM

## 2025-04-23 PROCEDURE — 97110 THERAPEUTIC EXERCISES: CPT

## 2025-04-23 PROCEDURE — 97112 NEUROMUSCULAR REEDUCATION: CPT

## 2025-04-23 NOTE — PROGRESS NOTES
"Daily Note     Today's date: 2025  Patient name: Regino Mitchell  : 1950  MRN: [id number]  Referring provider: Reg Palomares DO  Dx:   Encounter Diagnosis     ICD-10-CM    1. Falls  R29.6       2. Chronic pain of both knees  M25.561     M25.562     G89.29                      Subjective: Regino reports getting raisa knee injections yesterday and notes improvement in pain levels since.       Objective: See treatment diary below      Assessment: Consistent cueing to ensure correct exercise technique. Able to increase reps for hip 3 way and added sidestepping with cues to avoid dragging feet with good follow through. Pt would continue to benefit from skilled PT.       Plan: Continue with current POC to address pt deficits.        POC Expires Reeval for Medicare to be completed Unit LImit Auth Expiration Date PT/OT/STVisit Limit    By visit 10       Completed on                  VISIT TRACKER  DATE 4/9 4/15 4/17  4/21 4-23        Visit # 1 2 3 4 5        Remaining 9 8 7 6 5         V  Precautions: Hx of seizure, falls risk. Pt is [place]      Manuals  4/15 4/17 4/21 4-23                                                            Neuro Re-Ed             Hip add  5\" x20 5''20x 5\" x20 5\" x20        Hip abd   GTB  5\" x20 Black 5''20x Black  5\" x20 Black 5\" x20        LAQ  5\" 2x10 5''20x  5\" x20        March     2x10 2x10 raisa                                                Ther Ex             HR   x20 x20 x20 x20        TR  x20 x20 x20 x20        Mini Squat   X10   2x10 2x10        Step up   NV  6\" x10 ea 6\" x10  raisa         Three way   X10 ea  X10 ea X15 ea raisa         Nu step  L3 x 10 min  L3 x10 min  L4 x10 min L4 x10 min         Incline board  Calf stretch    20\" x3 ea 20\"x3 ea                     Ther Activity             Sidestepping      X8 R/L at rail                      Gait Training                                       Modalities                                                  "

## 2025-04-28 ENCOUNTER — OFFICE VISIT (OUTPATIENT)
Dept: PHYSICAL THERAPY | Facility: CLINIC | Age: 75
End: 2025-04-28
Attending: INTERNAL MEDICINE
Payer: MEDICARE

## 2025-04-28 DIAGNOSIS — R29.6 FALLS: Primary | ICD-10-CM

## 2025-04-28 DIAGNOSIS — M25.561 CHRONIC PAIN OF BOTH KNEES: ICD-10-CM

## 2025-04-28 DIAGNOSIS — M25.562 CHRONIC PAIN OF BOTH KNEES: ICD-10-CM

## 2025-04-28 DIAGNOSIS — G89.29 CHRONIC PAIN OF BOTH KNEES: ICD-10-CM

## 2025-04-28 PROCEDURE — 97110 THERAPEUTIC EXERCISES: CPT

## 2025-04-28 PROCEDURE — 97112 NEUROMUSCULAR REEDUCATION: CPT

## 2025-04-28 NOTE — PROGRESS NOTES
"Daily Note     Today's date: 2025  Patient name: Regino Mitchell  : 1950  MRN: [id number]  Referring provider: Reg Palomares DO  Dx:   Encounter Diagnosis     ICD-10-CM    1. Falls  R29.6       2. Chronic pain of both knees  M25.561     M25.562     G89.29           Start Time: 1203  Stop Time: 1258  Total time in clinic (min): 55 minutes    Subjective: Pt reports his knees are feeling a little better since injections but still crack.       Objective: See treatment diary below      Assessment: Tolerated treatment well. Pt is [place]. Requires occasional verbal and tactile cues for proper exercise performance. Issued handouts and reviewed with patient for HEP. Patient demonstrated fatigue post treatment, exhibited good technique with therapeutic exercises, and would benefit from continued PT      Plan: Continue per plan of care.        POC Expires Reeval for Medicare to be completed Unit LImit Auth Expiration Date PT/OT/STVisit Limit    By visit 10       Completed on                  VISIT TRACKER  DATE 4/9 4/15 4/17  4/21 4-       Visit # 1 2 3 4 5 6       Remaining 9 8 7 6 5 4        V  Precautions: Hx of seizure, falls risk. Pt is [place]      Manuals  4/15 4/17 4/21 4-                                                           Neuro Re-Ed             Hip add  5\" x20 5''20x 5\" x20 5\" x20 5\" x20       Hip abd   GTB  5\" x20 Black 5''20x Black  5\" x20 Black 5\" x20 Black  5\" x20       LAQ  5\" 2x10 5''20x  5\" x20 5\" x20       March     2x10 2x10 raisa  2x10   B/L                                              Ther Ex             HR   x20 x20 x20 x20 X20        TR  x20 x20 x20 x20 x20       Mini Squat   X10   2x10 2x10 2x10       Step up   NV  6\" x10 ea 6\" x10  raisa  6\" x10 ea       Three way   X10 ea  X10 ea X15 ea raisa  2x10 ea  B/L       Nu step  L3 x 10 min  L3 x10 min  L4 x10 min L4 x10 min  L4 x10 min       Incline board  Calf stretch    20\" x3 ea 20\"x3 ea 20\" x3       HS stretch seated over step   " "   20\" x3                    Ther Activity             Sidestepping      X8 R/L at rail  NV                    Gait Training                                       Modalities                                         HEP access code: [email or web address] (updated 4/28/25)           "

## 2025-04-30 ENCOUNTER — OFFICE VISIT (OUTPATIENT)
Dept: PHYSICAL THERAPY | Facility: CLINIC | Age: 75
End: 2025-04-30
Attending: INTERNAL MEDICINE
Payer: MEDICARE

## 2025-04-30 DIAGNOSIS — M25.562 CHRONIC PAIN OF BOTH KNEES: ICD-10-CM

## 2025-04-30 DIAGNOSIS — R29.6 FALLS: Primary | ICD-10-CM

## 2025-04-30 DIAGNOSIS — G89.29 CHRONIC PAIN OF BOTH KNEES: ICD-10-CM

## 2025-04-30 DIAGNOSIS — R26.9 GAIT ABNORMALITY: ICD-10-CM

## 2025-04-30 DIAGNOSIS — M25.561 CHRONIC PAIN OF BOTH KNEES: ICD-10-CM

## 2025-04-30 PROCEDURE — 97112 NEUROMUSCULAR REEDUCATION: CPT

## 2025-04-30 PROCEDURE — 97110 THERAPEUTIC EXERCISES: CPT

## 2025-04-30 NOTE — PROGRESS NOTES
"Daily Note     Today's date: 2025  Patient name: Regino Mitchell  : 1950  MRN: [id number]  Referring provider: Reg Palomares DO  Dx:   Encounter Diagnosis     ICD-10-CM    1. Falls  R29.6       2. Chronic pain of both knees  M25.561     M25.562     G89.29       3. Gait abnormality  R26.9                      Subjective: Pt reports his knees are feeling a little better, states he doesn't want to have surgery.       Objective: See treatment diary below      Assessment: Tolerated treatment well. Gradual progression with exercise for LE strengthening. Pt is [place]. Requires occasional verbal and tactile cues for proper exercise performance. Patient demonstrated fatigue post treatment and exhibited good technique with therapeutic exercises      Plan: Continue per plan of care.        POC Expires Reeval for Medicare to be completed Unit LImit Auth Expiration Date PT/OT/STVisit Limit    By visit 10       Completed on                  VISIT TRACKER  DATE 4/9 4/15 4/17  4/21 4-      Visit # 1 2 3 4 5 6 7      Remaining 9 8 7 6 5 4 3       V  Precautions: Hx of seizure, falls risk. Pt is [place]      Manuals  4/15 4/17 4/21 4-23 4/28 4/30                                                          Neuro Re-Ed             Hip add  5\" x20 5''20x 5\" x20 5\" x20 5\" x20 5\" x20      Hip abd   GTB  5\" x20 Black 5''20x Black  5\" x20 Black 5\" x20 Black  5\" x20 Grey   5\" x20      LAQ  5\" 2x10 5''20x  5\" x20 5\" x20 5\" x20      March     2x10 2x10 raisa  2x10   B/L 2x10   B/L                                             Ther Ex             HR   x20 x20 x20 x20 X20  x20      TR  x20 x20 x20 x20 x20 x20      Mini Squat   X10   2x10 2x10 2x10 NV      Step up   NV  6\" x10 ea 6\" x10  raisa  6\" x10 ea 6\" x10 ea  B/L      Three way   X10 ea  X10 ea X15 ea raisa  2x10 ea  B/L 1# 2x10 ea      Nu step  L3 x 10 min  L3 x10 min  L4 x10 min L4 x10 min  L4 x10 min L4 x10 min      Incline board  Calf stretch    20\" x3 ea 20\"x3 ea 20\" x3 30\" " "x3      HS stretch seated over step      20\" x3 30\" x3                   Ther Activity             Sidestepping      X8 R/L at rail  NV                    Gait Training                                       Modalities                                         HEP access code: [email or web address] (updated 4/28/25)             "

## 2025-05-06 ENCOUNTER — OFFICE VISIT (OUTPATIENT)
Dept: PHYSICAL THERAPY | Facility: CLINIC | Age: 75
End: 2025-05-06
Attending: INTERNAL MEDICINE
Payer: MEDICARE

## 2025-05-06 DIAGNOSIS — R29.6 FALLS: Primary | ICD-10-CM

## 2025-05-06 DIAGNOSIS — G89.29 CHRONIC PAIN OF BOTH KNEES: ICD-10-CM

## 2025-05-06 DIAGNOSIS — R26.9 GAIT ABNORMALITY: ICD-10-CM

## 2025-05-06 DIAGNOSIS — M25.562 CHRONIC PAIN OF BOTH KNEES: ICD-10-CM

## 2025-05-06 DIAGNOSIS — M25.561 CHRONIC PAIN OF BOTH KNEES: ICD-10-CM

## 2025-05-06 PROCEDURE — 97112 NEUROMUSCULAR REEDUCATION: CPT

## 2025-05-06 PROCEDURE — 97140 MANUAL THERAPY 1/> REGIONS: CPT

## 2025-05-06 PROCEDURE — 97110 THERAPEUTIC EXERCISES: CPT

## 2025-05-06 NOTE — PROGRESS NOTES
"Daily Note     Today's date: 2025  Patient name: Regino Mitchell  : 1950  MRN: [id number]  Referring provider: Reg Palomares DO  Dx:   Encounter Diagnosis     ICD-10-CM    1. Falls  R29.6       2. Chronic pain of both knees  M25.561     M25.562     G89.29       3. Gait abnormality  R26.9                      Subjective: Pt c/o B/L calf and HS soreness over the weekend, denies soreness at present.      Objective: See treatment diary below      Assessment: Tolerated treatment well. Patient demonstrated fatigue post treatment, exhibited good technique with therapeutic exercises, and would benefit from continued PT      Plan: Continue per plan of care.        POC Expires Reeval for Medicare to be completed Unit LImit Auth Expiration Date PT/OT/STVisit Limit    By visit 10       Completed on                  VISIT TRACKER  DATE 4/9 4/15 4/17  4/21 4- 5/6     Visit # 1 2 3 4 5 6 7 8     Remaining 9 8 7 6 5 4 3 2      V  Precautions: Hx of seizure, falls risk. Pt is [place]      Manuals  4/15 4/17 4/21 4- 5/6     Hamstring, [place] B[place]        [facility] [place]                                            Neuro Re-Ed             Hip add  5\" x20 5''20x 5\" x20 5\" x20 5\" x20 5\" x20 5\"x20     Hip abd   GTB  5\" x20 Black 5''20x Black  5\" x20 Black 5\" x20 Black  5\" x20 Grey   5\" x20 Grey  5\" x20     LAQ  5\" 2x10 5''20x  5\" x20 5\" x20 5\" x20 NV     March     2x10 2x10 raisa  2x10   B/L 2x10   B/L Stand 1# x20 ea                                            Ther Ex             HR   x20 x20 x20 x20 X20  x20 x20     TR  x20 x20 x20 x20 x20 x20 X20      Mini Squat   X10   2x10 2x10 2x10 NV NV     Step up   NV  6\" x10 ea 6\" x10  raisa  6\" x10 ea 6\" x10 ea  B/L 6\" x10 ea     Three way   X10 ea  X10 ea X15 ea raisa  2x10 ea  B/L 1# 2x10 ea 1#   2x10 ea     Nu step  L3 x 10 min  L3 x10 min  L4 x10 min L4 x10 min  L4 x10 min L4 x10 min L5 x10 min     Incline board  Calf stretch    20\" x3 ea 20\"x3 ea 20\" x3 30\" x3 Seated With " "strap       HS stretch seated over step      20\" x3 30\" x3                   Ther Activity             Sidestepping      X8 R/L at rail  NV                    Gait Training                                       Modalities                                         HEP access code: [email or web address] (updated 4/28/25)               "

## 2025-05-08 ENCOUNTER — OFFICE VISIT (OUTPATIENT)
Dept: PHYSICAL THERAPY | Facility: CLINIC | Age: 75
End: 2025-05-08
Attending: INTERNAL MEDICINE
Payer: MEDICARE

## 2025-05-08 DIAGNOSIS — G89.29 CHRONIC PAIN OF BOTH KNEES: ICD-10-CM

## 2025-05-08 DIAGNOSIS — M25.561 CHRONIC PAIN OF BOTH KNEES: ICD-10-CM

## 2025-05-08 DIAGNOSIS — R26.9 GAIT ABNORMALITY: ICD-10-CM

## 2025-05-08 DIAGNOSIS — M25.562 CHRONIC PAIN OF BOTH KNEES: ICD-10-CM

## 2025-05-08 DIAGNOSIS — R29.6 FALLS: Primary | ICD-10-CM

## 2025-05-08 PROCEDURE — 97140 MANUAL THERAPY 1/> REGIONS: CPT

## 2025-05-08 PROCEDURE — 97110 THERAPEUTIC EXERCISES: CPT

## 2025-05-08 PROCEDURE — 97112 NEUROMUSCULAR REEDUCATION: CPT

## 2025-05-08 NOTE — PROGRESS NOTES
"Daily Note     Today's date: 2025  Patient name: Regino Mitchell  : 1950  MRN: [id number]  Referring provider: Reg Palomares DO  Dx:   Encounter Diagnosis     ICD-10-CM    1. Falls  R29.6       2. Chronic pain of both knees  M25.561     M25.562     G89.29       3. Gait abnormality  R26.9           Start Time: 0900          Subjective: Pt reports his knees are feeling better.       Objective: See treatment diary below      Assessment: Tolerated treatment well. Gradual progression with PREs for LE strengthening.  Patient demonstrated fatigue post treatment, exhibited good technique with therapeutic exercises, and would benefit from continued PT      Plan: Continue per plan of care.        POC Expires Reeval for Medicare to be completed Unit LImit Auth Expiration Date PT/OT/STVisit Limit    By visit 10       Completed on                  VISIT TRACKER  DATE 4/9 4/15 4/17  4/21 4- 5/ 5/8    Visit # 1 2 3 4 5 6 7 8 9    Remaining 9 8 7 6 5 4 3 2 1     V  Precautions: Hx of seizure, falls risk. Pt is [place]      Manuals  4/15 4/17 4/21 4- 5/6 5/8    Hamstring, calf B/L        [facility]                                           Neuro Re-Ed             Hip add  5\" x20 5''20x 5\" x20 5\" x20 5\" x20 5\" x20 5\"x20 5\" x20    Hip abd   GTB  5\" x20 Black 5''20x Black  5\" x20 Black 5\" x20 Black  5\" x20 Grey   5\" x20 Grey  5\" x20 Grey  5\" x20    LAQ  5\" 2x10 5''20x  5\" x20 5\" x20 5\" x20 NV NV    March     2x10 2x10 raisa  2x10   B/L 2x10   B/L Stand 1# x20 ea Stand march 1.5# x20                                           Ther Ex             HR   x20 x20 x20 x20 X20  x20 x20 x20    TR  x20 x20 x20 x20 x20 x20 X20  x20    Mini Squat   X10   2x10 2x10 2x10 NV NV Sit to stand x10    Step up   NV  6\" x10 ea 6\" x10  raisa  6\" x10 ea 6\" x10 ea  B/L 6\" x10 ea 6\"   x10    Three way   X10 ea  X10 ea X15 ea raisa  2x10 ea  B/L 1# 2x10 ea 1#   2x10 ea 1.5#  2x10 ea    Nu step  L3 x 10 min  L3 x10 min  L4 x10 min L4 " "x10 min  L4 x10 min L4 x10 min L5 x10 min L5 x10 min    Incline board  Calf stretch    20\" x3 ea 20\"x3 ea 20\" x3 30\" x3 Seated With strap       HS stretch seated over step      20\" x3 30\" x3                   Ther Activity             Sidestepping      X8 R/L at rail  NV                    Gait Training                                       Modalities                                         HEP access code: [email or web address] (updated 4/28/25)                 "

## 2025-05-09 ENCOUNTER — APPOINTMENT (OUTPATIENT)
Dept: PHYSICAL THERAPY | Facility: CLINIC | Age: 75
End: 2025-05-09
Attending: INTERNAL MEDICINE
Payer: MEDICARE

## 2025-05-12 ENCOUNTER — APPOINTMENT (OUTPATIENT)
Dept: PHYSICAL THERAPY | Facility: CLINIC | Age: 75
End: 2025-05-12
Attending: INTERNAL MEDICINE
Payer: MEDICARE

## 2025-05-13 ENCOUNTER — EVALUATION (OUTPATIENT)
Dept: PHYSICAL THERAPY | Facility: CLINIC | Age: 75
End: 2025-05-13
Attending: INTERNAL MEDICINE
Payer: MEDICARE

## 2025-05-13 DIAGNOSIS — R29.6 FALLS: Primary | ICD-10-CM

## 2025-05-13 DIAGNOSIS — M25.562 CHRONIC PAIN OF BOTH KNEES: ICD-10-CM

## 2025-05-13 DIAGNOSIS — M25.561 CHRONIC PAIN OF BOTH KNEES: ICD-10-CM

## 2025-05-13 DIAGNOSIS — G89.29 CHRONIC PAIN OF BOTH KNEES: ICD-10-CM

## 2025-05-13 DIAGNOSIS — R26.9 GAIT ABNORMALITY: ICD-10-CM

## 2025-05-13 PROCEDURE — 97112 NEUROMUSCULAR REEDUCATION: CPT | Performed by: PHYSICAL THERAPIST

## 2025-05-13 PROCEDURE — 97110 THERAPEUTIC EXERCISES: CPT | Performed by: PHYSICAL THERAPIST

## 2025-05-13 NOTE — PROGRESS NOTES
PT Progress Note     Today's date: 2025  Patient name: Regino Mitchell  : 1950  MRN: [id number]  Referring provider: Reg Palomares DO  Dx:   Encounter Diagnosis     ICD-10-CM    1. Falls  R29.6       2. Chronic pain of both knees  M25.561     M25.562     G89.29       3. Gait abnormality  R26.9               Assessment  Impairments: abnormal coordination, abnormal gait, abnormal muscle firing, abnormal muscle tone, abnormal or restricted ROM, abnormal movement, activity intolerance, impaired physical strength, lacks appropriate home exercise program, pain with function, safety issue, weight-bearing intolerance and poor posture   Functional limitations: difficulty with amb, stair negotiation  Symptom irritability: moderate    Assessment details: Regino Mitchell is a 75 y.o. male who presents to outpatient PT with a  Falls  Chronic pain of both knees  Gait abnormality.  No further referral appears necessary at this time based upon examination results. Pt presents with decreased strength, ROM, balance, functional activity tolerance, and pain with movement in his bilateral LE's  which is  limiting his ability to perform the aforementioned functional activities.  Etiologic factors include repetitive poor body mechanics. Prognosis is good given HEP compliance and PT 2-3/wk.  Please contact me if you have any questions or recommendations.  Thank you for the opportunity to share in  Regino care.     RA     Regino Mitchell has demonstrated decreased pain, increased strength, increased range of motion, and increased activity tolerance since starting physical therapy services. He reports an overall improvement of 50% thus far.  He continues to present with pain, decreased strength, decreased range of motion, and decreased activity tolerance and would benefit from additional skilled physical therapy interventions to address impairments and maximize function.      Understanding of Dx/Px/POC: good     Prognosis:  good    Goals  STG to be achieved in 4 weeks     1. Pt will reduce 5x STS score to 16 seconds indicating reduced risk for falls   Progressing   2. Pt will improve 6 MWT score to 300 feet indicating improved endurance   Met   3. Pt will improve MMT scores by at least 1/2 grade to promote improved functional activity tolerance   Met      LTG to be achieved in 6-8 weeks   1. Pt will be complaint with HEP   Progressing   2. Pt will improve ROM to WFL, to help facilitate independence with ADL's, IADL's, and functional activities   Progressing   3. Pt will improve Strength to WFL to help facilitate independence with ADL's, IADL's, and functional activities   Progressing   4. Pt will have no limitations with ambulation to help facilitate independence at home and in the community.   Progressing   5. Pt will have no limitations with stair negotiation to help facilitate independence at home and in the community   Progressing           Plan  Patient would benefit from: skilled physical therapy    Planned therapy interventions: ADL training, balance, balance/weight bearing training, flexibility, functional ROM exercises, gait training, graded activity, graded exercise, graded motor, home exercise program, joint mobilization, manual therapy, neuromuscular re-education, patient education, postural training, strengthening, stretching, therapeutic activities and therapeutic exercise    Frequency: 2x week  Duration in weeks: 12  Plan of Care beginning date: 4/9/2025  Plan of Care expiration date: 7/9/2025  Treatment plan discussed with: patient, PTA and referring physician        Subjective Evaluation    History of Present Illness  Mechanism of injury: 75 year old male male presents to outpatient PT with a CC of generalized weakness of bilateral leg pain and generalized weakness.     Reports difficulty with walking, and can't walk as good as before    He does note that he has to use a walker on occasion    Goals: to get legs  stronger.   Patient Goals  Patient goals for therapy: increased strength, decreased pain and increased motion  Patient goal: to have less pain and walk better  Pain    RA      Current pain ratin  0  At best pain ratin  0  At worst pain ratin  0  Quality: dull ache  Relieving factors: medications  Aggravating factors: standing and walking  Progression: worsening          Objective     Active Range of Motion   Left Hip   Flexion: WFL  Abduction: WFL  Adduction: WFL    Right Hip   Flexion: WFL  Abduction: WFL  Adduction: WFL  Left Knee   Flexion: WFL  Extension: WFL    Right Knee   Flexion: WFL  Extension: WFL  Left Ankle/Foot   Dorsiflexion (kf): WFL  Plantar flexion: WFL    Right Ankle/Foot   Dorsiflexion (kf): WFL  Plantar flexion: WFL    Strength/Myotome Testing      RA   Left Hip   Planes of Motion   Flexion: 4-  4  Abduction: 4-  4  Adduction: 4-  4    Right Hip   Planes of Motion   Flexion: 4-  4  Abduction: 4-  4  Adduction: 4-  4    Left Knee   Flexion: 4-  4  Extension: 4-  4    Right Knee   Flexion: 4-  4  Extension: 4-  4    Left Ankle/Foot   Dorsiflexion: 4- 4  Plantar flexion: 4- 4    Right Ankle/Foot   Dorsiflexion: 4- 4  Plantar flexion: 4- 4    Ambulation     Comments   5x STS -  22 seconds no arm rests     RA  19 seconds, Bilateral arms        TUG 13 seconds - No device     RA  NP WFL     6 MWT - 135 feet no ad, shuffling gait pattern     RA  320 feet, No device                   POC Expires Reeval for Medicare to be completed Unit LImit Auth Expiration Date PT/OT/STVisit Limit    By visit 10       Completed on                  VISIT TRACKER  DATE 4/9 4/15 4/17  4/21 4- 5/6 5/ RE   Visit # 1 2 3 4 5 6 7 8 9    Remaining 9 8 7 6 5 4 3 2 1     V  Precautions: Hx of seizure, falls risk. Pt is [place]      Manuals  4/15 4/17 4/21 4- 5/6 5/   Hamstring, calf B/L        MJC MJC                                           Neuro Re-Ed            "  Hip add  5\" x20 5''20x 5\" x20 5\" x20 5\" x20 5\" x20 5\"x20 5\" x20 5''20x   Hip abd   GTB  5\" x20 Black 5''20x Black  5\" x20 Black 5\" x20 Black  5\" x20 Grey   5\" x20 Grey  5\" x20 Grey  5\" x20 Grey 5''20x   LAQ  5\" 2x10 5''20x  5\" x20 5\" x20 5\" x20 NV NV 5''20x   March     2x10 2x10 raisa  2x10   B/L 2x10   B/L Stand 1# x20 ea Stand march 1.5# x20 Stand march 1.5# x20                                          Ther Ex             HR   x20 x20 x20 x20 X20  x20 x20 x20 x20   TR  x20 x20 x20 x20 x20 x20 X20  x20 x20   Mini Squat   X10   2x10 2x10 2x10 NV NV Sit to stand x10 10x   Step up   NV  6\" x10 ea 6\" x10  raisa  6\" x10 ea 6\" x10 ea  B/L 6\" x10 ea 6\"   x10 6''10x   Three way   X10 ea  X10 ea X15 ea raisa  2x10 ea  B/L 1# 2x10 ea 1#   2x10 ea 1.5#  2x10 ea 2# 2x10   Nu step  L3 x 10 min  L3 x10 min  L4 x10 min L4 x10 min  L4 x10 min L4 x10 min L5 x10 min L5 x10 min L5 x 10 min    Incline board  Calf stretch    20\" x3 ea 20\"x3 ea 20\" x3 30\" x3 Seated With strap    30''3x   HS stretch seated over step      20\" x3 30\" x3                   Ther Activity             Sidestepping      X8 R/L at rail  NV                    Gait Training                                       Modalities                                         HEP access code: [email or web address] (updated 4/28/25)                   "

## 2025-05-14 ENCOUNTER — OFFICE VISIT (OUTPATIENT)
Dept: PHYSICAL THERAPY | Facility: CLINIC | Age: 75
End: 2025-05-14
Attending: INTERNAL MEDICINE
Payer: MEDICARE

## 2025-05-14 DIAGNOSIS — M25.561 CHRONIC PAIN OF BOTH KNEES: ICD-10-CM

## 2025-05-14 DIAGNOSIS — M25.562 CHRONIC PAIN OF BOTH KNEES: ICD-10-CM

## 2025-05-14 DIAGNOSIS — R29.6 FALLS: Primary | ICD-10-CM

## 2025-05-14 DIAGNOSIS — R26.9 GAIT ABNORMALITY: ICD-10-CM

## 2025-05-14 DIAGNOSIS — G89.29 CHRONIC PAIN OF BOTH KNEES: ICD-10-CM

## 2025-05-14 PROCEDURE — 97112 NEUROMUSCULAR REEDUCATION: CPT

## 2025-05-14 PROCEDURE — 97110 THERAPEUTIC EXERCISES: CPT

## 2025-05-14 PROCEDURE — 97140 MANUAL THERAPY 1/> REGIONS: CPT

## 2025-05-14 NOTE — PROGRESS NOTES
"Daily Note     Today's date: 2025  Patient name: Regino Mitchell  : 1950  MRN: [id number]  Referring provider: Reg Palomares DO  Dx:   Encounter Diagnosis     ICD-10-CM    1. Falls  R29.6       2. Gait abnormality  R26.9       3. Chronic pain of both knees  M25.561     M25.562     G89.29                      Subjective: Pt reports knees are feeling better.      Objective: See treatment diary below      Assessment: Tolerated treatment well. Gradual progression with exercise for LE strengthening. Patient demonstrated fatigue post treatment, exhibited good technique with therapeutic exercises, and would benefit from continued PT      Plan: Continue per plan of care.        POC Expires Reeval for Medicare to be completed Unit LImit Auth Expiration Date PT/OT/STVisit Limit    By visit 10       Completed on                  VISIT TRACKER  DATE 5/14 4/15 4/17  4/21 4- RE   Visit # 11 2 3 4 5 6 7 8 9 10   Remaining 9 8 7 6 5 4 3 2 1     V  Precautions: Hx of seizure, falls risk. Pt is [place]      Manuals 5/14 4/15 4/17 4/21 4- 5/6 /   Hamstring, calf B/L MJC       MJC MJC                                           Neuro Re-Ed             Hip add 5\" x20 5\" x20 5''20x 5\" x20 5\" x20 5\" x20 5\" x20 5\"x20 5\" x20 5''20x   Hip abd  Grey  5\" x20   GTB  5\" x20 Black 5''20x Black  5\" x20 Black 5\" x20 Black  5\" x20 Grey   5\" x20 Grey  5\" x20 Grey  5\" x20 Grey 5''20x   LAQ 1# x20 5\" 2x10 5''20x  5\" x20 5\" x20 5\" x20 NV NV 5''20x   March  Standing   2# x20   2x10 2x10 raisa  2x10   B/L 2x10   B/L Stand 1# x20 ea Stand march 1.5# x20 Stand march 1.5# x20                                          Ther Ex             HR  x20 x20 x20 x20 x20 X20  x20 x20 x20 x20   TR x20 x20 x20 x20 x20 x20 x20 X20  x20 x20   Mini Squat  X10 sit to stand X10   2x10 2x10 2x10 NV NV Sit to stand x10 10x   Step up  X15 ea  6\" step NV  6\" x10 ea 6\" x10  raisa  6\" x10 ea 6\" x10 ea  B/L 6\" x10 ea 6\"   x10 6''10x " "  Three way  2# 2x10 ea X10 ea  X10 ea X15 ea raisa  2x10 ea  B/L 1# 2x10 ea 1#   2x10 ea 1.5#  2x10 ea 2# 2x10   Nu step  L5x 10 min  L3 x10 min  L4 x10 min L4 x10 min  L4 x10 min L4 x10 min L5 x10 min L5 x10 min L5 x 10 min    Incline board  Calf stretch    20\" x3 ea 20\"x3 ea 20\" x3 30\" x3 Seated With strap    30''3x   HS stretch seated over step      20\" x3 30\" x3                   Ther Activity             Sidestepping      X8 R/L at rail  NV                    Gait Training                                       Modalities                                         HEP access code: [email or web address] (updated 4/28/25)                     "

## 2025-05-19 ENCOUNTER — OFFICE VISIT (OUTPATIENT)
Dept: PHYSICAL THERAPY | Facility: CLINIC | Age: 75
End: 2025-05-19
Attending: INTERNAL MEDICINE
Payer: MEDICARE

## 2025-05-19 ENCOUNTER — VBI (OUTPATIENT)
Dept: ADMINISTRATIVE | Facility: OTHER | Age: 75
End: 2025-05-19

## 2025-05-19 DIAGNOSIS — R29.6 FALLS: Primary | ICD-10-CM

## 2025-05-19 DIAGNOSIS — R26.9 GAIT ABNORMALITY: ICD-10-CM

## 2025-05-19 DIAGNOSIS — G89.29 CHRONIC PAIN OF BOTH KNEES: ICD-10-CM

## 2025-05-19 DIAGNOSIS — M25.561 CHRONIC PAIN OF BOTH KNEES: ICD-10-CM

## 2025-05-19 DIAGNOSIS — M25.562 CHRONIC PAIN OF BOTH KNEES: ICD-10-CM

## 2025-05-19 PROCEDURE — 97112 NEUROMUSCULAR REEDUCATION: CPT | Performed by: PHYSICAL THERAPIST

## 2025-05-19 PROCEDURE — 97110 THERAPEUTIC EXERCISES: CPT | Performed by: PHYSICAL THERAPIST

## 2025-05-19 NOTE — PROGRESS NOTES
"Daily Note     Today's date: 2025  Patient name: Regino Mitchell  : 1950  MRN: [id number]  Referring provider: Reg Palomares DO  Dx:   Encounter Diagnosis     ICD-10-CM    1. Falls  R29.6       2. Gait abnormality  R26.9       3. Chronic pain of both knees  M25.561     M25.562     G89.29                      Subjective: Pt offers no new complaints today       Objective: See treatment diary below      Assessment: Tolerated treatment well. Patient exhibited good technique with therapeutic exercises and would benefit from continued PT completed gastroc and HS stretch date, to help increase LE flexibility, Pt tolerated well. Decreased sx reported following stretches       Plan: Continue per plan of care.        POC Expires Reeval for Medicare to be completed Unit LImit Auth Expiration Date PT/OT/STVisit Limit    By visit 10       Completed on                  VISIT TRACKER  DATE - RE   Visit # 11 12 3 4 5 6 7 8 9 10   Remaining 9 8 7 6 5 4 3 2 1     V  Precautions: Hx of seizure, falls risk. Pt is [place]      Manuals -   Hamstring, calf B/L MJC       MJC MJC                                           Neuro Re-Ed             Hip add 5\" x20 5\" x20 5''20x 5\" x20 5\" x20 5\" x20 5\" x20 5\"x20 5\" x20 5''20x   Hip abd  Grey  5\" x20   GTB  5\" x20 Black 5''20x Black  5\" x20 Black 5\" x20 Black  5\" x20 Grey   5\" x20 Grey  5\" x20 Grey  5\" x20 Grey 5''20x   LAQ 1# x20 5\" 2# 2x10 5''20x  5\" x20 5\" x20 5\" x20 NV NV 5''20x   March  Standing   2# x20 Standing   2# x20  2x10 2x10 raisa  2x10   B/L 2x10   B/L Stand 1# x20 ea Stand march 1.5# x20 Stand march 1.5# x20                                          Ther Ex             HR  x20 x20 x20 x20 x20 X20  x20 x20 x20 x20   TR x20 x20 x20 x20 x20 x20 x20 X20  x20 x20   Mini Squat  X10 sit to stand X10   2x10 2x10 2x10 NV NV Sit to stand x10 10x   Step up  X15 ea  6\" step NV  6\" x10 ea 6\" " "x10  raisa  6\" x10 ea 6\" x10 ea  B/L 6\" x10 ea 6\"   x10 6''10x   Three way  2# 2x10 ea X10 ea  X10 ea X15 ea raisa  2x10 ea  B/L 1# 2x10 ea 1#   2x10 ea 1.5#  2x10 ea 2# 2x10   Nu step  L5x 10 min  L3 x10 min  L4 x10 min L4 x10 min  L4 x10 min L4 x10 min L5 x10 min L5 x10 min L5 x 10 min    Incline board  Calf stretch  30''3x  20\" x3 ea 20\"x3 ea 20\" x3 30\" x3 Seated With strap    30''3x   HS stretch seated over step  30''3x    20\" x3 30\" x3                   Ther Activity             Sidestepping      X8 R/L at rail  NV                    Gait Training                                       Modalities                                         HEP access code: [email or web address] (updated 4/28/25)                       "

## 2025-05-19 NOTE — TELEPHONE ENCOUNTER
05/19/25 2:03 PM    The patient was called and a message could not be left on the phone number on file/ phone number on file is incorrect.    Thank you.  Karla Walker MA  [facility]

## 2025-05-21 ENCOUNTER — OFFICE VISIT (OUTPATIENT)
Dept: PHYSICAL THERAPY | Facility: CLINIC | Age: 75
End: 2025-05-21
Attending: INTERNAL MEDICINE
Payer: MEDICARE

## 2025-05-21 DIAGNOSIS — M25.561 CHRONIC PAIN OF BOTH KNEES: ICD-10-CM

## 2025-05-21 DIAGNOSIS — R29.6 FALLS: Primary | ICD-10-CM

## 2025-05-21 DIAGNOSIS — R26.9 GAIT ABNORMALITY: ICD-10-CM

## 2025-05-21 DIAGNOSIS — M25.562 CHRONIC PAIN OF BOTH KNEES: ICD-10-CM

## 2025-05-21 DIAGNOSIS — G89.29 CHRONIC PAIN OF BOTH KNEES: ICD-10-CM

## 2025-05-21 PROCEDURE — 97140 MANUAL THERAPY 1/> REGIONS: CPT

## 2025-05-21 PROCEDURE — 97110 THERAPEUTIC EXERCISES: CPT

## 2025-05-21 PROCEDURE — 97112 NEUROMUSCULAR REEDUCATION: CPT

## 2025-05-21 NOTE — PROGRESS NOTES
"Daily Note     Today's date: 2025  Patient name: Regino Mitchell  : 1950  MRN: [id number]  Referring provider: Reg Palomares DO  Dx:   Encounter Diagnosis     ICD-10-CM    1. Falls  R29.6       2. Chronic pain of both knees  M25.561     M25.562     G89.29       3. Gait abnormality  R26.9                      Subjective: Pt has no new c/o prior to exercise today.       Objective: See treatment diary below      Assessment: Tolerated treatment well. Pt is able to perform sit to stand exercise without UE support. Patient demonstrated fatigue post treatment, exhibited good technique with therapeutic exercises, and would benefit from continued PT      Plan: Continue per plan of care.        POC Expires Reeval for Medicare to be completed Unit LImit Auth Expiration Date PT/OT/STVisit Limit    By visit 10       Completed on                  VISIT TRACKER  DATE - RE   Visit # 11 12 13 4 5 6 7 8 9 10   Remaining 9 8 7 6 5 4 3 2 1     V  Precautions: Hx of seizure, falls risk. Pt is [place]      Manuals -   Hamstring, calf B/L MJC  MJC     MJC MJC                                           Neuro Re-Ed             Hip add 5\" x20 5\" x20 5''20x 5\" x20 5\" x20 5\" x20 5\" x20 5\"x20 5\" x20 5''20x   Hip abd  Grey  5\" x20   GTB  5\" x20 Grey 5''20x Black  5\" x20 Black 5\" x20 Black  5\" x20 Grey   5\" x20 Grey  5\" x20 Grey  5\" x20 Grey 5''20x   LAQ 1# x20 5\" 2# 2x10 2# 5''20x  5\" x20 5\" x20 5\" x20 NV NV 5''20x   March  Standing   2# x20 Standing   2# x20 Standing   2# x20 ea 2x10 2x10 raisa  2x10   B/L 2x10   B/L Stand 1# x20 ea Stand march 1.5# x20 Stand march 1.5# x20                                          Ther Ex             HR  x20 x20 x20 x20 x20 X20  x20 x20 x20 x20   TR x20 x20 x20 x20 x20 x20 x20 X20  x20 x20   Mini Squat  X10 sit to stand X10  X15  2x10 2x10 2x10 NV NV Sit to stand x10 10x   Step up  X15 ea  6\" step NV X15 " "ea  6\" step 6\" x10 ea 6\" x10  raisa  6\" x10 ea 6\" x10 ea  B/L 6\" x10 ea 6\"   x10 6''10x   Three way  2# 2x10 ea X10 ea 2# 2x10  X10 ea X15 ea raisa  2x10 ea  B/L 1# 2x10 ea 1#   2x10 ea 1.5#  2x10 ea 2# 2x10   Nu step  L5x 10 min  L3 x10 min L5 x10 min L4 x10 min L4 x10 min  L4 x10 min L4 x10 min L5 x10 min L5 x10 min L5 x 10 min    Incline board  Calf stretch  30''3x  20\" x3 ea 20\"x3 ea 20\" x3 30\" x3 Seated With strap    30''3x   HS stretch seated over step  30''3x    20\" x3 30\" x3                   Ther Activity             Sidestepping      X8 R/L at rail  NV                    Gait Training                                       Modalities                                         HEP access code: [email or web address] (updated 4/28/25)                         "

## 2025-05-27 ENCOUNTER — OFFICE VISIT (OUTPATIENT)
Dept: PHYSICAL THERAPY | Facility: CLINIC | Age: 75
End: 2025-05-27
Attending: INTERNAL MEDICINE
Payer: MEDICARE

## 2025-05-27 DIAGNOSIS — M25.562 CHRONIC PAIN OF BOTH KNEES: ICD-10-CM

## 2025-05-27 DIAGNOSIS — G89.29 CHRONIC PAIN OF BOTH KNEES: ICD-10-CM

## 2025-05-27 DIAGNOSIS — R26.9 GAIT ABNORMALITY: ICD-10-CM

## 2025-05-27 DIAGNOSIS — M25.561 CHRONIC PAIN OF BOTH KNEES: ICD-10-CM

## 2025-05-27 DIAGNOSIS — R29.6 FALLS: Primary | ICD-10-CM

## 2025-05-27 PROCEDURE — 97112 NEUROMUSCULAR REEDUCATION: CPT | Performed by: PHYSICAL THERAPIST

## 2025-05-27 PROCEDURE — 97110 THERAPEUTIC EXERCISES: CPT | Performed by: PHYSICAL THERAPIST

## 2025-05-27 NOTE — PROGRESS NOTES
"Daily Note     Today's date: 2025  Patient name: Regino Mitchell  : 1950  MRN: [id number]  Referring provider: Reg Palomares DO  Dx:   Encounter Diagnosis     ICD-10-CM    1. Falls  R29.6       2. Chronic pain of both knees  M25.561     M25.562     G89.29       3. Gait abnormality  R26.9                      Subjective: Pt offers no new complaints states he is doing well       Objective: See treatment diary below      Assessment: Tolerated treatment well. Patient exhibited good technique with therapeutic exercises and would benefit from continued PT      Plan: Continue per plan of care.        POC Expires Reeval for Medicare to be completed Unit LImit Auth Expiration Date PT/OT/STVisit Limit    By visit 10       Completed on                  VISIT TRACKER  DATE - RE   Visit # 11 12 13 14 5 6 7 8 9 10   Remaining 9 8 7 6 5 4 3 2 1     V  Precautions: Hx of seizure, falls risk. Pt is [place]      Manuals -   Hamstring, calf B/L MJC  MJC     MJC MJC                                           Neuro Re-Ed             Hip add 5\" x20 5\" x20 5''20x 5\" x20 5\" x20 5\" x20 5\" x20 5\"x20 5\" x20 5''20x   Hip abd  Grey  5\" x20   GTB  5\" x20 Grey 5''20x Grey   5\" x20 Black 5\" x20 Black  5\" x20 Grey   5\" x20 Grey  5\" x20 Grey  5\" x20 Grey 5''20x   LAQ 1# x20 5\" 2# 2x10 2# 5''20x 2# 5''20x 5\" x20 5\" x20 5\" x20 NV NV 5''20x   March  Standing   2# x20 Standing   2# x20 Standing   2# x20 ea Standing   2# x20 ea 2x10 raisa  2x10   B/L 2x10   B/L Stand 1# x20 ea Stand march 1.5# x20 Stand march 1.5# x20                                          Ther Ex             HR  x20 x20 x20 x20 x20 X20  x20 x20 x20 x20   TR x20 x20 x20 x20 x20 x20 x20 X20  x20 x20   Mini Squat  X10 sit to stand X10  X15  2x10 2x10 2x10 NV NV Sit to stand x10 10x   Step up  X15 ea  6\" step NV X15 ea  6\" step 6\" x10 ea 6\" x10  raisa  6\" x10 ea 6\" x10 ea  B/L 6\" x10 ea " "6\"   x10 6''10x   Three way  2# 2x10 ea X10 ea 2# 2x10  X10 2# ea X15 ea raisa  2x10 ea  B/L 1# 2x10 ea 1#   2x10 ea 1.5#  2x10 ea 2# 2x10   Nu step  L5x 10 min  L3 x10 min L5 x10 min L4 x10 min L4 x10 min  L4 x10 min L4 x10 min L5 x10 min L5 x10 min L5 x 10 min    Incline board  Calf stretch  30''3x   20\"x3 ea 20\" x3 30\" x3 Seated With strap    30''3x   HS stretch seated over step  30''3x    20\" x3 30\" x3                   Ther Activity             Sidestepping      X8 R/L at rail  NV                    Gait Training                                       Modalities                                         HEP access code: [email or web address] (updated 4/28/25)                           "

## 2025-05-29 ENCOUNTER — OFFICE VISIT (OUTPATIENT)
Dept: PHYSICAL THERAPY | Facility: CLINIC | Age: 75
End: 2025-05-29
Attending: INTERNAL MEDICINE
Payer: MEDICARE

## 2025-05-29 DIAGNOSIS — M25.561 CHRONIC PAIN OF BOTH KNEES: ICD-10-CM

## 2025-05-29 DIAGNOSIS — R29.6 FALLS: Primary | ICD-10-CM

## 2025-05-29 DIAGNOSIS — G89.29 CHRONIC PAIN OF BOTH KNEES: ICD-10-CM

## 2025-05-29 DIAGNOSIS — M25.562 CHRONIC PAIN OF BOTH KNEES: ICD-10-CM

## 2025-05-29 PROCEDURE — 97140 MANUAL THERAPY 1/> REGIONS: CPT

## 2025-05-29 PROCEDURE — 97112 NEUROMUSCULAR REEDUCATION: CPT

## 2025-05-29 PROCEDURE — 97110 THERAPEUTIC EXERCISES: CPT

## 2025-05-29 NOTE — PROGRESS NOTES
"Daily Note     Today's date: 2025  Patient name: Regino Mitchell  : 1950  MRN: [id number]  Referring provider: eRg Palomares DO  Dx:   Encounter Diagnosis     ICD-10-CM    1. Falls  R29.6       2. Chronic pain of both knees  M25.561     M25.562     G89.29           Start Time: 1330          Subjective: Pt denies any recent falls, reports knees are feeling ok.       Objective: See treatment diary below      Assessment: Tolerated treatment well. Gradual progression with PREs for LE strengthening. Patient demonstrated fatigue post treatment, exhibited good technique with therapeutic exercises, and would benefit from continued PT      Plan: Continue per plan of care.        POC Expires Reeval for Medicare to be completed Unit LImit Auth Expiration Date PT/OT/STVisit Limit    By visit 10       Completed on                  VISIT TRACKER  DATE  RE   Visit # 11 12 13 14 15 6 7 8 9 10   Remaining 9 8 7 6 5 4 3 2 1     V  Precautions: Hx of seizure, falls risk. Pt is [place]      Manuals    Hamstring, calf B/L MJC  MJC  MJC   MJC MJC                                           Neuro Re-Ed             Hip add 5\" x20 5\" x20 5''20x 5\" x20 5\" x20 5\" x20 5\" x20 5\"x20 5\" x20 5''20x   Hip abd  Grey  5\" x20   GTB  5\" x20 Grey 5''20x Grey   5\" x20 Grey  5\" x20 Black  5\" x20 Grey   5\" x20 Grey  5\" x20 Grey  5\" x20 Grey 5''20x   LAQ 1# x20 5\" 2# 2x10 2# 5''20x 2# 5''20x 5\" x20  2.5# 5\" x20 5\" x20 NV NV 5''20x   March  Standing   2# x20 Standing   2# x20 Standing   2# x20 ea Standing   2# x20 ea Standing  2.5# 2x10 raisa  2x10   B/L 2x10   B/L Stand 1# x20 ea Stand march 1.5# x20 Stand march 1.5# x20                                          Ther Ex             HR  x20 x20 x20 x20 x20 X20  x20 x20 x20 x20   TR x20 x20 x20 x20 x20 x20 x20 X20  x20 x20   Mini Squat  X10 sit to stand X10  X15  2x10 2x10 2x10 NV NV Sit to stand x10 10x " "  Step up  X15 ea  6\" step NV X15 ea  6\" step 6\" x10 ea 6\" 2x10  raisa  6\" x10 ea 6\" x10 ea  B/L 6\" x10 ea 6\"   x10 6''10x   Three way  2# 2x10 ea X10 ea 2# 2x10  X10 2# ea 2x10 2.5# ea raisa  2x10 ea  B/L 1# 2x10 ea 1#   2x10 ea 1.5#  2x10 ea 2# 2x10   Nu step  L5x 10 min  L3 x10 min L5 x10 min L4 x10 min L5 x10 min  L4 x10 min L4 x10 min L5 x10 min L5 x10 min L5 x 10 min    Incline board  Calf stretch  30''3x    20\" x3 30\" x3 Seated With strap    30''3x   HS stretch seated over step  30''3x    20\" x3 30\" x3                   Ther Activity             Sidestepping       NV                    Gait Training                                       Modalities                                         HEP access code: [email or web address] (updated 4/28/25)                             "

## 2025-06-02 ENCOUNTER — OFFICE VISIT (OUTPATIENT)
Dept: PHYSICAL THERAPY | Facility: CLINIC | Age: 75
End: 2025-06-02
Attending: INTERNAL MEDICINE
Payer: MEDICARE

## 2025-06-02 DIAGNOSIS — G89.29 CHRONIC PAIN OF BOTH KNEES: ICD-10-CM

## 2025-06-02 DIAGNOSIS — R29.6 FALLS: Primary | ICD-10-CM

## 2025-06-02 DIAGNOSIS — M25.561 CHRONIC PAIN OF BOTH KNEES: ICD-10-CM

## 2025-06-02 DIAGNOSIS — R26.9 GAIT ABNORMALITY: ICD-10-CM

## 2025-06-02 DIAGNOSIS — M25.562 CHRONIC PAIN OF BOTH KNEES: ICD-10-CM

## 2025-06-02 PROCEDURE — 97110 THERAPEUTIC EXERCISES: CPT

## 2025-06-02 PROCEDURE — 97112 NEUROMUSCULAR REEDUCATION: CPT

## 2025-06-02 PROCEDURE — 97140 MANUAL THERAPY 1/> REGIONS: CPT

## 2025-06-02 NOTE — PROGRESS NOTES
"Daily Note     Today's date: 2025  Patient name: Regino Mitchell  : 1950  MRN: [id number]  Referring provider: Reg Palomares DO  Dx:   Encounter Diagnosis     ICD-10-CM    1. Falls  R29.6       2. Chronic pain of both knees  M25.561     M25.562     G89.29       3. Gait abnormality  R26.9                      Subjective: Pt denied any recent falls. He does feel that his knees are getting better and decreasing in pain.       Objective: See treatment diary below      Assessment: Tolerated treatment well. Patient demonstrated fatigue post treatment, exhibited good technique with therapeutic exercises, and would benefit from continued PT      Plan: Continue per plan of care.  Progress treatment as tolerated.         POC Expires Reeval for Medicare to be completed Unit LImit Auth Expiration Date PT/OT/STVisit Limit    By visit 10       Completed on                  VISIT TRACKER  DATE  RE   Visit # 11 12 13 14 15 16  8 9 10   Remaining 9 8 7 6 5 4  2 1     V  Precautions: Hx of seizure, falls risk. Pt is [place]      Manuals    Hamstring, calf B/L MJC  MJC  MJC TM  MJC MJC                                           Neuro Re-Ed             Hip add 5\" x20 5\" x20 5''20x 5\" x20 5\" x20 5\" x20  5\"x20 5\" x20 5''20x   Hip abd  Grey  5\" x20   GTB  5\" x20 Grey 5''20x Grey   5\" x20 Grey  5\" x20 Grey  5\" x20  Grey  5\" x20 Grey  5\" x20 Grey 5''20x   LAQ 1# x20 5\" 2# 2x10 2# 5''20x 2# 5''20x 5\" x20  2.5# 5\" x20  2.5#  NV NV 5''20x   March  Standing   2# x20 Standing   2# x20 Standing   2# x20 ea Standing   2# x20 ea Standing  2.5# 2x10 raisa  Standing  2.5# 2x10 raisa   Stand 1# x20 ea Stand # x20 Stand # x20                                          Ther Ex             HR  x20 x20 x20 x20 x20 x20  x20 x20 x20   TR x20 x20 x20 x20 x20 x20  X20  x20 x20   Mini Squat  X10 sit to stand X10  X15  2x10 2x10 2x10  NV Sit to stand x10 " "10x   Step up  X15 ea  6\" step NV X15 ea  6\" step 6\" x10 ea 6\" 2x10  raisa  6\" 2x10  raisa   6\" x10 ea 6\"   x10 6''10x   Three way  2# 2x10 ea X10 ea 2# 2x10  X10 2# ea 2x10 2.5# ea raisa  2x10 2.5# ea raisa  1#   2x10 ea 1.5#  2x10 ea 2# 2x10   Nu step  L5x 10 min  L3 x10 min L5 x10 min L4 x10 min L5 x10 min  L5 x10 min   L5 x10 min L5 x10 min L5 x 10 min    Incline board  Calf stretch  30''3x      Seated With strap    30''3x   HS stretch seated over step  30''3x                        Ther Activity             Sidestepping                           Gait Training                                       Modalities                                         HEP access code: [email or web address] (updated 4/28/25)                               "

## 2025-06-04 ENCOUNTER — APPOINTMENT (OUTPATIENT)
Dept: PHYSICAL THERAPY | Facility: CLINIC | Age: 75
End: 2025-06-04
Attending: INTERNAL MEDICINE
Payer: MEDICARE

## 2025-06-05 ENCOUNTER — OFFICE VISIT (OUTPATIENT)
Dept: PHYSICAL THERAPY | Facility: CLINIC | Age: 75
End: 2025-06-05
Attending: INTERNAL MEDICINE
Payer: MEDICARE

## 2025-06-05 DIAGNOSIS — R29.6 FALLS: Primary | ICD-10-CM

## 2025-06-05 DIAGNOSIS — R26.9 GAIT ABNORMALITY: ICD-10-CM

## 2025-06-05 DIAGNOSIS — M25.561 CHRONIC PAIN OF BOTH KNEES: ICD-10-CM

## 2025-06-05 DIAGNOSIS — G89.29 CHRONIC PAIN OF BOTH KNEES: ICD-10-CM

## 2025-06-05 DIAGNOSIS — M25.562 CHRONIC PAIN OF BOTH KNEES: ICD-10-CM

## 2025-06-05 PROCEDURE — 97112 NEUROMUSCULAR REEDUCATION: CPT

## 2025-06-05 PROCEDURE — 97110 THERAPEUTIC EXERCISES: CPT

## 2025-06-05 PROCEDURE — 97140 MANUAL THERAPY 1/> REGIONS: CPT

## 2025-06-05 NOTE — PROGRESS NOTES
"Daily Note     Today's date: 2025  Patient name: Regino Mitchell  : 1950  MRN: [id number]  Referring provider: Reg Palomares DO  Dx:   Encounter Diagnosis     ICD-10-CM    1. Falls  R29.6       2. Chronic pain of both knees  M25.561     M25.562     G89.29       3. Gait abnormality  R26.9                      Subjective: Pt reported minor L knee pain prior to PT. His chief complaint is the pain he is having in his R greater toe. He reported that his toe nail is starting to come off. He has bandage his toe.       Objective: See treatment diary below      Assessment: Modified program performed secondary to toe pain. Advised pt to contact his PCP is his toe worsens. Tolerated treatment well. Patient demonstrated fatigue post treatment, exhibited good technique with therapeutic exercises, and would benefit from continued PT      Plan: Continue per plan of care.  Progress treatment as tolerated.         POC Expires Reeval for Medicare to be completed Unit LImit Auth Expiration Date PT/OT/STVisit Limit    By visit 10       Completed on                  VISIT TRACKER  DATE  RE   Visit # 11 12 13 14 15 16 17  9 10   Remaining 9 8 7 6 5 4 3  1     V  Precautions: Hx of seizure, falls risk. Pt is [place]      Manuals    Hamstring, calf B/L MJC  MJC  MJC TM TM  MJC                                           Neuro Re-Ed             Hip add 5\" x20 5\" x20 5''20x 5\" x20 5\" x20 5\" x20 5\" x20  5\" x20 5''20x   Hip abd  Grey  5\" x20   GTB  5\" x20 Grey 5''20x Grey   5\" x20 Grey  5\" x20 Grey  5\" x20 5\" x20 Morris TB  Morris  5\" x20 Grey 5''20x   LAQ 1# x20 5\" 2# 2x10 2# 5''20x 2# 5''20x 5\" x20  2.5# 5\" x20  2.5# 5\" x20  2.5#  NV 5''20x   March  Standing   2# x20 Standing   2# x20 Standing   2# x20 ea Standing   2# x20 ea Standing  2.5# 2x10 raisa  Standing  2.5# 2x10 raisa  Standing  2.5# 2x10 raisa   Stand march 1.5# x20 Stand march 1.5# x20           " "                               Ther Ex             HR  x20 x20 x20 x20 x20 x20 Resume NV  x20 x20   TR x20 x20 x20 x20 x20 x20 Resume NV  x20 x20   Mini Squat  X10 sit to stand X10  X15  2x10 2x10 2x10 2x10  Sit to stand x10 10x   Step up  X15 ea  6\" step NV X15 ea  6\" step 6\" x10 ea 6\" 2x10  raisa  6\" 2x10  raisa  Resume NV  6\"   x10 6''10x   Three way  2# 2x10 ea X10 ea 2# 2x10  X10 2# ea 2x10 2.5# ea raisa  2x10 2.5# ea raisa 2x10 2.5# ea raisa  1.5#  2x10 ea 2# 2x10   Nu step  L5x 10 min  L3 x10 min L5 x10 min L4 x10 min L5 x10 min  L5 x10 min  L5 x10 min   L5 x10 min L5 x 10 min    Incline board  Calf stretch  30''3x        30''3x   HS stretch seated over step  30''3x                        Ther Activity             Sidestepping                           Gait Training                                       Modalities                                         HEP access code: [email or web address] (updated 4/28/25)                                 "

## 2025-06-09 ENCOUNTER — OFFICE VISIT (OUTPATIENT)
Dept: PHYSICAL THERAPY | Facility: CLINIC | Age: 75
End: 2025-06-09
Attending: INTERNAL MEDICINE
Payer: MEDICARE

## 2025-06-09 DIAGNOSIS — M25.562 CHRONIC PAIN OF BOTH KNEES: ICD-10-CM

## 2025-06-09 DIAGNOSIS — M25.561 CHRONIC PAIN OF BOTH KNEES: ICD-10-CM

## 2025-06-09 DIAGNOSIS — R29.6 FALLS: Primary | ICD-10-CM

## 2025-06-09 DIAGNOSIS — R26.9 GAIT ABNORMALITY: ICD-10-CM

## 2025-06-09 DIAGNOSIS — G89.29 CHRONIC PAIN OF BOTH KNEES: ICD-10-CM

## 2025-06-09 PROCEDURE — 97110 THERAPEUTIC EXERCISES: CPT | Performed by: PHYSICAL THERAPIST

## 2025-06-09 PROCEDURE — 97112 NEUROMUSCULAR REEDUCATION: CPT | Performed by: PHYSICAL THERAPIST

## 2025-06-09 PROCEDURE — 97140 MANUAL THERAPY 1/> REGIONS: CPT | Performed by: PHYSICAL THERAPIST

## 2025-06-09 NOTE — PROGRESS NOTES
"Daily Note     Today's date: 2025  Patient name: Regino Mitchell  : 1950  MRN: [id number]  Referring provider: Reg Palomares DO  Dx:   Encounter Diagnosis     ICD-10-CM    1. Falls  R29.6       2. Chronic pain of both knees  M25.561     M25.562     G89.29       3. Gait abnormality  R26.9                      Subjective: Pt states his legs feel tight today. Sx are resolved following stretching.       Objective: See treatment diary below      Assessment: Tolerated treatment well. Patient exhibited good technique with therapeutic exercises and would benefit from continued PT tolerated treatment well. Decreased sx in bilateral LE's following manual stretching today.       Plan: Continue per plan of care.        POC Expires Reeval for Medicare to be completed Unit LImit Auth Expiration Date PT/OT/STVisit Limit    By visit 10       Completed on                  VISIT TRACKER  DATE  RE   Visit # 11 12 13 14 15 16 17 18 9 10   Remaining 9 8 7 6 5 4 3 2 1     V  Precautions: Hx of seizure, falls risk. Pt is [facility]      Manuals  6/   Hamstring, calf B/L MJC  MJC  MJC TM TM RR MJC                                           Neuro Re-Ed             Hip add 5\" x20 5\" x20 5''20x 5\" x20 5\" x20 5\" x20 5\" x20 5''20x 5\" x20 5''20x   Hip abd  Grey  5\" x20   GTB  5\" x20 Grey 5''20x Grey   5\" x20 Grey  5\" x20 Grey  5\" x20 5\" x20 Grey TB 5\" x20 Grey TB Grey  5\" x20 Morris 5''20x   LAQ 1# x20 5\" 2# 2x10 2# 5''20x 2# 5''20x 5\" x20  2.5# 5\" x20  2.5# 5\" x20  2.5# 5\" x20  3# NV 5''20x   March  Standing   2# x20 Standing   2# x20 Standing   2# x20 ea Standing   2# x20 ea Standing  2.5# 2x10 raisa  Standing  2.5# 2x10 raisa  Standing  2.5# 2x10 raisa  Standing  3# 2x10 raisa  Stand march 1.5# x20 Stand march 1.5# x20                                          Ther Ex             HR  x20 x20 x20 x20 x20 x20 Resume NV 20x x20 x20   TR x20 x20 x20 x20 x20 x20 " "Resume NV 20x x20 x20   Mini Squat  X10 sit to stand X10  X15  2x10 2x10 2x10 2x10 2x10 Sit to stand x10 10x   Step up  X15 ea  6\" step NV X15 ea  6\" step 6\" x10 ea 6\" 2x10  raisa  6\" 2x10  raisa  Resume NV 6\" 2x10  raisa  6\"   x10 6''10x   Three way  2# 2x10 ea X10 ea 2# 2x10  X10 2# ea 2x10 2.5# ea raisa  2x10 2.5# ea raisa 2x10 2.5# ea raisa 2x10 3# ea raisa 1.5#  2x10 ea 2# 2x10   Nu step  L5x 10 min  L3 x10 min L5 x10 min L4 x10 min L5 x10 min  L5 x10 min  L5 x10 min  L5 x10 min L5 x10 min L5 x 10 min    Incline board  Calf stretch  30''3x      30''3x  30''3x   HS stretch seated over step  30''3x                        Ther Activity             Sidestepping                           Gait Training                                       Modalities                                         HEP access code: [email or web address] (updated 4/28/25)                                   "

## 2025-06-11 ENCOUNTER — OFFICE VISIT (OUTPATIENT)
Dept: PHYSICAL THERAPY | Facility: CLINIC | Age: 75
End: 2025-06-11
Attending: INTERNAL MEDICINE
Payer: MEDICARE

## 2025-06-11 DIAGNOSIS — M25.561 CHRONIC PAIN OF BOTH KNEES: ICD-10-CM

## 2025-06-11 DIAGNOSIS — R29.6 FALLS: Primary | ICD-10-CM

## 2025-06-11 DIAGNOSIS — R26.9 GAIT ABNORMALITY: ICD-10-CM

## 2025-06-11 DIAGNOSIS — G89.29 CHRONIC PAIN OF BOTH KNEES: ICD-10-CM

## 2025-06-11 DIAGNOSIS — M25.562 CHRONIC PAIN OF BOTH KNEES: ICD-10-CM

## 2025-06-11 PROCEDURE — 97110 THERAPEUTIC EXERCISES: CPT | Performed by: PHYSICAL THERAPIST

## 2025-06-11 PROCEDURE — 97140 MANUAL THERAPY 1/> REGIONS: CPT | Performed by: PHYSICAL THERAPIST

## 2025-06-11 PROCEDURE — 97112 NEUROMUSCULAR REEDUCATION: CPT | Performed by: PHYSICAL THERAPIST

## 2025-06-11 NOTE — PROGRESS NOTES
"Daily Note     Today's date: 2025  Patient name: Regino Mitchell  : 1950  MRN: [id number]  Referring provider: Reg Palomares DO  Dx:   Encounter Diagnosis     ICD-10-CM    1. Falls  R29.6       2. Chronic pain of both knees  M25.561     M25.562     G89.29       3. Gait abnormality  R26.9                      Subjective: Pt states his right side is sore today. He feels that it is because he didn't take his medication.       Objective: See treatment diary below      Assessment: Tolerated treatment well. Patient exhibited good technique with therapeutic exercises and would benefit from continued PT treatment limited this date d/t complaints of right hip pain. Tolerated all outlined TE well without any exacerbation of sx.        Plan: Continue per plan of care.        POC Expires Reeval for Medicare to be completed Unit LImit Auth Expiration Date PT/OT/STVisit Limit    By visit 10       Completed on                  VISIT TRACKER  DATE  RE   Visit # 11 12 13 14 15 16 17 18 19 10   Remaining 9 8 7 6 5 4 3 2 1     V  Precautions: Hx of seizure, falls risk. Pt is [place]      Manuals    Hamstring, calf B/L MJC  MJC  MJC TM TM RR RR                                           Neuro Re-Ed             Hip add 5\" x20 5\" x20 5''20x 5\" x20 5\" x20 5\" x20 5\" x20 5''20x 5\" x20 5''20x   Hip abd  Grey  5\" x20   GTB  5\" x20 Grey 5''20x Grey   5\" x20 Grey  5\" x20 Grey  5\" x20 5\" x20 Grey TB 5\" x20 Grey TB Grey  5\" x20 Morris 5''20x   LAQ 1# x20 5\" 2# 2x10 2# 5''20x 2# 5''20x 5\" x20  2.5# 5\" x20  2.5# 5\" x20  2.5# 5\" x20  3# 5\" x20  3# 5''20x   March  Standing   2# x20 Standing   2# x20 Standing   2# x20 ea Standing   2# x20 ea Standing  2.5# 2x10 raisa  Standing  2.5# 2x10 raisa  Standing  2.5# 2x10 raisa  Standing  3# 2x10 raisa  Stand march 3# x20 Stand march 1.5# x20                                          Ther Ex             HR  x20 x20 " "x20 x20 x20 x20 Resume NV 20x x20 x20   TR x20 x20 x20 x20 x20 x20 Resume NV 20x x20 x20   Mini Squat  X10 sit to stand X10  X15  2x10 2x10 2x10 2x10 2x10  10x   Step up  X15 ea  6\" step NV X15 ea  6\" step 6\" x10 ea 6\" 2x10  raisa  6\" 2x10  raisa  Resume NV 6\" 2x10  raisa   6''10x   Three way  2# 2x10 ea X10 ea 2# 2x10  X10 2# ea 2x10 2.5# ea raisa  2x10 2.5# ea raisa 2x10 2.5# ea raisa 2x10 3# ea raisa 2x10 3# 2# 2x10   Nu step  L5x 10 min  L3 x10 min L5 x10 min L4 x10 min L5 x10 min  L5 x10 min  L5 x10 min  L5 x10 min L5 x10 min L5 x 10 min    Incline board  Calf stretch  30''3x      30''3x  30''3x   HS stretch seated over step  30''3x                        Ther Activity             Sidestepping                           Gait Training                                       Modalities                                         HEP access code: [email or web address] (updated 4/28/25)                                     "

## 2025-06-12 ENCOUNTER — OFFICE VISIT (OUTPATIENT)
Dept: INTERNAL MEDICINE CLINIC | Facility: CLINIC | Age: 75
End: 2025-06-12
Payer: MEDICARE

## 2025-06-12 ENCOUNTER — APPOINTMENT (OUTPATIENT)
Dept: LAB | Facility: CLINIC | Age: 75
End: 2025-06-12
Payer: MEDICARE

## 2025-06-12 VITALS
OXYGEN SATURATION: 97 % | DIASTOLIC BLOOD PRESSURE: 74 MMHG | HEIGHT: 65 IN | WEIGHT: 148 LBS | BODY MASS INDEX: 24.66 KG/M2 | HEART RATE: 72 BPM | SYSTOLIC BLOOD PRESSURE: 126 MMHG

## 2025-06-12 DIAGNOSIS — R56.9 SEIZURE (HCC): ICD-10-CM

## 2025-06-12 DIAGNOSIS — R26.9 GAIT ABNORMALITY: Primary | ICD-10-CM

## 2025-06-12 DIAGNOSIS — E03.9 ACQUIRED HYPOTHYROIDISM: ICD-10-CM

## 2025-06-12 DIAGNOSIS — R29.6 FALLS: ICD-10-CM

## 2025-06-12 DIAGNOSIS — G89.29 CHRONIC PAIN OF BOTH KNEES: ICD-10-CM

## 2025-06-12 DIAGNOSIS — Z12.11 SCREENING FOR COLORECTAL CANCER: ICD-10-CM

## 2025-06-12 DIAGNOSIS — M25.562 CHRONIC PAIN OF BOTH KNEES: ICD-10-CM

## 2025-06-12 DIAGNOSIS — Z12.12 SCREENING FOR COLORECTAL CANCER: ICD-10-CM

## 2025-06-12 DIAGNOSIS — M25.561 CHRONIC PAIN OF BOTH KNEES: ICD-10-CM

## 2025-06-12 LAB
TSH SERPL DL<=0.05 MIU/L-ACNC: 1.25 UIU/ML (ref 0.45–4.5)
VALPROATE SERPL-MCNC: 107 ÂΜG/ML (ref 50–125)

## 2025-06-12 PROCEDURE — 36415 COLL VENOUS BLD VENIPUNCTURE: CPT

## 2025-06-12 PROCEDURE — 99213 OFFICE O/P EST LOW 20 MIN: CPT | Performed by: INTERNAL MEDICINE

## 2025-06-12 PROCEDURE — G2211 COMPLEX E/M VISIT ADD ON: HCPCS | Performed by: INTERNAL MEDICINE

## 2025-06-12 PROCEDURE — 80164 ASSAY DIPROPYLACETIC ACD TOT: CPT

## 2025-06-12 PROCEDURE — 84443 ASSAY THYROID STIM HORMONE: CPT

## 2025-06-12 PROCEDURE — G0439 PPPS, SUBSEQ VISIT: HCPCS | Performed by: INTERNAL MEDICINE

## 2025-06-12 NOTE — PATIENT INSTRUCTIONS
Medicare Preventive Visit Patient Instructions  Thank you for completing your Welcome to Medicare Visit or Medicare Annual Wellness Visit today. Your next wellness visit will be due in one year (6/13/2026).  The screening/preventive services that you may require over the next 5-10 years are detailed below. Some tests may not apply to you based off risk factors and/or age. Screening tests ordered at today's visit but not completed yet may show as past due. Also, please note that scanned in results may not display below.  Preventive Screenings:  Service Recommendations Previous Testing/Comments   Colorectal Cancer Screening  Colonoscopy    Fecal Occult Blood Test (FOBT)/Fecal Immunochemical Test (FIT)  Fecal DNA/Cologuard Test  Flexible Sigmoidoscopy Age: 45-75 years old   Colonoscopy: every 10 years (May be performed more frequently if at higher risk)  OR  FOBT/FIT: every 1 year  OR  Cologuard: every 3 years  OR  Sigmoidoscopy: every 5 years  Screening may be recommended earlier than age 45 if at higher risk for colorectal cancer. Also, an individualized decision between you and your healthcare provider will decide whether screening between the ages of 76-85 would be appropriate. Colonoscopy: Not on file  FOBT/FIT: Not on file  Cologuard: Not on file  Sigmoidoscopy: Not on file          Prostate Cancer Screening Individualized decision between patient and health care provider in men between ages of 55-69   Medicare will cover every 12 months beginning on the day after your 50th birthday PSA: 0.695 ng/mL     Screening Not Indicated     Hepatitis C Screening Once for adults born between 1945 and 1965  More frequently in patients at high risk for Hepatitis C Hep C Antibody: 04/14/2022    Screening Current   Diabetes Screening 1-2 times per year if you're at risk for diabetes or have pre-diabetes Fasting glucose: 96 mg/dL (3/19/2025)  A1C: 5.7 % (9/19/2024)  Screening Current   Cholesterol Screening Once every 5 years if  you don't have a lipid disorder. May order more often based on risk factors. Lipid panel: 09/19/2024  Screening Not Indicated  History Lipid Disorder      Other Preventive Screenings Covered by Medicare:  Abdominal Aortic Aneurysm (AAA) Screening: covered once if your at risk. You're considered to be at risk if you have a family history of AAA or a male between the age of 65-75 who smoking at least 100 cigarettes in your lifetime.  Lung Cancer Screening: covers low dose CT scan once per year if you meet all of the following conditions: (1) Age 55-77; (2) No signs or symptoms of lung cancer; (3) Current smoker or have quit smoking within the last 15 years; (4) You have a tobacco smoking history of at least 20 pack years (packs per day x number of years you smoked); (5) You get a written order from a healthcare provider.  Glaucoma Screening: covered annually if you're considered high risk: (1) You have diabetes OR (2) Family history of glaucoma OR (3)  aged 50 and older OR (4)  American aged 65 and older  Osteoporosis Screening: covered every 2 years if you meet one of the following conditions: (1) Have a vertebral abnormality; (2) On glucocorticoid therapy for more than 3 months; (3) Have primary hyperparathyroidism; (4) On osteoporosis medications and need to assess response to drug therapy.  HIV Screening: covered annually if you're between the age of 15-65. Also covered annually if you are younger than 15 and older than 65 with risk factors for HIV infection. For pregnant patients, it is covered up to 3 times per pregnancy.    Immunizations:  Immunization Recommendations   Influenza Vaccine Annual influenza vaccination during flu season is recommended for all persons aged >= 6 months who do not have contraindications   Pneumococcal Vaccine   * Pneumococcal conjugate vaccine = PCV13 (Prevnar 13), PCV15 (Vaxneuvance), PCV20 (Prevnar 20)  * Pneumococcal polysaccharide vaccine = PPSV23  (Pneumovax) Adults 19-63 yo with certain risk factors or if 65+ yo  If never received any pneumonia vaccine: recommend Prevnar 20 (PCV20)  Give PCV20 if previously received 1 dose of PCV13 or PPSV23   Hepatitis B Vaccine 3 dose series if at intermediate or high risk (ex: diabetes, end stage renal disease, liver disease)   Respiratory syncytial virus (RSV) Vaccine - COVERED BY MEDICARE PART D  * RSVPreF3 (Arexvy) CDC recommends that adults 60 years of age and older may receive a single dose of RSV vaccine using shared clinical decision-making (SCDM)   Tetanus (Td) Vaccine - COST NOT COVERED BY MEDICARE PART B Following completion of primary series, a booster dose should be given every 10 years to maintain immunity against tetanus. Td may also be given as tetanus wound prophylaxis.   Tdap Vaccine - COST NOT COVERED BY MEDICARE PART B Recommended at least once for all adults. For pregnant patients, recommended with each pregnancy.   Shingles Vaccine (Shingrix) - COST NOT COVERED BY MEDICARE PART B  2 shot series recommended in those 19 years and older who have or will have weakened immune systems or those 50 years and older     Health Maintenance Due:      Topic Date Due   • Colorectal Cancer Screening  05/11/2025   • Hepatitis C Screening  Completed     Immunizations Due:      Topic Date Due   • COVID-19 Vaccine (1 - 2024-25 season) Never done   • Influenza Vaccine (Season Ended) 09/01/2025     Advance Directives   What are advance directives?  Advance directives are legal documents that state your wishes and plans for medical care. These plans are made ahead of time in case you lose your ability to make decisions for yourself. Advance directives can apply to any medical decision, such as the treatments you want, and if you want to donate organs.   What are the types of advance directives?  There are many types of advance directives, and each state has rules about how to use them. You may choose a combination of any  of the following:  Living will:  This is a written record of the treatment you want. You can also choose which treatments you do not want, which to limit, and which to stop at a certain time. This includes surgery, medicine, IV fluid, and tube feedings.   Durable power of  for healthcare (DPAHC):  This is a written record that states who you want to make healthcare choices for you when you are unable to make them for yourself. This person, called a proxy, is usually a family member or a friend. You may choose more than 1 proxy.  Do not resuscitate (DNR) order:  A DNR order is used in case your heart stops beating or you stop breathing. It is a request not to have certain forms of treatment, such as CPR. A DNR order may be included in other types of advance directives.  Medical directive:  This covers the care that you want if you are in a coma, near death, or unable to make decisions for yourself. You can list the treatments you want for each condition. Treatment may include pain medicine, surgery, blood transfusions, dialysis, IV or tube feedings, and a ventilator (breathing machine).  Values history:  This document has questions about your views, beliefs, and how you feel and think about life. This information can help others choose the care that you would choose.  Why are advance directives important?  An advance directive helps you control your care. Although spoken wishes may be used, it is better to have your wishes written down. Spoken wishes can be misunderstood, or not followed. Treatments may be given even if you do not want them. An advance directive may make it easier for your family to make difficult choices about your care.       © Copyright [employer] 2018 Information is for End User's use only and may not be sold, redistributed or otherwise used for commercial purposes. All illustrations and images included in CareNotes® are the copyrighted property of A.D.A.M., Inc. or [employer]

## 2025-06-12 NOTE — PROGRESS NOTES
"Name: Regino Mitchell      : 1950      MRN: [id number]  Encounter Provider: Reg Palomares DO  Encounter Date: 2025   Encounter department: [facility]  :  Assessment & Plan  Screening for colorectal cancer    Orders:    Cologuard    Gait abnormality         Falls         Chronic pain of both knees         Acquired hypothyroidism    Orders:    TSH, 3rd generation; Future    Seizure (HCC)    Orders:    Valproic acid level, total; Future           History of Present Illness   WM RTC unescorted for f/u multiple falls, knee pain, and labs. Started PT , mobic,  and referred to ortho. Reports doing better. Gait improving and tolerating the med. Xrays with severe DJD. No falls. Knees injected by ortho. NO pain at this time. Labs ok except depakote level off. Meds adjusted. No sx activity to report. No new issues.         Review of Systems   Constitutional:  Negative for activity change, chills, diaphoresis, fatigue and fever.   HENT: Negative.     Eyes:  Negative for visual disturbance.   Respiratory:  Negative for cough, chest tightness, shortness of breath and wheezing.    Cardiovascular:  Negative for chest pain, palpitations and leg swelling.   Gastrointestinal:  Negative for abdominal pain, constipation, diarrhea, nausea and vomiting.   Endocrine: Negative for cold intolerance and heat intolerance.   Genitourinary:  Negative for difficulty urinating, dysuria and frequency.   Musculoskeletal:  Negative for arthralgias, gait problem and myalgias.   Neurological:  Negative for dizziness, seizures, syncope, weakness, light-headedness and headaches.   Psychiatric/Behavioral:  Negative for confusion, dysphoric mood and sleep disturbance. The patient is not nervous/anxious.        Objective   /74   Pulse 72   Ht 5' 5\" (1.651 m)   Wt 67.1 kg (148 lb)   SpO2 97%   BMI 24.63 kg/m²      Physical Exam  Vitals and nursing note reviewed.   Constitutional:       General: He is not in acute " distress.     Appearance: Normal appearance. He is not ill-appearing.   HENT:      Head: Normocephalic and atraumatic.      Mouth/Throat:      Mouth: Mucous membranes are moist.     Eyes:      Extraocular Movements: Extraocular movements intact.      Conjunctiva/sclera: Conjunctivae normal.      Pupils: Pupils are equal, round, and reactive to light.     Neck:      Vascular: No carotid bruit.     Cardiovascular:      Rate and Rhythm: Normal rate and regular rhythm.      Heart sounds: Normal heart sounds. No murmur heard.  Pulmonary:      Effort: Pulmonary effort is normal. No respiratory distress.      Breath sounds: Normal breath sounds. No wheezing, rhonchi or rales.   Abdominal:      General: There is no distension.      Palpations: Abdomen is soft.      Tenderness: There is no abdominal tenderness.     Musculoskeletal:      Cervical back: Neck supple.      Right lower leg: Edema present.      Left lower leg: Edema present.      Comments: Bilat LE edema 1/4     Neurological:      General: No focal deficit present.      Mental Status: He is alert and oriented to person, place, and time. Mental status is at baseline.     Psychiatric:         Mood and Affect: Mood normal.         Behavior: Behavior normal.         Thought Content: Thought content normal.         Judgment: Judgment normal.         A/P: Doing better. Appreciate PT and ORtho input. Discussed labs and will recheck Depakote leverl. Refer for CRC. Continue current treatment and RTC 3 months for routine.

## 2025-06-12 NOTE — PROGRESS NOTES
Name: Regino Mitchell      : 1950      MRN: [id number]  Encounter Provider: Reg Palomares DO  Encounter Date: 2025   Encounter department: [facility]  :  Assessment & Plan  Screening for colorectal cancer    Orders:    Cologuard    Gait abnormality         Falls         Chronic pain of both knees         Acquired hypothyroidism    Orders:    TSH, 3rd generation; Future    Seizure (HCC)    Orders:    Valproic acid level, total; Future       Preventive health issues were discussed with patient, and age appropriate screening tests were ordered as noted in patient's After Visit Summary. Personalized health advice and appropriate referrals for health education or preventive services given if needed, as noted in patient's After Visit Summary.    History of Present Illness     WM RTC unescorted for f/u multiple falls, knee pain, and labs. Started PT , mobic,  and referred to ortho. Reports doing better. Gait improving and tolerating the med. Xrays with severe DJD. No falls. Knees injected by ortho. NO pain at this time. Labs ok except depakote level off. Meds adjusted. No sx activity to report. No new issues.        Patient Care Team:  Reg Palomares DO as PCP - General (Internal Medicine)    Review of Systems   Constitutional:  Negative for activity change, chills, diaphoresis, fatigue and fever.   Respiratory:  Negative for cough, chest tightness, shortness of breath and wheezing.    Cardiovascular:  Negative for chest pain, palpitations and leg swelling.   Gastrointestinal:  Negative for abdominal pain, constipation, diarrhea, nausea and vomiting.   Genitourinary:  Negative for difficulty urinating, dysuria and frequency.   Musculoskeletal:  Negative for arthralgias, gait problem and myalgias.   Neurological:  Negative for dizziness, seizures, syncope, weakness, light-headedness and headaches.   Psychiatric/Behavioral:  Negative for confusion, dysphoric mood and sleep disturbance. The  patient is not nervous/anxious.      Medical History Reviewed by provider this encounter:  Tobacco  Allergies  Meds  Problems  Med Hx  Surg Hx  Fam Hx       Annual Wellness Visit Questionnaire   Regino is here for his Subsequent Wellness visit. Last Medicare Wellness visit information reviewed, patient interviewed and updates made to the record today.      Health Risk Assessment:   Patient rates overall health as good. Patient feels that their physical health rating is slightly better. Patient is very satisfied with their life. Eyesight was rated as same. Hearing was rated as slightly worse. Patient feels that their emotional and mental health rating is slightly better. Patients states they are never, rarely angry. Patient states they are sometimes unusually tired/fatigued. Pain experienced in the last 7 days has been none. Patient states that he has experienced no weight loss or gain in last 6 months.     Depression Screening:   PHQ-2 Score: 0      Fall Risk Screening:   In the past year, patient has experienced: no history of falling in past year      Home Safety:  Patient does not have trouble with stairs inside or outside of their home. Patient has working smoke alarms and has working carbon monoxide detector. Home safety hazards include: none.     Nutrition:   Current diet is Regular.     Medications:   Patient is currently taking over-the-counter supplements. OTC medications include: see medication list. Patient is able to manage medications.     Activities of Daily Living (ADLs)/Instrumental Activities of Daily Living (IADLs):   Walk and transfer into and out of bed and chair?: Yes  Dress and groom yourself?: Yes    Bathe or shower yourself?: Yes    Feed yourself? Yes  Do your laundry/housekeeping?: Yes  Manage your money, pay your bills and track your expenses?: Yes  Make your own meals?: Yes    Do your own shopping?: Yes    Previous Hospitalizations:   Any hospitalizations or ED visits within the last  12 months?: No      Advance Care Planning:   Living will: Yes    Durable POA for healthcare: Yes    Advanced directive: Yes    Advanced directive counseling given: Yes    Five wishes given: No    Patient declined ACP directive: No    End of Life Decisions reviewed with patient: Yes    Provider agrees with end of life decisions: Yes      Cognitive Screening:   Provider or family/friend/caregiver concerned regarding cognition?: No    Preventive Screenings      Cardiovascular Screening:    General: Screening Not Indicated, History Lipid Disorder and Screening Current      Diabetes Screening:     General: Screening Current      Colorectal Cancer Screening:     General: Screening Current      Prostate Cancer Screening:    General: Screening Not Indicated and Screening Current      Osteoporosis Screening:    General: Screening Not Indicated      Abdominal Aortic Aneurysm (AAA) Screening:    Risk factors include: age between 65-74 yo and tobacco use        Lung Cancer Screening:     General: Screening Not Indicated      Hepatitis C Screening:    General: Screening Current    Immunizations:  - Immunizations due: Zoster (Shingrix)    Screening, Brief Intervention, and Referral to Treatment (SBIRT)     Screening  Typical number of drinks in a day: 0  Typical number of drinks in a week: 0  Interpretation: Low risk drinking behavior.    Other Counseling Topics:   Car/seat belt/driving safety, sunscreen and regular weightbearing exercise and calcium and vitamin D intake.     Social Drivers of Health     Financial Resource Strain: Low Risk  (5/30/2023)    Overall Financial Resource Strain (CARDIA)     Difficulty of Paying Living Expenses: Not hard at all   Food Insecurity: No Food Insecurity (6/12/2025)    Nursing - Inadequate Food Risk Classification     Worried About Running Out of Food in the Last Year: Never true     Ran Out of Food in the Last Year: Never true   Transportation Needs: No Transportation Needs (6/12/2025)     "PRAPARE - Transportation     Lack of Transportation (Medical): No     Lack of Transportation (Non-Medical): No   Housing Stability: Low Risk  (6/12/2025)    Housing Stability Vital Sign     Unable to Pay for Housing in the Last Year: No     Number of Times Moved in the Last Year: 0     Homeless in the Last Year: No   Utilities: Not At Risk (6/12/2025)    [facility] Utilities     Threatened with loss of utilities: No     No results found.    Objective   /74   Pulse 72   Ht 5' 5\" (1.651 m)   Wt 67.1 kg (148 lb)   SpO2 97%   BMI 24.63 kg/m²     Physical Exam  Vitals and nursing note reviewed.   Constitutional:       General: He is not in acute distress.     Appearance: Normal appearance. He is not ill-appearing.   HENT:      Head: Normocephalic and atraumatic.      Mouth/Throat:      Mouth: Mucous membranes are moist.     Eyes:      Extraocular Movements: Extraocular movements intact.      Conjunctiva/sclera: Conjunctivae normal.      Pupils: Pupils are equal, round, and reactive to light.       Cardiovascular:      Rate and Rhythm: Normal rate and regular rhythm.      Heart sounds: Normal heart sounds. No murmur heard.  Pulmonary:      Effort: Pulmonary effort is normal. No respiratory distress.      Breath sounds: Normal breath sounds. No wheezing, rhonchi or rales.     Musculoskeletal:         General: Tenderness present.      Right lower leg: Edema present.      Left lower leg: Edema present.      Comments: Bilat LE edema 1/4     Neurological:      General: No focal deficit present.      Mental Status: He is alert and oriented to person, place, and time. Mental status is at baseline.     Psychiatric:         Mood and Affect: Mood normal.         Behavior: Behavior normal.         Thought Content: Thought content normal.         Judgment: Judgment normal.           A/P: Doing well and no falls reported. Denies depression and feels safe at home. Diverse diet. Doesn't drive, but uses seat belts. Has a living will " and POA. No DME or referrals needed today. RTC one year for medicare wellness.

## 2025-06-13 ENCOUNTER — RESULTS FOLLOW-UP (OUTPATIENT)
Dept: INTERNAL MEDICINE CLINIC | Facility: CLINIC | Age: 75
End: 2025-06-13

## 2025-06-16 ENCOUNTER — EVALUATION (OUTPATIENT)
Dept: PHYSICAL THERAPY | Facility: CLINIC | Age: 75
End: 2025-06-16
Attending: INTERNAL MEDICINE
Payer: MEDICARE

## 2025-06-16 DIAGNOSIS — M25.561 CHRONIC PAIN OF BOTH KNEES: ICD-10-CM

## 2025-06-16 DIAGNOSIS — R29.6 FALLS: Primary | ICD-10-CM

## 2025-06-16 DIAGNOSIS — G89.29 CHRONIC PAIN OF BOTH KNEES: ICD-10-CM

## 2025-06-16 DIAGNOSIS — R26.9 GAIT ABNORMALITY: ICD-10-CM

## 2025-06-16 DIAGNOSIS — M25.562 CHRONIC PAIN OF BOTH KNEES: ICD-10-CM

## 2025-06-16 PROCEDURE — 97112 NEUROMUSCULAR REEDUCATION: CPT | Performed by: PHYSICAL THERAPIST

## 2025-06-16 PROCEDURE — 97110 THERAPEUTIC EXERCISES: CPT | Performed by: PHYSICAL THERAPIST

## 2025-06-16 NOTE — PROGRESS NOTES
PT Progress Note     Today's date: 2025  Patient name: Regino Mitchell  : 1950  MRN: [id number]  Referring provider: Reg Palomares DO  Dx:   Encounter Diagnosis     ICD-10-CM    1. Falls  R29.6       2. Chronic pain of both knees  M25.561     M25.562     G89.29       3. Gait abnormality  R26.9               Assessment  Impairments: abnormal coordination, abnormal gait, abnormal muscle firing, abnormal muscle tone, abnormal or restricted ROM, abnormal movement, activity intolerance, impaired physical strength, lacks appropriate home exercise program, pain with function, safety issue, weight-bearing intolerance and poor posture   Functional limitations: difficulty with amb, stair negotiation  Symptom irritability: moderate    Assessment details: Regino Mitchell is a 75 y.o. male who presents to outpatient PT with a  Falls  Chronic pain of both knees  Gait abnormality.  No further referral appears necessary at this time based upon examination results. Pt presents with decreased strength, ROM, balance, functional activity tolerance, and pain with movement in his bilateral LE's  which is  limiting his ability to perform the aforementioned functional activities.  Etiologic factors include repetitive poor body mechanics. Prognosis is good given HEP compliance and PT 2-3/wk.  Please contact me if you have any questions or recommendations.  Thank you for the opportunity to share in  [facility] care.     RA     Regino Mitchell has demonstrated decreased pain, increased strength, increased range of motion, and increased activity tolerance since starting physical therapy services. He reports an overall improvement of 50% thus far.  He continues to present with pain, decreased strength, decreased range of motion, and decreased activity tolerance and would benefit from additional skilled physical therapy interventions to address impairments and maximize function.    RA       Regino Mitchell has demonstrated  decreased pain, increased strength, increased range of motion, and increased activity tolerance since starting physical therapy services. He reports an overall improvement of 60% thus far.  Pt demonstrates slight improvements in functional activity tolerance, evidenced by improvements in objective measures He continues to present with pain, decreased strength, decreased range of motion, and decreased activity tolerance and would benefit from additional skilled physical therapy interventions to address impairments and maximize function.          Understanding of Dx/Px/POC: good     Prognosis: good    Goals  STG to be achieved in 4 weeks     1. Pt will reduce 5x STS score to 16 seconds indicating reduced risk for falls   Progressing   2. Pt will improve 6 MWT score to 300 feet indicating improved endurance   Met   3. Pt will improve MMT scores by at least 1/2 grade to promote improved functional activity tolerance   Met      LTG to be achieved in 6-8 weeks   1. Pt will be complaint with HEP   Progressing   2. Pt will improve ROM to WFL, to help facilitate independence with ADL's, IADL's, and functional activities   Progressing   3. Pt will improve Strength to WFL to help facilitate independence with ADL's, IADL's, and functional activities   Progressing   4. Pt will have no limitations with ambulation to help facilitate independence at home and in the community.   Progressing   5. Pt will have no limitations with stair negotiation to help facilitate independence at home and in the community   Progressing           Plan  Patient would benefit from: skilled physical therapy    Planned therapy interventions: ADL training, balance, balance/weight bearing training, flexibility, functional ROM exercises, gait training, graded activity, graded exercise, graded motor, home exercise program, joint mobilization, manual therapy, neuromuscular re-education, patient education, postural training, strengthening, stretching,  therapeutic activities and therapeutic exercise    Frequency: 2x week  Duration in weeks: 12  Plan of Care beginning date: 2025  Plan of Care expiration date: 2025  Treatment plan discussed with: patient, PTA and referring physician        Subjective Evaluation    History of Present Illness  Mechanism of injury: 75 year old male male presents to outpatient PT with a CC of generalized weakness of bilateral leg pain and generalized weakness.     Reports difficulty with walking, and can't walk as good as before    He does note that he has to use a walker on occasion    Goals: to get legs stronger.   Patient Goals  Patient goals for therapy: increased strength, decreased pain and increased motion  Patient goal: to have less pain and walk better  Pain           Current pain ratin  0  0  At best pain ratin  0  0  At worst pain ratin  0  0  Quality: dull ache  Relieving factors: medications  Aggravating factors: standing and walking  Progression: worsening          Objective     Active Range of Motion   Left Hip   Flexion: WFL  Abduction: WFL  Adduction: WFL    Right Hip   Flexion: WFL  Abduction: WFL  Adduction: WFL  Left Knee   Flexion: WFL  Extension: WFL    Right Knee   Flexion: WFL  Extension: WFL  Left Ankle/Foot   Dorsiflexion (kf): WFL  Plantar flexion: WFL    Right Ankle/Foot   Dorsiflexion (kf): WFL  Plantar flexion: WFL    Strength/Myotome Testing            Left Hip   Planes of Motion   Flexion: 4-  4  4  Abduction: 4-  4  4  Adduction: 4-  4  4    Right Hip   Planes of Motion   Flexion: 4-  4  4  Abduction: 4-  4  4  Adduction: 4-  4  4    Left Knee   Flexion: 4-  4  4  Extension: 4-  4  4    Right Knee   Flexion: 4-  4  4  Extension: 4-  4  4     Left Ankle/Foot   Dorsiflexion: 4- 4  4  Plantar flexion: 4- 4  4    Right Ankle/Foot   Dorsiflexion: 4- 4  4  Plantar flexion: 4- 4  4    Ambulation     Comments   5x STS -  22 seconds no arm rests       19  "seconds, Bilateral arms     RA 6/16 20 seconds Bilateral Arms        TUG 13 seconds - No device     RA 5/13 NP WFL     6 MWT - 135 feet no ad, shuffling gait pattern     RA 5/13 320 feet, No device     RA 6/16  380 feet, No device shuffling gait pattern.               POC Expires Reeval for Medicare to be completed Unit LImit Auth Expiration Date PT/OT/STVisit Limit   7/9 By visit 10       Completed on                  VISIT TRACKER  DATE 5/14 5/19 5/21 5/27 5/29 6/2 6/5 6/9 6/11 6/16   Visit # 11 12 13 14 15 16 17 18 19 20   Remaining 9 8 7 6 5 4 3 2 1     V  Precautions: Hx of seizure, falls risk. Pt is [place]      Manuals 5/14 5/19 5/21 5/27 5/29 6/2 6/5 6/9 6/11 6/16   Hamstring, calf B/L MJC  MJC  MJC TM TM RR RR NP time                                           Neuro Re-Ed             Hip add 5\" x20 5\" x20 5''20x 5\" x20 5\" x20 5\" x20 5\" x20 5''20x 5\" x20 5''20x   Hip abd  Grey  5\" x20   GTB  5\" x20 Grey 5''20x Grey   5\" x20 Grey  5\" x20 Grey  5\" x20 5\" x20 Grey TB 5\" x20 Grey TB Grey  5\" x20 Morris 5''20x   LAQ 1# x20 5\" 2# 2x10 2# 5''20x 2# 5''20x 5\" x20  2.5# 5\" x20  2.5# 5\" x20  2.5# 5\" x20  3# 5\" x20  3# 5''20x 3#    March  Standing   2# x20 Standing   2# x20 Standing   2# x20 ea Standing   2# x20 ea Standing  2.5# 2x10 raisa  Standing  2.5# 2x10 raisa  Standing  2.5# 2x10 raisa  Standing  3# 2x10 raisa  Stand march 3# x20 Stand march 3# x20                                          Ther Ex             HR  x20 x20 x20 x20 x20 x20 Resume NV 20x x20 x20   TR x20 x20 x20 x20 x20 x20 Resume NV 20x x20 x20   Mini Squat  X10 sit to stand X10  X15  2x10 2x10 2x10 2x10 2x10  10x   Step up  X15 ea  6\" step NV X15 ea  6\" step 6\" x10 ea 6\" 2x10  raisa  6\" 2x10  raisa  Resume NV 6\" 2x10  raisa   6''10x   Three way  2# 2x10 ea X10 ea 2# 2x10  X10 2# ea 2x10 2.5# ea raisa  2x10 2.5# ea raisa 2x10 2.5# ea raisa 2x10 3# ea raisa 2x10 3# 3# 2x10   Nu step  L5x 10 min  L3 x10 min L5 x10 min L4 x10 min L5 x10 min  L5 x10 min  L5 x10 min  L5 x10 min L5 " x10 min L5 x 10 min    Incline board  Calf stretch  30''3x      30''3x     HS stretch seated over step  30''3x                        Ther Activity             Sidestepping                           Gait Training                                       Modalities                                         HEP access code: [email or web address] (updated 4/28/25)

## 2025-06-18 ENCOUNTER — OFFICE VISIT (OUTPATIENT)
Dept: PHYSICAL THERAPY | Facility: CLINIC | Age: 75
End: 2025-06-18
Attending: INTERNAL MEDICINE
Payer: MEDICARE

## 2025-06-18 DIAGNOSIS — M25.562 CHRONIC PAIN OF BOTH KNEES: ICD-10-CM

## 2025-06-18 DIAGNOSIS — R29.6 FALLS: Primary | ICD-10-CM

## 2025-06-18 DIAGNOSIS — G89.29 CHRONIC PAIN OF BOTH KNEES: ICD-10-CM

## 2025-06-18 DIAGNOSIS — M25.561 CHRONIC PAIN OF BOTH KNEES: ICD-10-CM

## 2025-06-18 PROCEDURE — 97112 NEUROMUSCULAR REEDUCATION: CPT

## 2025-06-18 PROCEDURE — 97110 THERAPEUTIC EXERCISES: CPT

## 2025-06-18 PROCEDURE — 97140 MANUAL THERAPY 1/> REGIONS: CPT

## 2025-06-18 NOTE — PROGRESS NOTES
"Daily Note     Today's date: 2025  Patient name: Regino Mitchell  : 1950  MRN: [id number]  Referring provider: Reg Palomares DO  Dx:   Encounter Diagnosis     ICD-10-CM    1. Falls  R29.6       2. Chronic pain of both knees  M25.561     M25.562     G89.29                      Subjective: Pt denies any recent falls. Pt reports his knees get sore after walking the dogs approx a mile daily.       Objective: See treatment diary below      Assessment: Tolerated treatment well. Pt demonstrates improved transfer sit to stand without UE support. Patient demonstrated fatigue post treatment, exhibited good technique with therapeutic exercises, and would benefit from continued PT      Plan: Continue per plan of care.        POC Expires Reeval for Medicare to be completed Unit LImit Auth Expiration Date PT/OT/STVisit Limit    By visit 10       Completed on                  VISIT TRACKER  DATE    Visit # 21 12 13 14 15 16 17 18 19 20   Remaining 3 8 7 6 5 4 3 2 1 2    V  Precautions: Hx of seizure, falls risk. Pt is [place]      Manuals    Hamstring, calf B/L MJC  MJC  MJC TM TM RR RR NP time                                           Neuro Re-Ed             Hip add 5\" x20 5\" x20 5''20x 5\" x20 5\" x20 5\" x20 5\" x20 5''20x 5\" x20 5''20x   Hip abd  Grey  5\" x20   GTB  5\" x20 Grey 5''20x Grey   5\" x20 Grey  5\" x20 Grey  5\" x20 5\" x20 Grey TB 5\" x20 Grey TB Grey  5\" x20 Morris 5''20x   LAQ 3# x20 5\" 2# 2x10 2# 5''20x 2# 5''20x 5\" x20  2.5# 5\" x20  2.5# 5\" x20  2.5# 5\" x20  3# 5\" x20  3# 5''20x 3#    March  Standing   2# x20 Standing   2# x20 Standing   2# x20 ea Standing   2# x20 ea Standing  2.5# 2x10 raisa  Standing  2.5# 2x10 raisa  Standing  2.5# 2x10 raisa  Standing  3# 2x10 raisa  Stand march 3# x20 Stand march 3# x20                                          Ther Ex             HR  x20 x20 x20 x20 x20 x20 Resume NV 20x x20 x20   TR " "x20 x20 x20 x20 x20 x20 Resume NV 20x x20 x20   Mini Squat  X10 sit to stand no UE  X10  X15  2x10 2x10 2x10 2x10 2x10  10x   Step up  X15 ea  6\" step NV X15 ea  6\" step 6\" x10 ea 6\" 2x10  raisa  6\" 2x10  raisa  Resume NV 6\" 2x10  raisa   6''10x   Three way  3# 2x10 ea X10 ea 2# 2x10  X10 2# ea 2x10 2.5# ea raisa  2x10 2.5# ea raisa 2x10 2.5# ea raisa 2x10 3# ea raisa 2x10 3# 3# 2x10   Nu step  L5x 10 min  L3 x10 min L5 x10 min L4 x10 min L5 x10 min  L5 x10 min  L5 x10 min  L5 x10 min L5 x10 min L5 x 10 min    Incline board  Calf stretch  30''3x      30''3x     HS stretch seated over step  30''3x                        Ther Activity             Sidestepping                           Gait Training                                       Modalities                                         HEP access code: [email or web address] (updated 4/28/25)                                         "

## 2025-06-23 ENCOUNTER — OFFICE VISIT (OUTPATIENT)
Dept: PHYSICAL THERAPY | Facility: CLINIC | Age: 75
End: 2025-06-23
Attending: INTERNAL MEDICINE
Payer: MEDICARE

## 2025-06-23 DIAGNOSIS — G89.29 CHRONIC PAIN OF BOTH KNEES: ICD-10-CM

## 2025-06-23 DIAGNOSIS — R29.6 FALLS: Primary | ICD-10-CM

## 2025-06-23 DIAGNOSIS — M25.561 CHRONIC PAIN OF BOTH KNEES: ICD-10-CM

## 2025-06-23 DIAGNOSIS — M25.562 CHRONIC PAIN OF BOTH KNEES: ICD-10-CM

## 2025-06-23 DIAGNOSIS — R26.9 GAIT ABNORMALITY: ICD-10-CM

## 2025-06-23 LAB — COLOGUARD RESULT REPORTABLE: NORMAL

## 2025-06-23 PROCEDURE — 97112 NEUROMUSCULAR REEDUCATION: CPT

## 2025-06-23 PROCEDURE — 97110 THERAPEUTIC EXERCISES: CPT

## 2025-06-23 PROCEDURE — 97140 MANUAL THERAPY 1/> REGIONS: CPT

## 2025-06-23 NOTE — PROGRESS NOTES
"Daily Note     Today's date: 2025  Patient name: Regino Mitchell  : 1950  MRN: [id number]  Referring provider: Reg Palomares DO  Dx:   Encounter Diagnosis     ICD-10-CM    1. Falls  R29.6       2. Chronic pain of both knees  M25.561     M25.562     G89.29       3. Gait abnormality  R26.9                      Subjective: Pt reports his knees are getting worse. Plans to get surgery. Has an appt with Dr Prescott in July. Would like to finish PT this week.      Objective: See treatment diary below      Assessment: Tolerated treatment well. Patient demonstrated fatigue post treatment, exhibited good technique with therapeutic exercises, and would benefit from continued PT      Plan: Continue per plan of care.        POC Expires Reeval for Medicare to be completed Unit LImit Auth Expiration Date PT/OT/STVisit Limit    By visit 10       Completed on                  VISIT TRACKER  DATE    Visit # 21 22 13 14 15 16 17 18 19 20   Remaining 3 4 7 6 5 4 3 2 1 2    V  Precautions: Hx of seizure, falls risk. Pt is [place]      Manuals    Hamstring, calf B/L MJC MJC MJC  MJC TM TM RR RR NP time                                           Neuro Re-Ed             Hip add 5\" x20 5\" x20 5''20x 5\" x20 5\" x20 5\" x20 5\" x20 5''20x 5\" x20 5''20x   Hip abd  Grey  5\" x20   GTB  5\" x20 Grey 5''20x Grey   5\" x20 Grey  5\" x20 Grey  5\" x20 5\" x20 Grey TB 5\" x20 Grey TB Grey  5\" x20 Morris 5''20x   LAQ 3# x20 5\" 3# 2x10 2# 5''20x 2# 5''20x 5\" x20  2.5# 5\" x20  2.5# 5\" x20  2.5# 5\" x20  3# 5\" x20  3# 5''20x 3#    March  Standing   2# x20 Standing   3# x20 Standing   2# x20 ea Standing   2# x20 ea Standing  2.5# 2x10 raisa  Standing  2.5# 2x10 raisa  Standing  2.5# 2x10 raisa  Standing  3# 2x10 raisa  Stand march 3# x20 Stand march 3# x20                                          Ther Ex             HR  x20 x20 x20 x20 x20 x20 Resume NV 20x x20 x20   TR x20 " "x20 x20 x20 x20 x20 Resume NV 20x x20 x20   Mini Squat  X10 sit to stand no UE  NV X15  2x10 2x10 2x10 2x10 2x10  10x   Step up  X15 ea  6\" step X15   6\"  X15 ea  6\" step 6\" x10 ea 6\" 2x10  raisa  6\" 2x10  raisa  Resume NV 6\" 2x10  raisa   6''10x   Three way  3# 2x10 ea 3# 2X10 ea 2# 2x10  X10 2# ea 2x10 2.5# ea raisa  2x10 2.5# ea raisa 2x10 2.5# ea raisa 2x10 3# ea raisa 2x10 3# 3# 2x10   Nu step  L5x 10 min  L5x10 min L5 x10 min L4 x10 min L5 x10 min  L5 x10 min  L5 x10 min  L5 x10 min L5 x10 min L5 x 10 min    Incline board  Calf stretch        30''3x     HS stretch seated over step                          Ther Activity             Sidestepping                           Gait Training                                       Modalities                                         HEP access code: [email or web address] (updated 4/28/25)                                           "

## 2025-06-24 ENCOUNTER — HOSPITAL ENCOUNTER (EMERGENCY)
Facility: HOSPITAL | Age: 75
Discharge: HOME | End: 2025-06-24
Attending: EMERGENCY MEDICINE | Admitting: EMERGENCY MEDICINE
Payer: MEDICARE

## 2025-06-24 ENCOUNTER — APPOINTMENT (EMERGENCY)
Dept: CT IMAGING | Facility: HOSPITAL | Age: 75
End: 2025-06-24
Payer: MEDICARE

## 2025-06-24 VITALS
OXYGEN SATURATION: 99 % | HEART RATE: 74 BPM | SYSTOLIC BLOOD PRESSURE: 157 MMHG | TEMPERATURE: 99 F | DIASTOLIC BLOOD PRESSURE: 88 MMHG | RESPIRATION RATE: 18 BRPM

## 2025-06-24 DIAGNOSIS — K40.90 UNILATERAL INGUINAL HERNIA WITHOUT OBSTRUCTION OR GANGRENE, RECURRENCE NOT SPECIFIED: Primary | ICD-10-CM

## 2025-06-24 LAB
ALBUMIN SERPL BCG-MCNC: 4.1 G/DL (ref 3.5–5)
ALP SERPL-CCNC: 35 U/L (ref 34–104)
ALT SERPL W P-5'-P-CCNC: 11 U/L (ref 7–52)
ANION GAP SERPL CALCULATED.3IONS-SCNC: 5 MMOL/L (ref 4–13)
AST SERPL W P-5'-P-CCNC: 13 U/L (ref 13–39)
BASOPHILS # BLD AUTO: 0.02 THOUSANDS/ÂΜL (ref 0–0.1)
BASOPHILS NFR BLD AUTO: 0 % (ref 0–1)
BILIRUB SERPL-MCNC: 1.14 MG/DL (ref 0.2–1)
BUN SERPL-MCNC: 13 MG/DL (ref 5–25)
CALCIUM SERPL-MCNC: 9.3 MG/DL (ref 8.4–10.2)
CHLORIDE SERPL-SCNC: 102 MMOL/L (ref 96–108)
CO2 SERPL-SCNC: 30 MMOL/L (ref 21–32)
CREAT SERPL-MCNC: 0.86 MG/DL (ref 0.6–1.3)
EOSINOPHIL # BLD AUTO: 0.06 THOUSAND/ÂΜL (ref 0–0.61)
EOSINOPHIL NFR BLD AUTO: 1 % (ref 0–6)
ERYTHROCYTE [DISTWIDTH] IN BLOOD BY AUTOMATED COUNT: 13.4 % (ref 11.6–15.1)
GFR SERPL CREATININE-BSD FRML MDRD: 84 ML/MIN/1.73SQ M
GLUCOSE SERPL-MCNC: 89 MG/DL (ref 65–140)
HCT VFR BLD AUTO: 39 % (ref 36.5–49.3)
HGB BLD-MCNC: 13 G/DL (ref 12–17)
IMM GRANULOCYTES # BLD AUTO: 0.02 THOUSAND/UL (ref 0–0.2)
IMM GRANULOCYTES NFR BLD AUTO: 0 % (ref 0–2)
LACTATE SERPL-SCNC: 1 MMOL/L (ref 0.5–2)
LYMPHOCYTES # BLD AUTO: 3.7 THOUSANDS/ÂΜL (ref 0.6–4.47)
LYMPHOCYTES NFR BLD AUTO: 45 % (ref 14–44)
MCH RBC QN AUTO: 31 PG (ref 26.8–34.3)
MCHC RBC AUTO-ENTMCNC: 33.3 G/DL (ref 31.4–37.4)
MCV RBC AUTO: 93 FL (ref 82–98)
MONOCYTES # BLD AUTO: 0.81 THOUSAND/ÂΜL (ref 0.17–1.22)
MONOCYTES NFR BLD AUTO: 10 % (ref 4–12)
NEUTROPHILS # BLD AUTO: 3.55 THOUSANDS/ÂΜL (ref 1.85–7.62)
NEUTS SEG NFR BLD AUTO: 44 % (ref 43–75)
NRBC BLD AUTO-RTO: 0 /100 WBCS
PLATELET # BLD AUTO: 150 THOUSANDS/UL (ref 149–390)
PMV BLD AUTO: 10.1 FL (ref 8.9–12.7)
POTASSIUM SERPL-SCNC: 3.9 MMOL/L (ref 3.5–5.3)
PROT SERPL-MCNC: 6.2 G/DL (ref 6.4–8.4)
RBC # BLD AUTO: 4.19 MILLION/UL (ref 3.88–5.62)
SODIUM SERPL-SCNC: 137 MMOL/L (ref 135–147)
WBC # BLD AUTO: 8.16 THOUSAND/UL (ref 4.31–10.16)

## 2025-06-24 PROCEDURE — 80053 COMPREHEN METABOLIC PANEL: CPT | Performed by: EMERGENCY MEDICINE

## 2025-06-24 PROCEDURE — 99284 EMERGENCY DEPT VISIT MOD MDM: CPT | Performed by: SURGERY

## 2025-06-24 PROCEDURE — 83605 ASSAY OF LACTIC ACID: CPT | Performed by: EMERGENCY MEDICINE

## 2025-06-24 PROCEDURE — 99284 EMERGENCY DEPT VISIT MOD MDM: CPT

## 2025-06-24 PROCEDURE — 99284 EMERGENCY DEPT VISIT MOD MDM: CPT | Performed by: EMERGENCY MEDICINE

## 2025-06-24 PROCEDURE — 85025 COMPLETE CBC W/AUTO DIFF WBC: CPT | Performed by: EMERGENCY MEDICINE

## 2025-06-24 PROCEDURE — 36415 COLL VENOUS BLD VENIPUNCTURE: CPT | Performed by: EMERGENCY MEDICINE

## 2025-06-24 PROCEDURE — 74177 CT ABD & PELVIS W/CONTRAST: CPT

## 2025-06-24 RX ADMIN — IOHEXOL 100 ML: 350 INJECTION, SOLUTION INTRAVENOUS at 15:59

## 2025-06-24 NOTE — DISCHARGE INSTRUCTIONS
Please make sure to follow up with Primary Care Provider for the following abnormal CT scan finding:   Prominent pancreatic duct measuring up to 5 mm. Follow-up with nonurgent pancreatic MRI.     If you develop any new or worsening symptoms please immediately return to your nearest emergency department.

## 2025-06-24 NOTE — ED PROVIDER NOTES
Time reflects when diagnosis was documented in both MDM as applicable and the Disposition within this note       Time User Action Codes Description Comment    6/24/2025  5:16 PM Wes Burrel Add [K40.90] Unilateral inguinal hernia without obstruction or gangrene, recurrence not specified           ED Disposition       ED Disposition   Discharge    Condition   Stable    Date/Time   Tue Jun 24, 2025  5:21 PM    Comment   Regino Mitchell discharge to home/self care.                   Assessment & Plan       Medical Decision Making  74 yo male presenting to the ed for reports of bulge/pain in the R inguinal area. Has been there for a few days. No prior episodes of this reported.  Patient declining pain medications. No other reported symptoms. Unable to reproduce reported swelling that patient had. Will get imaging. Last BM reported yesterday, otherwise normal, no urinary complaints with last urination here in the ed.     General surgery evaluated at bedside and patient Dc'd with strict return precautions and recommend follow up with General Surgery.     Amount and/or Complexity of Data Reviewed  Labs: ordered.  Radiology: ordered.    Risk  Prescription drug management.             Medications   iohexol (OMNIPAQUE) 350 MG/ML injection (MULTI-DOSE) 100 mL (100 mL Intravenous Given 6/24/25 6833)       ED Risk Strat Scores                    No data recorded        SBIRT 20yo+      Flowsheet Row Most Recent Value   Initial Alcohol Screen: US AUDIT-C     1. How often do you have a drink containing alcohol? 0 Filed at: 06/24/2025 1417   2. How many drinks containing alcohol do you have on a typical day you are drinking?  0 Filed at: 06/24/2025 1417   3a. Male UNDER 65: How often do you have five or more drinks on one occasion? 0 Filed at: 06/24/2025 1417   3b. FEMALE Any Age, or MALE 65+: How often do you have 4 or more drinks on one occassion? 0 Filed at: 06/24/2025 1417   Audit-C Score 0 Filed at: 06/24/2025 1417    VERO: How many times in the past year have you...    Used an illegal drug or used a prescription medication for non-medical reasons? Never Filed at: 06/24/2025 1417                            History of Present Illness       Chief Complaint   Patient presents with    Groin Pain     Pt reports groin pain that started 2 days ago after he walked his dogs        Past Medical History[1]   Past Surgical History[2]   Family History[3]   Social History[4]   E-Cigarette/Vaping    E-Cigarette Use Never User       E-Cigarette/Vaping Substances    Nicotine No     THC No     CBD No     Flavoring No     Other No     Unknown No       I have reviewed and agree with the history as documented.     76 yo male with reports of a few days of R inguinal pain. States seemed to start without any associated trauma. No other reported symptoms. States he thinks he noticed a bulge prior. Nothing seems to make better or worse. No urinary/bowel symptoms.       Groin Pain  Associated symptoms: abdominal pain and groin pain        Review of Systems   Gastrointestinal:  Positive for abdominal pain.   All other systems reviewed and are negative.          Objective       ED Triage Vitals [06/24/25 1413]   Temperature Pulse Blood Pressure Respirations SpO2 Patient Position - Orthostatic VS   99 °F (37.2 °C) 94 152/79 20 97 % Sitting      Temp Source Heart Rate Source BP Location FiO2 (%) Pain Score    Temporal Monitor Left arm -- 8      Vitals      Date and Time Temp Pulse SpO2 Resp BP Pain Score FACES Pain Rating User   06/24/25 1737 -- 74 99 % 18 157/88 -- -- AM   06/24/25 1413 99 °F (37.2 °C) 94 97 % 20 152/79 8 -- JZ            Physical Exam  Vitals and nursing note reviewed.   Constitutional:       General: He is not in acute distress.     Appearance: He is well-developed. He is not ill-appearing, toxic-appearing or diaphoretic.   HENT:      Head: Normocephalic and atraumatic.      Right Ear: External ear normal.      Left Ear: External ear  normal.      Nose: Nose normal.     Eyes:      General: No scleral icterus.        Right eye: No discharge.         Left eye: No discharge.      Conjunctiva/sclera: Conjunctivae normal.       Cardiovascular:      Rate and Rhythm: Normal rate and regular rhythm.   Pulmonary:      Effort: Pulmonary effort is normal. No respiratory distress.   Abdominal:      General: Bowel sounds are normal. There is no distension.      Palpations: Abdomen is soft. There is no mass.      Tenderness: There is no abdominal tenderness. There is no right CVA tenderness, left CVA tenderness, guarding or rebound.      Hernia: No hernia is present.   Genitourinary:       Comments: Reported area of pain/swelling  No erythema, induration, fluctuance, ecchymosis, crepitus, deformity noted on overlying skin exam  No swelling/bulge noted on my exam with coughing/bearing down    Musculoskeletal:         General: No deformity or signs of injury. Normal range of motion.      Cervical back: Normal range of motion and neck supple.     Skin:     General: Skin is warm and dry.      Coloration: Skin is not pale.      Findings: No erythema or rash.     Neurological:      General: No focal deficit present.      Mental Status: He is alert and oriented to person, place, and time. Mental status is at baseline.     Psychiatric:         Behavior: Behavior normal.         Thought Content: Thought content normal.         Judgment: Judgment normal.         Results Reviewed       Procedure Component Value Units Date/Time    Lactic acid, plasma (w/reflex if result > 2.0) [[id number]]  (Normal) Collected: 06/24/25 1514    Lab Status: Final result Specimen: Blood from Arm, Left Updated: 06/24/25 1550     LACTIC ACID 1.0 mmol/L     Narrative:      Result may be elevated if tourniquet was used during collection.    Comprehensive metabolic panel [[id number]]  (Abnormal) Collected: 06/24/25 1514    Lab Status: Final result Specimen: Blood from Arm, Left Updated: 06/24/25  1549     Sodium 137 mmol/L      Potassium 3.9 mmol/L      Chloride 102 mmol/L      CO2 30 mmol/L      ANION GAP 5 mmol/L      BUN 13 mg/dL      Creatinine 0.86 mg/dL      Glucose 89 mg/dL      Calcium 9.3 mg/dL      AST 13 U/L      ALT 11 U/L      Alkaline Phosphatase 35 U/L      Total Protein 6.2 g/dL      Albumin 4.1 g/dL      Total Bilirubin 1.14 mg/dL      eGFR 84 ml/min/1.73sq m     Narrative:      National Kidney Disease Foundation guidelines for Chronic Kidney Disease (CKD):     Stage 1 with normal or high GFR (GFR > 90 mL/min/1.73 square meters)    Stage 2 Mild CKD (GFR = 60-89 mL/min/1.73 square meters)    Stage 3A Moderate CKD (GFR = 45-59 mL/min/1.73 square meters)    Stage 3B Moderate CKD (GFR = 30-44 mL/min/1.73 square meters)    Stage 4 Severe CKD (GFR = 15-29 mL/min/1.73 square meters)    Stage 5 End Stage CKD (GFR <15 mL/min/1.73 square meters)  Note: GFR calculation is accurate only with a steady state creatinine    CBC and differential [[id number]]  (Abnormal) Collected: 06/24/25 1514    Lab Status: Final result Specimen: Blood from Arm, Left Updated: 06/24/25 1523     WBC 8.16 Thousand/uL      RBC 4.19 Million/uL      Hemoglobin 13.0 g/dL      Hematocrit 39.0 %      MCV 93 fL      MCH 31.0 pg      MCHC 33.3 g/dL      RDW 13.4 %      MPV 10.1 fL      Platelets 150 Thousands/uL      nRBC 0 /100 WBCs      Segmented % 44 %      Immature Grans % 0 %      Lymphocytes % 45 %      Monocytes % 10 %      Eosinophils Relative 1 %      Basophils Relative 0 %      Absolute Neutrophils 3.55 Thousands/µL      Absolute Immature Grans 0.02 Thousand/uL      Absolute Lymphocytes 3.70 Thousands/µL      Absolute Monocytes 0.81 Thousand/µL      Eosinophils Absolute 0.06 Thousand/µL      Basophils Absolute 0.02 Thousands/µL             CT abdomen pelvis with contrast   Final Interpretation by Joel Suarez MD (06/24 2289)      3.4 cm rim-enhancing cystic structure in the right inguinal hernia. Differential  includes abscess, seroma/hematoma and trapped ascites.      Prominent pancreatic duct measuring up to 5 mm. Follow-up with nonurgent pancreatic MRI.      The study was marked in EPIC for immediate notification.         Workstation performed: [id number]             Procedures    ED Medication and Procedure Management   Prior to Admission Medications   Prescriptions Last Dose Informant Patient Reported? Taking?   atorvastatin (LIPITOR) 10 mg tablet 2025  No Yes   Sig: TAKE 1 TABLET BY MOUTH EVERY DAY   divalproex sodium (DEPAKOTE) 250 mg DR tablet 2025  No Yes   Sig: TAKE 1.5 TABLETS BY MOUTH IN THE MORNING AND 1 TABLET AT NIGHT   escitalopram (LEXAPRO) 10 mg tablet 2025  No Yes   Sig: Take 1 tablet (10 mg total) by mouth daily   fluticasone (FLONASE) 50 mcg/act nasal spray 2025  No Yes   Si spray into each nostril daily   levothyroxine 100 mcg tablet 2025  No Yes   Sig: TAKE 1 TABLET (100 MCG TOTAL) BY MOUTH DAILY IN THE EARLY MORNING   meloxicam (MOBIC) 15 mg tablet 2025  No Yes   Sig: Take 1 tablet (15 mg total) by mouth daily   methocarbamol (ROBAXIN) 500 mg tablet 2025 Self No Yes   Sig: TAKE 1 TABLET BY MOUTH EVERY 8 HOURS AS NEEDED FOR MUSCLE SPASMS.      Facility-Administered Medications: None     Discharge Medication List as of 2025  5:21 PM        CONTINUE these medications which have NOT CHANGED    Details   atorvastatin (LIPITOR) 10 mg tablet TAKE 1 TABLET BY MOUTH EVERY DAY, Starting Thu 2025, Normal      divalproex sodium (DEPAKOTE) 250 mg DR tablet TAKE 1.5 TABLETS BY MOUTH IN THE MORNING AND 1 TABLET AT NIGHT, Normal      escitalopram (LEXAPRO) 10 mg tablet Take 1 tablet (10 mg total) by mouth daily, Starting Wed 3/19/2025, Until Sat 3/14/2026, Normal      fluticasone (FLONASE) 50 mcg/act nasal spray 1 spray into each nostril daily, Starting Wed 3/19/2025, Normal      levothyroxine 100 mcg tablet TAKE 1 TABLET (100 MCG TOTAL) BY MOUTH DAILY IN THE EARLY  MORNING, Starting Thu 4/10/2025, Normal      meloxicam (MOBIC) 15 mg tablet Take 1 tablet (15 mg total) by mouth daily, Starting Thu 4/10/2025, Normal      methocarbamol (ROBAXIN) 500 mg tablet TAKE 1 TABLET BY MOUTH EVERY 8 HOURS AS NEEDED FOR MUSCLE SPASMS., Starting Wed 1/10/2024, Normal             ED SEPSIS DOCUMENTATION   Time reflects when diagnosis was documented in both MDM as applicable and the Disposition within this note       Time User Action Codes Description Comment    2025  5:16 PM Kahlil Burr Add [K40.90] Unilateral inguinal hernia without obstruction or gangrene, recurrence not specified                      [1]   Past Medical History:  Diagnosis Date    High cholesterol     Memory loss     Seizure (HCC)    [2]   Past Surgical History:  Procedure Laterality Date    HERNIA REPAIR      when he was a kid    WRIST SURGERY Right     when he was a kid   [3]   Family History  Problem Relation Name Age of Onset    Cancer Mother      Diabetes Mother      Prostate cancer Father      Cancer Brother      Prostate cancer Brother     [4]   Social History  Tobacco Use    Smoking status: Former     Types: Cigars     Quit date:      Years since quittin.4     Passive exposure: Past    Smokeless tobacco: Never    Tobacco comments:     cigar use - social    Vaping Use    Vaping status: Never Used   Substance Use Topics    Alcohol use: Not Currently     Comment: two beers/ day and occassional wine - quit     Drug use: Never        Kahlil Burr,   25 190

## 2025-06-25 ENCOUNTER — APPOINTMENT (OUTPATIENT)
Dept: PHYSICAL THERAPY | Facility: CLINIC | Age: 75
End: 2025-06-25
Attending: INTERNAL MEDICINE
Payer: MEDICARE

## 2025-06-25 ENCOUNTER — TELEPHONE (OUTPATIENT)
Dept: SURGERY | Facility: CLINIC | Age: 75
End: 2025-06-25

## 2025-06-25 PROBLEM — K40.90 RIGHT INGUINAL HERNIA: Status: ACTIVE | Noted: 2025-06-25

## 2025-06-25 NOTE — TELEPHONE ENCOUNTER
Called and left a message for the patient to call the office to schedule an appt with Umair next week for pre op ing hernia

## 2025-06-25 NOTE — CONSULTS
Consultation - Surgery-General   Name: Regino Mitchell 75 y.o. male I MRN: [id number]  Unit/Bed#: ED 26 I Date of Admission: 6/24/2025   Date of Service: 6/25/2025 I Hospital Day: 0   Consults  Physician Requesting Evaluation: No att. providers found   Reason for Evaluation / Principal Problem: New onset right groin tenderness    Assessment & Plan  Right inguinal hernia  The patient is a pleasant 75-year-old male presenting to the ER with new onset of right groin tenderness for evaluation.    Here in the emergency room his evaluation included history and physical examination as well as CT imaging which revealed the presence of a fluid-filled right groin cyst.    Consultation obtained with general surgery who after examination of the patient and review of the studies confirmed the diagnosis of a reducible right inguinal hernia.    The options benefits risks and alternatives to laparoscopic right inguinal herniorrhaphy with mesh were reviewed with the patient.    Additional conversation was had with the patient as to whether or not he wished to undergo the operation acutely during this hospitalization versus being released to home for an elective repair on an expedited basis.    Together we decided to allow the patient to be released to home with plans for short-term follow-up to arrange for the outpatient laparoscopic right inguinal herniorrhaphy.    All questions answered to the satisfaction of the patient.    He understands that should he develop acute pain in the right groin in the interval between his release from the ER and the time of his scheduled surgery that he must return immediately to the hospital for reevaluation.    Patient's daughter was present for the entirety of the discussion.  Seizure (HCC)    Acquired hypothyroidism    Medications reviewed. Continue current medications at prescribed doses.    History of Present Illness   Regino Mitchell is a 75 y.o. male who presents with a newly diagnosed right  inguinal hernia for which consultation was obtained with general surgery.    Review of Systems   Constitutional:  Negative for chills and fever.   HENT:  Negative for ear pain and sore throat.    Eyes:  Negative for pain and visual disturbance.   Respiratory:  Negative for cough and shortness of breath.    Cardiovascular:  Negative for chest pain and palpitations.   Gastrointestinal:  Negative for abdominal pain and vomiting.   Genitourinary:  Negative for dysuria and hematuria.   Musculoskeletal:  Negative for arthralgias and back pain.   Skin:  Negative for color change and rash.   Neurological:  Negative for seizures and syncope.   All other systems reviewed and are negative.    Medical History Review: I have reviewed the patient's PMH, PSH, Social History, Family History, Meds, and Allergies     Objective :  Temp:  [99 °F (37.2 °C)] 99 °F (37.2 °C)  HR:  [74-94] 74  BP: (152-157)/(79-88) 157/88  Resp:  [18-20] 18  SpO2:  [97 %-99 %] 99 %  O2 Device: None (Room air)      Physical Exam  Vitals and nursing note reviewed.   Constitutional:       General: He is not in acute distress.     Appearance: He is well-developed.   HENT:      Head: Normocephalic and atraumatic.     Eyes:      Conjunctiva/sclera: Conjunctivae normal.       Cardiovascular:      Rate and Rhythm: Normal rate and regular rhythm.      Heart sounds: No murmur heard.  Pulmonary:      Effort: Pulmonary effort is normal. No respiratory distress.      Breath sounds: Normal breath sounds.   Abdominal:      Palpations: Abdomen is soft.      Tenderness: There is no abdominal tenderness.      Comments: Reducible right inguinal hernia present     Musculoskeletal:         General: No swelling.      Cervical back: Neck supple.     Skin:     General: Skin is warm and dry.      Capillary Refill: Capillary refill takes less than 2 seconds.     Neurological:      Mental Status: He is alert.     Psychiatric:         Mood and Affect: Mood normal.         Lab  Results: I have reviewed the following results:  Recent Labs     06/24/25  1514   WBC 8.16   HGB 13.0   HCT 39.0      SODIUM 137   K 3.9      CO2 30   BUN 13   CREATININE 0.86   GLUC 89   AST 13   ALT 11   ALB 4.1   TBILI 1.14*   ALKPHOS 35   LACTICACID 1.0       Imaging Results Review: I personally reviewed the following image studies/reports in PACS and discussed pertinent findings with Radiology: CT abdomen/pelvis. My interpretation of the radiology images/reports is: Right groin fluid collection with no signs of acute incarceration or strangulation.  Other Study Results Review: No additional pertinent studies reviewed.    VTE Pharmacologic Prophylaxis: Heparin  VTE Mechanical Prophylaxis: sequential compression device    Administrative Statements   I have spent a total time of 20 minutes in caring for this patient on the day of the visit/encounter including Diagnostic results, Prognosis, Risks and benefits of tx options, Instructions for management, Patient and family education, and Impressions.

## 2025-06-25 NOTE — ASSESSMENT & PLAN NOTE
The patient is a pleasant 75-year-old male presenting to the ER with new onset of right groin tenderness for evaluation.    Here in the emergency room his evaluation included history and physical examination as well as CT imaging which revealed the presence of a fluid-filled right groin cyst.    Consultation obtained with general surgery who after examination of the patient and review of the studies confirmed the diagnosis of a reducible right inguinal hernia.    The options benefits risks and alternatives to laparoscopic right inguinal herniorrhaphy with mesh were reviewed with the patient.    Additional conversation was had with the patient as to whether or not he wished to undergo the operation acutely during this hospitalization versus being released to home for an elective repair on an expedited basis.    Together we decided to allow the patient to be released to home with plans for short-term follow-up to arrange for the outpatient laparoscopic right inguinal herniorrhaphy.    All questions answered to the satisfaction of the patient.    He understands that should he develop acute pain in the right groin in the interval between his release from the ER and the time of his scheduled surgery that he must return immediately to the hospital for reevaluation.    Patient's daughter was present for the entirety of the discussion.

## 2025-06-30 ENCOUNTER — CONSULT (OUTPATIENT)
Dept: SURGERY | Facility: CLINIC | Age: 75
End: 2025-06-30
Payer: MEDICARE

## 2025-06-30 VITALS
SYSTOLIC BLOOD PRESSURE: 140 MMHG | WEIGHT: 148 LBS | BODY MASS INDEX: 24.66 KG/M2 | TEMPERATURE: 98.2 F | DIASTOLIC BLOOD PRESSURE: 74 MMHG | HEIGHT: 65 IN | HEART RATE: 86 BPM | OXYGEN SATURATION: 96 %

## 2025-06-30 DIAGNOSIS — K40.90 UNILATERAL INGUINAL HERNIA WITHOUT OBSTRUCTION OR GANGRENE, RECURRENCE NOT SPECIFIED: ICD-10-CM

## 2025-06-30 DIAGNOSIS — R17 ELEVATED BILIRUBIN: ICD-10-CM

## 2025-06-30 DIAGNOSIS — K40.90 RIGHT INGUINAL HERNIA: ICD-10-CM

## 2025-06-30 DIAGNOSIS — K86.89 DILATED PANCREATIC DUCT: Primary | ICD-10-CM

## 2025-06-30 PROCEDURE — 99204 OFFICE O/P NEW MOD 45 MIN: CPT | Performed by: PHYSICIAN ASSISTANT

## 2025-06-30 RX ORDER — CEFAZOLIN SODIUM 2 G/50ML
2000 SOLUTION INTRAVENOUS ONCE
Status: CANCELLED | OUTPATIENT
Start: 2025-07-10 | End: 2025-06-30

## 2025-06-30 RX ORDER — CHLORHEXIDINE GLUCONATE 40 MG/ML
SOLUTION TOPICAL DAILY PRN
Status: CANCELLED | OUTPATIENT
Start: 2025-07-10

## 2025-06-30 RX ORDER — SODIUM CHLORIDE, SODIUM LACTATE, POTASSIUM CHLORIDE, CALCIUM CHLORIDE 600; 310; 30; 20 MG/100ML; MG/100ML; MG/100ML; MG/100ML
125 INJECTION, SOLUTION INTRAVENOUS CONTINUOUS
Status: CANCELLED | OUTPATIENT
Start: 2025-07-10

## 2025-06-30 NOTE — PROGRESS NOTES
"Name: Regino Mitchell      : 1950      MRN: [id number]  Encounter Provider: Hugh Thakkar PA-C  Encounter Date: 2025   Encounter department: [facility] GENERAL SURG[facility] [place]  :  Assessment & Plan        History of Present Illness {?Quick Links Encounters * My Last Note * Last Note in Specialty * Snapshot * Since Last Visit * History :81472}  HPI  Regino Mitchell is a 75 y.o. male who presents with a right ing hernia, was seen in the ER on 25. Pt states there was a bulge but the ER doctor pushed it back in and it hasn't popped back out but he still suffering from stabbing pains. Pt denies any discoloration to the skin of the groin and denies any issues on the left groin. Pt reports diarrhea depending on what he eats but denies any constipation, issues with urination, abd pain, trouble eating/drinking/swallowing or fevers/chills. Pt denies any prior hernia surgeries, denies any blood thinners, and denies any allergies. CIPRIANO Man  {History obtained from(Optional):11152}    Review of Systems  {Select to insert medical history sections (Optional):58481}     Objective {?Quick Links Trend Vitals * Enter New Vitals * Results Review * Timeline (Adult) * Labs * Imaging * Cardiology * Procedures * Lung Cancer Screening * Surgical eConsent :27361}  /74 (BP Location: Left arm, Patient Position: Sitting, Cuff Size: Standard)   Pulse 86   Temp 98.2 °F (36.8 °C) (Temporal)   Ht 5' 5\" (1.651 m)   Wt 67.1 kg (148 lb)   SpO2 96%   BMI 24.63 kg/m²      Physical Exam    {Administrative / Billing Section (Optional):05239}  "

## 2025-07-01 PROBLEM — K86.89 DILATED PANCREATIC DUCT: Status: ACTIVE | Noted: 2025-07-01

## 2025-07-01 NOTE — ASSESSMENT & PLAN NOTE
Pleasant 75-year-old male seen recent in the ER for acutely incarcerated right inguinal hernia that was able to be reduced by Dr. Arora.  He returns to the office today reporting no recurrence of the hernia but ongoing occasional tenderness in the groin.  On exam no bulging hernia appreciated currently.  Based on his recent history recommendation has been made for definitive treatment of his condition with a laparoscopic right inguinal hernia repair with mesh.  Details of the procedure and associated risks related to anesthesia, bleeding, infection, bowel injury, mesh use, chronic pain, recurrence all reviewed and informed consent obtained to proceed.

## 2025-07-01 NOTE — ASSESSMENT & PLAN NOTE
CT from the ER reviewed with notable finding of dilated pancreatic duct.  Chart reviewed showing mildly elevated bilirubin over the last several months.  These findings reviewed with the patient and his daughter in the office today.  Recommendation made to complete MRI abdomen with and without contrast and MRCP to exclude obstructive pathology including choledocholithiasis as well as pancreatic neoplasms.  Questions answered, agreeable to the plan.  Also reviewed symptoms of jaundice that should warrant presentation to the ER for further evaluation.

## 2025-07-07 ENCOUNTER — OFFICE VISIT (OUTPATIENT)
Dept: LAB | Facility: HOSPITAL | Age: 75
End: 2025-07-07
Payer: MEDICARE

## 2025-07-07 DIAGNOSIS — K40.90 UNILATERAL INGUINAL HERNIA WITHOUT OBSTRUCTION OR GANGRENE, RECURRENCE NOT SPECIFIED: ICD-10-CM

## 2025-07-07 LAB
ATRIAL RATE: 63 BPM
P AXIS: 41 DEGREES
PR INTERVAL: 144 MS
QRS AXIS: 13 DEGREES
QRSD INTERVAL: 66 MS
QT INTERVAL: 388 MS
QTC INTERVAL: 398 MS
T WAVE AXIS: 25 DEGREES
VENTRICULAR RATE: 63 BPM

## 2025-07-07 PROCEDURE — 93010 ELECTROCARDIOGRAM REPORT: CPT | Performed by: INTERNAL MEDICINE

## 2025-07-07 PROCEDURE — 93005 ELECTROCARDIOGRAM TRACING: CPT

## 2025-07-07 NOTE — PRE-PROCEDURE INSTRUCTIONS
Pre-Surgery Instructions:   Medication Instructions    atorvastatin (LIPITOR) 10 mg tablet Take day of surgery.    divalproex sodium (DEPAKOTE) 250 mg DR tablet Take day of surgery.    escitalopram (LEXAPRO) 10 mg tablet Take day of surgery.    levothyroxine 100 mcg tablet Take day of surgery.    meloxicam (MOBIC) 15 mg tablet Stop taking 3 days prior to surgery.    methocarbamol (ROBAXIN) 500 mg tablet Uses PRN- OK to take day of surgery   Medication instructions for day of surgery reviewed. Patient verbalized understanding and agrees with the plan.  Please take all instructed medications with only a sip of water. Please do not take any over the counter (non-prescribed) vitamins or supplements for one week prior to date of surgery.      You will receive a call one business day prior to surgery with an arrival time and hospital directions. If your surgery is scheduled on a Monday, the hospital will be calling you on the Friday prior to your surgery. If you have not heard from anyone by 8pm, please call the hospital supervisor through the hospital  at [phone]. ([place] [phone] or [place] [phone]).    Do not eat or drink anything after midnight the night before your surgery, including candy, mints, lifesavers, or chewing gum. Do not drink alcohol 24hrs before your surgery. Try not to smoke at least 24hrs before your surgery.       Follow the pre surgery showering instructions as listed in the “My Surgical Experience Booklet” or otherwise provided by your surgeon's office. Do not use a blade to shave the surgical area 1 week before surgery. It is okay to use a clean electric clippers up to 24 hours before surgery. Do not apply any lotions, creams, including makeup, cologne, deodorant, or perfumes after showering on the day of your surgery. Do not use dry shampoo, hair spray, hair gel, or any type of hair products.     No contact lenses, eye make-up, or artificial eyelashes. Remove nail polish,  including gel polish, and any artificial, gel, or acrylic nails if possible. Remove all jewelry including rings and body piercing jewelry.     Wear causal clothing that is easy to take on and off. Consider your type of surgery.    Keep any valuables, jewelry, piercings at home. Please bring any specially ordered equipment (sling, braces) if indicated.    Arrange for a responsible person to drive you to and from the hospital on the day of your surgery. Please confirm the visitor policy for the day of your procedure when you receive your phone call with an arrival time.     Call the surgeon's office with any new illnesses, exposures, or additional questions prior to surgery.    Please reference your “My Surgical Experience Booklet” for additional information to prepare for your upcoming surgery.

## 2025-07-09 NOTE — H&P
H&P Exam - General Surgery   Regino Mitchell 75 y.o. male MRN: [id number]   Encounter: [id number]    Assessment & Plan    Right inguinal hernia  Pleasant 75-year-old male seen recent in the ER for acutely incarcerated right inguinal hernia that was able to be reduced by Dr. Arora.  He returns to the office today reporting no recurrence of the hernia but ongoing occasional tenderness in the groin.  On exam no bulging hernia appreciated currently.  Based on his recent history recommendation has been made for definitive treatment of his condition with a laparoscopic right inguinal hernia repair with mesh.  Details of the procedure and associated risks related to anesthesia, bleeding, infection, bowel injury, mesh use, chronic pain, recurrence all reviewed and informed consent obtained to proceed.    Dilated pancreatic duct  CT from the ER reviewed with notable finding of dilated pancreatic duct.  Chart reviewed showing mildly elevated bilirubin over the last several months.  These findings reviewed with the patient and his daughter in the office today.  Recommendation made to complete MRI abdomen with and without contrast and MRCP to exclude obstructive pathology including choledocholithiasis as well as pancreatic neoplasms.  Questions answered, agreeable to the plan.  Also reviewed symptoms of jaundice that should warrant presentation to the ER for further evaluation.  Diagnoses and all orders for this visit:    Dilated pancreatic duct  -     MRI abdomen w wo contrast and mrcp; Future    Unilateral inguinal hernia without obstruction or gangrene, recurrence not specified  -     Ambulatory Referral to General Surgery  -     EKG 12 lead; Future  -     Case request operating room: REPAIR HERNIA INGUINAL, LAPAROSCOPIC; Standing  -     Case request operating room: REPAIR HERNIA INGUINAL, LAPAROSCOPIC    Elevated bilirubin  -     MRI abdomen w wo contrast and mrcp; Future    Right inguinal hernia    Other orders  -      Diet NPO; Sips with meds; Standing  -     Apply Sequential Compression Device; Standing  -     Place sequential compression device; Standing  -     chlorhexidine gluconate (HIBICLENS) 4 % topical liquid  -     Vital signs; Standing  -     Height and weight upon arrival; Standing  -     Insert and maintain IV line; Standing  -     Void; Standing  -     lactated ringers infusion  -     UA (URINE) with reflex to Scope; Standing  -     ceFAZolin (ANCEF) 2,000 mg in sodium chloride 0.9 % 50 mL IVPB        History of Present Illness   Chief Complaint   Patient presents with    Consult     pre op ing hernia     75 M who presents with a right ing hernia, was seen in the ER on 06/24/25. Pt states there was a bulge but the ER doctor pushed it back in and it hasn't popped back out but he still suffering from stabbing pains. Pt denies any discoloration to the skin of the groin and denies any issues on the left groin. Pt reports diarrhea depending on what he eats but denies any constipation, issues with urination, abd pain, trouble eating/drinking/swallowing or fevers/chills. Pt denies any prior hernia surgeries, denies any blood thinners, and denies any allergies. CIPRIANO Man        Review of Systems   All other systems reviewed and are negative.      Historical Information   Past Medical History[1]  Past Surgical History[2]  Social History   Social History     Substance and Sexual Activity   Alcohol Use Not Currently    Comment: two beers/ day and occassional wine - quit 2025     Social History     Substance and Sexual Activity   Drug Use Never     Tobacco Use History[3]  Family History[4]    Meds/Allergies   Current Medications[5]  Allergies[6]    The following portions of the patient's history were reviewed and updated as appropriate: allergies, current medications, past family history, past medical history, past social history, past surgical history, and problem list.    Objective   Current Vitals:   Blood pressure 140/74,  "pulse 86, temperature 98.2 °F (36.8 °C), temperature source Temporal, height 5' 5\" (1.651 m), weight 67.1 kg (148 lb), SpO2 96%.    Physical Exam  Constitutional:       General: He is not in acute distress.     Appearance: He is well-developed. He is not diaphoretic.   HENT:      Head: Normocephalic and atraumatic.     Eyes:      Pupils: Pupils are equal, round, and reactive to light.     Pulmonary:      Effort: No respiratory distress.   Abdominal:      Comments: Soft abdomen with mild right groin tenderness to palpation.  No bulging hernia present currently, did not ask to perform Valsalva to recent incarceration.     Musculoskeletal:         General: Normal range of motion.      Cervical back: Normal range of motion.     Skin:     General: Skin is warm and dry.     Neurological:      Mental Status: He is alert and oriented to person, place, and time.         Signature:  Hugh Thakkar PA-C  Date: 2025 Time: 3:26 PM          [1]   Past Medical History:  Diagnosis Date    Depression     Disease of thyroid gland     Fall     2025    High cholesterol     Memory loss     Seizure (HCC)    [2]   Past Surgical History:  Procedure Laterality Date    DENTAL SURGERY      HERNIA REPAIR      when he was a kid    WRIST SURGERY Right     when he was a kid   [3]   Social History  Tobacco Use   Smoking Status Former    Types: Cigars    Quit date:     Years since quittin.5    Passive exposure: Past   Smokeless Tobacco Never   Tobacco Comments    cigar use - social    [4]   Family History  Problem Relation Name Age of Onset    Cancer Mother      Diabetes Mother      Prostate cancer Father      Cancer Brother      Prostate cancer Brother     [5]   Current Outpatient Medications:     atorvastatin (LIPITOR) 10 mg tablet, TAKE 1 TABLET BY MOUTH EVERY DAY, Disp: 90 tablet, Rfl: 1    divalproex sodium (DEPAKOTE) 250 mg DR tablet, TAKE 1.5 TABLETS BY MOUTH IN THE MORNING AND 1 TABLET AT NIGHT, Disp: 270 tablet, " Rfl: 1    escitalopram (LEXAPRO) 10 mg tablet, Take 1 tablet (10 mg total) by mouth daily, Disp: 90 tablet, Rfl: 3    fluticasone (FLONASE) 50 mcg/act nasal spray, 1 spray into each nostril daily (Patient not taking: Reported on 7/7/2025), Disp: 16 g, Rfl: 0    levothyroxine 100 mcg tablet, TAKE 1 TABLET (100 MCG TOTAL) BY MOUTH DAILY IN THE EARLY MORNING, Disp: 90 tablet, Rfl: 1    meloxicam (MOBIC) 15 mg tablet, Take 1 tablet (15 mg total) by mouth daily, Disp: 30 tablet, Rfl: 2    methocarbamol (ROBAXIN) 500 mg tablet, TAKE 1 TABLET BY MOUTH EVERY 8 HOURS AS NEEDED FOR MUSCLE SPASMS., Disp: 90 tablet, Rfl: 0  [6] No Known Allergies

## 2025-07-09 NOTE — H&P (VIEW-ONLY)
H&P Exam - General Surgery   Regino Mitchell 75 y.o. male MRN: [id number]   Encounter: [id number]    Assessment & Plan    Right inguinal hernia  Pleasant 75-year-old male seen recent in the ER for acutely incarcerated right inguinal hernia that was able to be reduced by Dr. Arora.  He returns to the office today reporting no recurrence of the hernia but ongoing occasional tenderness in the groin.  On exam no bulging hernia appreciated currently.  Based on his recent history recommendation has been made for definitive treatment of his condition with a laparoscopic right inguinal hernia repair with mesh.  Details of the procedure and associated risks related to anesthesia, bleeding, infection, bowel injury, mesh use, chronic pain, recurrence all reviewed and informed consent obtained to proceed.    Dilated pancreatic duct  CT from the ER reviewed with notable finding of dilated pancreatic duct.  Chart reviewed showing mildly elevated bilirubin over the last several months.  These findings reviewed with the patient and his daughter in the office today.  Recommendation made to complete MRI abdomen with and without contrast and MRCP to exclude obstructive pathology including choledocholithiasis as well as pancreatic neoplasms.  Questions answered, agreeable to the plan.  Also reviewed symptoms of jaundice that should warrant presentation to the ER for further evaluation.  Diagnoses and all orders for this visit:    Dilated pancreatic duct  -     MRI abdomen w wo contrast and mrcp; Future    Unilateral inguinal hernia without obstruction or gangrene, recurrence not specified  -     Ambulatory Referral to General Surgery  -     EKG 12 lead; Future  -     Case request operating room: REPAIR HERNIA INGUINAL, LAPAROSCOPIC; Standing  -     Case request operating room: REPAIR HERNIA INGUINAL, LAPAROSCOPIC    Elevated bilirubin  -     MRI abdomen w wo contrast and mrcp; Future    Right inguinal hernia    Other orders  -      Diet NPO; Sips with meds; Standing  -     Apply Sequential Compression Device; Standing  -     Place sequential compression device; Standing  -     chlorhexidine gluconate (HIBICLENS) 4 % topical liquid  -     Vital signs; Standing  -     Height and weight upon arrival; Standing  -     Insert and maintain IV line; Standing  -     Void; Standing  -     lactated ringers infusion  -     UA (URINE) with reflex to Scope; Standing  -     ceFAZolin (ANCEF) 2,000 mg in sodium chloride 0.9 % 50 mL IVPB        History of Present Illness   Chief Complaint   Patient presents with    Consult     pre op ing hernia     75 M who presents with a right ing hernia, was seen in the ER on 06/24/25. Pt states there was a bulge but the ER doctor pushed it back in and it hasn't popped back out but he still suffering from stabbing pains. Pt denies any discoloration to the skin of the groin and denies any issues on the left groin. Pt reports diarrhea depending on what he eats but denies any constipation, issues with urination, abd pain, trouble eating/drinking/swallowing or fevers/chills. Pt denies any prior hernia surgeries, denies any blood thinners, and denies any allergies. CIPRIANO Man        Review of Systems   All other systems reviewed and are negative.      Historical Information   Past Medical History[1]  Past Surgical History[2]  Social History   Social History     Substance and Sexual Activity   Alcohol Use Not Currently    Comment: two beers/ day and occassional wine - quit 2025     Social History     Substance and Sexual Activity   Drug Use Never     Tobacco Use History[3]  Family History[4]    Meds/Allergies   Current Medications[5]  Allergies[6]    The following portions of the patient's history were reviewed and updated as appropriate: allergies, current medications, past family history, past medical history, past social history, past surgical history, and problem list.    Objective   Current Vitals:   Blood pressure 140/74,  "pulse 86, temperature 98.2 °F (36.8 °C), temperature source Temporal, height 5' 5\" (1.651 m), weight 67.1 kg (148 lb), SpO2 96%.    Physical Exam  Constitutional:       General: He is not in acute distress.     Appearance: He is well-developed. He is not diaphoretic.   HENT:      Head: Normocephalic and atraumatic.     Eyes:      Pupils: Pupils are equal, round, and reactive to light.     Pulmonary:      Effort: No respiratory distress.   Abdominal:      Comments: Soft abdomen with mild right groin tenderness to palpation.  No bulging hernia present currently, did not ask to perform Valsalva to recent incarceration.     Musculoskeletal:         General: Normal range of motion.      Cervical back: Normal range of motion.     Skin:     General: Skin is warm and dry.     Neurological:      Mental Status: He is alert and oriented to person, place, and time.         Signature:  Hugh Thakkar PA-C  Date: 2025 Time: 3:26 PM          [1]   Past Medical History:  Diagnosis Date    Depression     Disease of thyroid gland     Fall     2025    High cholesterol     Memory loss     Seizure (HCC)    [2]   Past Surgical History:  Procedure Laterality Date    DENTAL SURGERY      HERNIA REPAIR      when he was a kid    WRIST SURGERY Right     when he was a kid   [3]   Social History  Tobacco Use   Smoking Status Former    Types: Cigars    Quit date:     Years since quittin.5    Passive exposure: Past   Smokeless Tobacco Never   Tobacco Comments    cigar use - social    [4]   Family History  Problem Relation Name Age of Onset    Cancer Mother      Diabetes Mother      Prostate cancer Father      Cancer Brother      Prostate cancer Brother     [5]   Current Outpatient Medications:     atorvastatin (LIPITOR) 10 mg tablet, TAKE 1 TABLET BY MOUTH EVERY DAY, Disp: 90 tablet, Rfl: 1    divalproex sodium (DEPAKOTE) 250 mg DR tablet, TAKE 1.5 TABLETS BY MOUTH IN THE MORNING AND 1 TABLET AT NIGHT, Disp: 270 tablet, " Rfl: 1    escitalopram (LEXAPRO) 10 mg tablet, Take 1 tablet (10 mg total) by mouth daily, Disp: 90 tablet, Rfl: 3    fluticasone (FLONASE) 50 mcg/act nasal spray, 1 spray into each nostril daily (Patient not taking: Reported on 7/7/2025), Disp: 16 g, Rfl: 0    levothyroxine 100 mcg tablet, TAKE 1 TABLET (100 MCG TOTAL) BY MOUTH DAILY IN THE EARLY MORNING, Disp: 90 tablet, Rfl: 1    meloxicam (MOBIC) 15 mg tablet, Take 1 tablet (15 mg total) by mouth daily, Disp: 30 tablet, Rfl: 2    methocarbamol (ROBAXIN) 500 mg tablet, TAKE 1 TABLET BY MOUTH EVERY 8 HOURS AS NEEDED FOR MUSCLE SPASMS., Disp: 90 tablet, Rfl: 0  [6] No Known Allergies

## 2025-07-10 ENCOUNTER — ANESTHESIA EVENT (OUTPATIENT)
Dept: PERIOP | Facility: HOSPITAL | Age: 75
End: 2025-07-10
Payer: MEDICARE

## 2025-07-10 ENCOUNTER — HOSPITAL ENCOUNTER (OUTPATIENT)
Facility: HOSPITAL | Age: 75
Setting detail: OUTPATIENT SURGERY
Discharge: HOME | End: 2025-07-10
Attending: SURGERY | Admitting: SURGERY
Payer: MEDICARE

## 2025-07-10 ENCOUNTER — ANESTHESIA (OUTPATIENT)
Dept: PERIOP | Facility: HOSPITAL | Age: 75
End: 2025-07-10
Payer: MEDICARE

## 2025-07-10 VITALS
HEART RATE: 96 BPM | DIASTOLIC BLOOD PRESSURE: 88 MMHG | BODY MASS INDEX: 24.66 KG/M2 | WEIGHT: 148 LBS | OXYGEN SATURATION: 94 % | SYSTOLIC BLOOD PRESSURE: 135 MMHG | HEIGHT: 65 IN | TEMPERATURE: 97.5 F | RESPIRATION RATE: 22 BRPM

## 2025-07-10 DIAGNOSIS — A69.20 ERYTHEMA MIGRANS (LYME DISEASE): Primary | ICD-10-CM

## 2025-07-10 DIAGNOSIS — K40.90 RIGHT INGUINAL HERNIA: ICD-10-CM

## 2025-07-10 PROCEDURE — C1781 MESH (IMPLANTABLE): HCPCS | Performed by: SURGERY

## 2025-07-10 PROCEDURE — 49650 LAP ING HERNIA REPAIR INIT: CPT | Performed by: SURGERY

## 2025-07-10 PROCEDURE — 49650 LAP ING HERNIA REPAIR INIT: CPT | Performed by: PHYSICIAN ASSISTANT

## 2025-07-10 DEVICE — ETHICON SECURESTRAP ABSORBABLE FIXATION DEVICE
Type: IMPLANTABLE DEVICE | Site: INGUINAL | Status: FUNCTIONAL
Brand: ETHICON SECURESTRAP

## 2025-07-10 DEVICE — 3DMAX MESH, 10.8 CM X 16.0 CM (4.3" X 6.3"), RIGHT, LARGE
Type: IMPLANTABLE DEVICE | Site: INGUINAL | Status: FUNCTIONAL
Brand: 3DMAX

## 2025-07-10 RX ORDER — SODIUM CHLORIDE, SODIUM LACTATE, POTASSIUM CHLORIDE, CALCIUM CHLORIDE 600; 310; 30; 20 MG/100ML; MG/100ML; MG/100ML; MG/100ML
125 INJECTION, SOLUTION INTRAVENOUS CONTINUOUS
Status: DISCONTINUED | OUTPATIENT
Start: 2025-07-10 | End: 2025-07-10 | Stop reason: HOSPADM

## 2025-07-10 RX ORDER — HYDROMORPHONE HCL/PF 1 MG/ML
0.25 SYRINGE (ML) INJECTION
Refills: 0 | Status: DISCONTINUED | OUTPATIENT
Start: 2025-07-10 | End: 2025-07-10 | Stop reason: HOSPADM

## 2025-07-10 RX ORDER — PROPOFOL 10 MG/ML
INJECTION, EMULSION INTRAVENOUS AS NEEDED
Status: DISCONTINUED | OUTPATIENT
Start: 2025-07-10 | End: 2025-07-10

## 2025-07-10 RX ORDER — MAGNESIUM HYDROXIDE 1200 MG/15ML
LIQUID ORAL AS NEEDED
Status: DISCONTINUED | OUTPATIENT
Start: 2025-07-10 | End: 2025-07-10 | Stop reason: HOSPADM

## 2025-07-10 RX ORDER — FENTANYL CITRATE 50 UG/ML
INJECTION, SOLUTION INTRAMUSCULAR; INTRAVENOUS AS NEEDED
Status: DISCONTINUED | OUTPATIENT
Start: 2025-07-10 | End: 2025-07-10

## 2025-07-10 RX ORDER — DEXAMETHASONE SODIUM PHOSPHATE 10 MG/ML
INJECTION, SOLUTION INTRAMUSCULAR; INTRAVENOUS AS NEEDED
Status: DISCONTINUED | OUTPATIENT
Start: 2025-07-10 | End: 2025-07-10

## 2025-07-10 RX ORDER — ROCURONIUM BROMIDE 10 MG/ML
INJECTION, SOLUTION INTRAVENOUS AS NEEDED
Status: DISCONTINUED | OUTPATIENT
Start: 2025-07-10 | End: 2025-07-10

## 2025-07-10 RX ORDER — BUPIVACAINE HYDROCHLORIDE 5 MG/ML
INJECTION, SOLUTION EPIDURAL; INTRACAUDAL; PERINEURAL AS NEEDED
Status: DISCONTINUED | OUTPATIENT
Start: 2025-07-10 | End: 2025-07-10 | Stop reason: HOSPADM

## 2025-07-10 RX ORDER — ONDANSETRON 2 MG/ML
INJECTION INTRAMUSCULAR; INTRAVENOUS AS NEEDED
Status: DISCONTINUED | OUTPATIENT
Start: 2025-07-10 | End: 2025-07-10

## 2025-07-10 RX ORDER — CHLORHEXIDINE GLUCONATE 40 MG/ML
SOLUTION TOPICAL DAILY PRN
Status: DISCONTINUED | OUTPATIENT
Start: 2025-07-10 | End: 2025-07-10 | Stop reason: HOSPADM

## 2025-07-10 RX ORDER — LIDOCAINE HYDROCHLORIDE 10 MG/ML
INJECTION, SOLUTION EPIDURAL; INFILTRATION; INTRACAUDAL; PERINEURAL AS NEEDED
Status: DISCONTINUED | OUTPATIENT
Start: 2025-07-10 | End: 2025-07-10

## 2025-07-10 RX ORDER — HYDROMORPHONE HYDROCHLORIDE 1 MG/ML
INJECTION, SOLUTION INTRAMUSCULAR; INTRAVENOUS; SUBCUTANEOUS AS NEEDED
Status: DISCONTINUED | OUTPATIENT
Start: 2025-07-10 | End: 2025-07-10

## 2025-07-10 RX ORDER — OXYCODONE AND ACETAMINOPHEN 5; 325 MG/1; MG/1
1 TABLET ORAL EVERY 6 HOURS PRN
Qty: 15 TABLET | Refills: 0 | Status: SHIPPED | OUTPATIENT
Start: 2025-07-10 | End: 2025-07-20

## 2025-07-10 RX ORDER — EPHEDRINE SULFATE 50 MG/ML
INJECTION INTRAVENOUS AS NEEDED
Status: DISCONTINUED | OUTPATIENT
Start: 2025-07-10 | End: 2025-07-10

## 2025-07-10 RX ORDER — HYDROMORPHONE HCL IN WATER/PF 6 MG/30 ML
0.2 PATIENT CONTROLLED ANALGESIA SYRINGE INTRAVENOUS
Status: DISCONTINUED | OUTPATIENT
Start: 2025-07-10 | End: 2025-07-10 | Stop reason: HOSPADM

## 2025-07-10 RX ORDER — ONDANSETRON 2 MG/ML
4 INJECTION INTRAMUSCULAR; INTRAVENOUS EVERY 6 HOURS PRN
Status: DISCONTINUED | OUTPATIENT
Start: 2025-07-10 | End: 2025-07-10 | Stop reason: HOSPADM

## 2025-07-10 RX ORDER — OXYCODONE HYDROCHLORIDE 5 MG/1
5 TABLET ORAL EVERY 6 HOURS PRN
Refills: 0 | Status: DISCONTINUED | OUTPATIENT
Start: 2025-07-10 | End: 2025-07-10 | Stop reason: HOSPADM

## 2025-07-10 RX ORDER — CEFAZOLIN SODIUM 2 G/50ML
2000 SOLUTION INTRAVENOUS ONCE
Status: COMPLETED | OUTPATIENT
Start: 2025-07-10 | End: 2025-07-10

## 2025-07-10 RX ORDER — ONDANSETRON 2 MG/ML
4 INJECTION INTRAMUSCULAR; INTRAVENOUS ONCE AS NEEDED
Status: DISCONTINUED | OUTPATIENT
Start: 2025-07-10 | End: 2025-07-10 | Stop reason: HOSPADM

## 2025-07-10 RX ORDER — ACETAMINOPHEN 325 MG/1
650 TABLET ORAL EVERY 6 HOURS PRN
Status: DISCONTINUED | OUTPATIENT
Start: 2025-07-10 | End: 2025-07-10 | Stop reason: HOSPADM

## 2025-07-10 RX ORDER — FENTANYL CITRATE/PF 50 MCG/ML
25 SYRINGE (ML) INJECTION
Refills: 0 | Status: DISCONTINUED | OUTPATIENT
Start: 2025-07-10 | End: 2025-07-10 | Stop reason: HOSPADM

## 2025-07-10 RX ORDER — DOXYCYCLINE 100 MG/1
100 CAPSULE ORAL EVERY 12 HOURS SCHEDULED
Qty: 20 CAPSULE | Refills: 0 | Status: SHIPPED | OUTPATIENT
Start: 2025-07-10 | End: 2025-07-20

## 2025-07-10 RX ORDER — OXYCODONE HYDROCHLORIDE 5 MG/1
7.5 TABLET ORAL EVERY 6 HOURS PRN
Refills: 0 | Status: DISCONTINUED | OUTPATIENT
Start: 2025-07-10 | End: 2025-07-10 | Stop reason: HOSPADM

## 2025-07-10 RX ORDER — SODIUM CHLORIDE, SODIUM LACTATE, POTASSIUM CHLORIDE, CALCIUM CHLORIDE 600; 310; 30; 20 MG/100ML; MG/100ML; MG/100ML; MG/100ML
75 INJECTION, SOLUTION INTRAVENOUS CONTINUOUS
Status: DISCONTINUED | OUTPATIENT
Start: 2025-07-10 | End: 2025-07-10 | Stop reason: HOSPADM

## 2025-07-10 RX ORDER — GLYCOPYRROLATE 0.2 MG/ML
INJECTION INTRAMUSCULAR; INTRAVENOUS AS NEEDED
Status: DISCONTINUED | OUTPATIENT
Start: 2025-07-10 | End: 2025-07-10

## 2025-07-10 RX ADMIN — LIDOCAINE HYDROCHLORIDE 100 MG: 10 INJECTION, SOLUTION EPIDURAL; INFILTRATION; INTRACAUDAL; PERINEURAL at 10:43

## 2025-07-10 RX ADMIN — FENTANYL CITRATE 25 MCG: 50 INJECTION INTRAMUSCULAR; INTRAVENOUS at 12:55

## 2025-07-10 RX ADMIN — SUGAMMADEX 200 MG: 100 INJECTION, SOLUTION INTRAVENOUS at 12:10

## 2025-07-10 RX ADMIN — FENTANYL CITRATE 50 MCG: 50 INJECTION, SOLUTION INTRAMUSCULAR; INTRAVENOUS at 10:43

## 2025-07-10 RX ADMIN — GLYCOPYRROLATE 0.2 MG: 0.2 INJECTION INTRAMUSCULAR; INTRAVENOUS at 12:16

## 2025-07-10 RX ADMIN — SODIUM CHLORIDE, SODIUM LACTATE, POTASSIUM CHLORIDE, AND CALCIUM CHLORIDE: .6; .31; .03; .02 INJECTION, SOLUTION INTRAVENOUS at 12:10

## 2025-07-10 RX ADMIN — OXYCODONE HYDROCHLORIDE 5 MG: 5 TABLET ORAL at 14:01

## 2025-07-10 RX ADMIN — ONDANSETRON 4 MG: 2 INJECTION INTRAMUSCULAR; INTRAVENOUS at 10:48

## 2025-07-10 RX ADMIN — PROPOFOL 50 MG: 10 INJECTION, EMULSION INTRAVENOUS at 11:11

## 2025-07-10 RX ADMIN — PROPOFOL 120 MG: 10 INJECTION, EMULSION INTRAVENOUS at 10:43

## 2025-07-10 RX ADMIN — EPHEDRINE SULFATE 10 MG: 50 INJECTION, SOLUTION INTRAVENOUS at 10:57

## 2025-07-10 RX ADMIN — DEXAMETHASONE SODIUM PHOSPHATE 10 MG: 10 INJECTION, SOLUTION INTRAMUSCULAR; INTRAVENOUS at 10:48

## 2025-07-10 RX ADMIN — EPHEDRINE SULFATE 10 MG: 50 INJECTION, SOLUTION INTRAVENOUS at 10:52

## 2025-07-10 RX ADMIN — SODIUM CHLORIDE, SODIUM LACTATE, POTASSIUM CHLORIDE, AND CALCIUM CHLORIDE 125 ML/HR: .6; .31; .03; .02 INJECTION, SOLUTION INTRAVENOUS at 09:36

## 2025-07-10 RX ADMIN — CEFAZOLIN SODIUM 2000 MG: 2 SOLUTION INTRAVENOUS at 10:46

## 2025-07-10 RX ADMIN — FENTANYL CITRATE 50 MCG: 50 INJECTION, SOLUTION INTRAMUSCULAR; INTRAVENOUS at 11:07

## 2025-07-10 RX ADMIN — HYDROMORPHONE HYDROCHLORIDE 0.5 MG: 1 INJECTION, SOLUTION INTRAMUSCULAR; INTRAVENOUS; SUBCUTANEOUS at 11:15

## 2025-07-10 RX ADMIN — FENTANYL CITRATE 25 MCG: 50 INJECTION INTRAMUSCULAR; INTRAVENOUS at 13:12

## 2025-07-10 RX ADMIN — PHENYLEPHRINE HYDROCHLORIDE 20 MCG/MIN: 10 INJECTION INTRAVENOUS at 10:54

## 2025-07-10 RX ADMIN — ROCURONIUM BROMIDE 50 MG: 10 INJECTION, SOLUTION INTRAVENOUS at 10:43

## 2025-07-10 NOTE — OP NOTE
OPERATIVE REPORT  PATIENT NAME: Regino Mitchell    :  1950  MRN: [id number]  Pt Location: [facility] OR ROOM 02    SURGERY DATE: 7/10/2025    Surgeons and Role:     * Ozzy Arora MD - Primary     * Hugh Thakkar PA-C - Assisting  The PA was necessary to provide expert assistance; i.e. in the form of providing optimal exposure with retraction, suturing, and assistance with dissection in order to perform the most efficient operation and in order to optimize patient safety in the abscence of a qualified surgical resident.    Preop Diagnosis:  Unilateral inguinal hernia without obstruction or gangrene, recurrence not specified [K40.90]    Post-Op Diagnosis Codes:     * Unilateral inguinal hernia without obstruction or gangrene, recurrence not specified [K40.90]    Procedure(s):  Right - REPAIR HERNIA INGUINAL. LAPAROSCOPIC    Specimen(s):  * No specimens in log *    Estimated Blood Loss:   Minimal    Drains:  Urethral Catheter Latex;Straight-tip 16 Fr. (Active)   Number of days: 0       Anesthesia Type:   General    Operative Indications:  Unilateral inguinal hernia without obstruction or gangrene, recurrence not specified [K40.90]  The patient is a pleasant 75-year-old male presenting with a symptomatic right inguinal hernia for which definitive treatment by laparoscopic mesh repair is now indicated.    Operative Findings:  An indirect right inguinal hernia was identified.    Repair complete pleated with large Bard 3D max mesh.     Complications:   None    Procedure and Technique:  Patient was taken to the operating room where they were properly identified monitored and anesthetized.  The received antibiotics perioperatively.  Sequential compression device used for deep vein thrombosis prophylaxis.  Daniels catheter placed.  Abdomen prepped and draped under sterile conditions using aseptic technique.  Time-out performed.    Skin incised laterally on the abdomen.  5 mm trocar advanced bluntly.   Pneumoperitoneum established to 15 mmHg.  5 mm 30 degree scope advanced.  Four quadrants of the abdomen inspected as was the pelvis.  Two additional working ports placed under direct visualization.  These were a 5 mm periumbilical port and a 11 mm lateral abdominal wall port.    Patient placed in Trendelenburg.  The inguinal hernia defined.  The peritoneum scored with Harmonic david beginning at the medial umbilical ligament and extending laterally across the abdominal wall pain careful attention to avoid injury to the inferior epigastric vessels.  Dissection carried laterally to the level of the anterior superior iliac spine.  The peritoneum stripped down lateral to the inguinal hernia.  The iliohypogastric nerve confidently identified and preserved.    Our attention turned to the medial dissection where the bladder was dissected posteriorly and the Rudolph's ligament identified medially.  Pubic tubercle dissected out to the midline.    Next our attention turned to reduction of the inguinal hernia sac.  Peritoneum stripped down and freed from the spermatic cord laterally and the vas deferens medially.  Careful attention paid during the dissection to avoid injury to the inferior epigastric vessels as well as the iliac artery and vein.    Satisfied that the peritoneum was fully reduced out of the hernia defect a large Bard 3DMax mesh was advanced into the peritoneal cavity and placed over the defect.  It was secured with several absorbable tacks.  The peritoneum was then closed over the mesh and secured with absorbable tacks.    A rent in the peritoneum was oversewn with 2 figure-of-eight sutures of 3-0 Vicryl.    The procedure concluded with closure of the 11 mm defect using the Bijan Berry device and an 0 Vicryl suture.  The pneumoperitoneum was released.  The 5 mm trocars removed.  The skin closed with subcuticular 4-0 Monocryl suture.  Wounds infiltrated with 0.5% Marcaine.  Wounds dressed.  The testicles  palpated in the scrotum.  The patient extubated and taken to recovery in stable condition.     I was present for the entire procedure.    Patient Disposition:  PACU          SIGNATURE: Ozzy Arora MD  DATE: July 10, 2025  TIME: 12:14 PM

## 2025-07-10 NOTE — INTERVAL H&P NOTE
H&P reviewed. After examining the patient I find no changes in the patients condition since the H&P had been written.    Vitals:    07/10/25 0926   BP: (!) 183/87   Pulse: 63   Resp: 17   Temp: 98.2 °F (36.8 °C)   SpO2: 99%

## 2025-07-10 NOTE — ANESTHESIA PREPROCEDURE EVALUATION
Procedure:  REPAIR HERNIA INGUINAL, LAPAROSCOPIC (Right: Groin)    Relevant Problems   CARDIO   (+) Mixed hyperlipidemia   (+) Peripheral vascular disease (HCC)      ENDO   (+) Acquired hypothyroidism      GI/HEPATIC   (+) Dilated pancreatic duct      NEURO/PSYCH   (+) Seizure (HCC)      Last had seizure ~2 years ago now on depakote, no reoccurrence.     Echo 7/2022   Left Ventricle: Left ventricular cavity size is normal. Wall thickness is normal. Systolic function is normal. Wall motion is normal. Diastolic function is normal.     NPO 7/9      Physical Exam    Airway     Mallampati score: III  TM Distance: >3 FB  Neck ROM: full      Cardiovascular  Cardiovascular exam normal    Dental       Pulmonary  Pulmonary exam normal     Neurological      Other Findings        Anesthesia Plan  ASA Score- 3     Anesthesia Type- general with ASA Monitors.         Additional Monitors:     Airway Plan: Oral ETT.           Plan Factors-Exercise tolerance (METS): >4 METS.    Chart reviewed. EKG reviewed.  Existing labs reviewed. Patient summary reviewed.          Obstructive sleep apnea risk education given perioperatively.        Induction- intravenous.    Postoperative Plan- Plan for postoperative opioid use.   Monitoring Plan - Monitoring plan - standard ASA monitoring and train of four monitoring  Post Operative Pain Plan - non-opiod analgesics, plan for postoperative opioid use and multimodal analgesia    Perioperative Resuscitation Plan - Level 1 - Full Code.       Informed Consent- Anesthetic plan and risks discussed with patient.  I personally reviewed this patient with the CRNA. Discussed and agreed on the Anesthesia Plan with the CRNA..      NPO Status:  Vitals Value Taken Time   Date of last liquid 07/09/25 07/10/25 09:11   Time of last liquid 2200 07/10/25 09:11   Date of last solid 07/09/25 07/10/25 09:11   Time of last solid 2200 07/10/25 09:11

## 2025-07-10 NOTE — DISCHARGE INSTR - AVS FIRST PAGE
Discharge Instructions    Please review material sent to you (or printed for you) through [employer] from [facility] following your surgery. A summary of the most important instructions are provided below and may be more specific to you and the surgery you had.    Light activity for 2 weeks.  No heavy lifting for 2 weeks.  No driving for 5-7 days or until pain is well controlled.  Surgical glue will fall off with time.  Remove any dressings in 2 days.  You may shower starting tomorrow.  Take discharge medications as prescribed.  Notify our office for nausea, vomiting, fever, diarrhea, chest pain, trouble breathing.  Follow up in our office in 2 weeks or sooner if needed.  Call with additional questions or concerns [phone].    For pain you may take ibuprofen 600 mg every 6 hours scheduled (with food) for 3 days then as needed.  You can also take acetaminophen 650 mg every 6 hours scheduled for 3 days then as needed.  For ongoing pain you can alternate ibuprofen and acetaminophen every 3 hours.  If pain not controlled with this regimen you can use Percocet as prescribed, do not take Percocet and Tylenol within 6 hours of each other.  Ice packs may be helpful, 20 min on, 20 min off alternating and monitoring skin.

## 2025-07-11 NOTE — ANESTHESIA POSTPROCEDURE EVALUATION
Post-Op Assessment Note    CV Status:  Stable  Pain Score: 0    Pain management: adequate       Mental Status:  Alert and awake   Hydration Status:  Euvolemic   PONV Controlled:  Controlled   Airway Patency:  Patent     Post Op Vitals Reviewed: Yes    No anethesia notable event occurred.    Staff: Anesthesiologist, CRNA           Last Filed PACU Vitals:  Vitals Value Taken Time   Temp 97.3 °F (36.3 °C) 07/10/25 13:40   Pulse 101 07/10/25 13:40   /93 07/10/25 13:40   Resp 20 07/10/25 13:40   SpO2 95 % 07/10/25 13:40       Modified Pawan:     Vitals Value Taken Time   Activity 2 07/10/25 13:40   Respiration 2 07/10/25 13:40   Circulation 2 07/10/25 13:40   Consciousness 2 07/10/25 13:40   Oxygen Saturation 2 07/10/25 13:40     Modified Pawan Score: 10

## 2025-07-17 ENCOUNTER — APPOINTMENT (OUTPATIENT)
Dept: LAB | Facility: CLINIC | Age: 75
End: 2025-07-17
Attending: INTERNAL MEDICINE
Payer: MEDICARE

## 2025-07-17 ENCOUNTER — APPOINTMENT (OUTPATIENT)
Dept: RADIOLOGY | Facility: MEDICAL CENTER | Age: 75
End: 2025-07-17
Payer: MEDICARE

## 2025-07-17 ENCOUNTER — HOME HEALTH ADMISSION (OUTPATIENT)
Dept: HOME HEALTH SERVICES | Facility: HOME HEALTHCARE | Age: 75
End: 2025-07-17
Payer: MEDICARE

## 2025-07-17 ENCOUNTER — TELEPHONE (OUTPATIENT)
Age: 75
End: 2025-07-17

## 2025-07-17 ENCOUNTER — OFFICE VISIT (OUTPATIENT)
Dept: INTERNAL MEDICINE CLINIC | Facility: CLINIC | Age: 75
End: 2025-07-17
Payer: MEDICARE

## 2025-07-17 VITALS
BODY MASS INDEX: 24.79 KG/M2 | HEIGHT: 65 IN | DIASTOLIC BLOOD PRESSURE: 84 MMHG | HEART RATE: 73 BPM | SYSTOLIC BLOOD PRESSURE: 126 MMHG | WEIGHT: 148.8 LBS | OXYGEN SATURATION: 96 % | TEMPERATURE: 97.9 F

## 2025-07-17 DIAGNOSIS — R32 URINARY INCONTINENCE, UNSPECIFIED TYPE: ICD-10-CM

## 2025-07-17 DIAGNOSIS — M96.89 POSTOPERATIVE SURGICAL COMPLICATION INVOLVING MUSCULOSKELETAL SYSTEM ASSOCIATED WITH MUSCULOSKELETAL PROCEDURE, UNSPECIFIED COMPLICATION: ICD-10-CM

## 2025-07-17 DIAGNOSIS — R29.6 MULTIPLE FALLS: ICD-10-CM

## 2025-07-17 DIAGNOSIS — R63.0 DECREASED APPETITE: ICD-10-CM

## 2025-07-17 DIAGNOSIS — Z91.148 NONCOMPLIANCE WITH MEDICATION REGIMEN: ICD-10-CM

## 2025-07-17 DIAGNOSIS — R62.7 ADULT FAILURE TO THRIVE: Primary | ICD-10-CM

## 2025-07-17 DIAGNOSIS — R53.1 WEAKNESS: ICD-10-CM

## 2025-07-17 DIAGNOSIS — R41.0 CONFUSION: ICD-10-CM

## 2025-07-17 DIAGNOSIS — R79.9 ABNORMAL FINDING OF BLOOD CHEMISTRY, UNSPECIFIED: ICD-10-CM

## 2025-07-17 DIAGNOSIS — R07.81 RIB PAIN ON LEFT SIDE: ICD-10-CM

## 2025-07-17 LAB
ALBUMIN SERPL BCG-MCNC: 4.2 G/DL (ref 3.5–5)
ALP SERPL-CCNC: 36 U/L (ref 34–104)
ALT SERPL W P-5'-P-CCNC: 23 U/L (ref 7–52)
ANION GAP SERPL CALCULATED.3IONS-SCNC: 6 MMOL/L (ref 4–13)
AST SERPL W P-5'-P-CCNC: 48 U/L (ref 13–39)
BASOPHILS # BLD AUTO: 0.03 THOUSANDS/ÂΜL (ref 0–0.1)
BASOPHILS NFR BLD AUTO: 0 % (ref 0–1)
BILIRUB SERPL-MCNC: 1.74 MG/DL (ref 0.2–1)
BUN SERPL-MCNC: 18 MG/DL (ref 5–25)
CALCIUM SERPL-MCNC: 9.4 MG/DL (ref 8.4–10.2)
CHLORIDE SERPL-SCNC: 102 MMOL/L (ref 96–108)
CO2 SERPL-SCNC: 31 MMOL/L (ref 21–32)
CREAT SERPL-MCNC: 0.72 MG/DL (ref 0.6–1.3)
EOSINOPHIL # BLD AUTO: 0.15 THOUSAND/ÂΜL (ref 0–0.61)
EOSINOPHIL NFR BLD AUTO: 2 % (ref 0–6)
ERYTHROCYTE [DISTWIDTH] IN BLOOD BY AUTOMATED COUNT: 13.6 % (ref 11.6–15.1)
GFR SERPL CREATININE-BSD FRML MDRD: 91 ML/MIN/1.73SQ M
GLUCOSE P FAST SERPL-MCNC: 97 MG/DL (ref 65–99)
HCT VFR BLD AUTO: 41 % (ref 36.5–49.3)
HGB BLD-MCNC: 13.5 G/DL (ref 12–17)
IMM GRANULOCYTES # BLD AUTO: 0.02 THOUSAND/UL (ref 0–0.2)
IMM GRANULOCYTES NFR BLD AUTO: 0 % (ref 0–2)
LYMPHOCYTES # BLD AUTO: 2.53 THOUSANDS/ÂΜL (ref 0.6–4.47)
LYMPHOCYTES NFR BLD AUTO: 32 % (ref 14–44)
MCH RBC QN AUTO: 30.6 PG (ref 26.8–34.3)
MCHC RBC AUTO-ENTMCNC: 32.9 G/DL (ref 31.4–37.4)
MCV RBC AUTO: 93 FL (ref 82–98)
MONOCYTES # BLD AUTO: 0.83 THOUSAND/ÂΜL (ref 0.17–1.22)
MONOCYTES NFR BLD AUTO: 11 % (ref 4–12)
NEUTROPHILS # BLD AUTO: 4.37 THOUSANDS/ÂΜL (ref 1.85–7.62)
NEUTS SEG NFR BLD AUTO: 55 % (ref 43–75)
NRBC BLD AUTO-RTO: 0 /100 WBCS
PLATELET # BLD AUTO: 150 THOUSANDS/UL (ref 149–390)
PMV BLD AUTO: 11 FL (ref 8.9–12.7)
POTASSIUM SERPL-SCNC: 4 MMOL/L (ref 3.5–5.3)
PROT SERPL-MCNC: 6.4 G/DL (ref 6.4–8.4)
RBC # BLD AUTO: 4.41 MILLION/UL (ref 3.88–5.62)
SODIUM SERPL-SCNC: 139 MMOL/L (ref 135–147)
TSH SERPL DL<=0.05 MIU/L-ACNC: 4 UIU/ML (ref 0.45–4.5)
VALPROATE SERPL-MCNC: 69 ÂΜG/ML (ref 50–125)
WBC # BLD AUTO: 7.93 THOUSAND/UL (ref 4.31–10.16)

## 2025-07-17 PROCEDURE — 85025 COMPLETE CBC W/AUTO DIFF WBC: CPT | Performed by: INTERNAL MEDICINE

## 2025-07-17 PROCEDURE — 99214 OFFICE O/P EST MOD 30 MIN: CPT | Performed by: INTERNAL MEDICINE

## 2025-07-17 PROCEDURE — G2211 COMPLEX E/M VISIT ADD ON: HCPCS | Performed by: INTERNAL MEDICINE

## 2025-07-17 PROCEDURE — 80164 ASSAY DIPROPYLACETIC ACD TOT: CPT | Performed by: INTERNAL MEDICINE

## 2025-07-17 PROCEDURE — 36415 COLL VENOUS BLD VENIPUNCTURE: CPT | Performed by: INTERNAL MEDICINE

## 2025-07-17 PROCEDURE — 84443 ASSAY THYROID STIM HORMONE: CPT | Performed by: INTERNAL MEDICINE

## 2025-07-17 PROCEDURE — 83036 HEMOGLOBIN GLYCOSYLATED A1C: CPT | Performed by: INTERNAL MEDICINE

## 2025-07-17 PROCEDURE — 71101 X-RAY EXAM UNILAT RIBS/CHEST: CPT

## 2025-07-17 PROCEDURE — 80053 COMPREHEN METABOLIC PANEL: CPT | Performed by: INTERNAL MEDICINE

## 2025-07-17 NOTE — PROGRESS NOTES
Name: Regino Mitchell      : 1950      MRN: [id number]  Encounter Provider: Reg Palomares DO  Encounter Date: 2025   Encounter department: [facility]  :  Assessment & Plan  Adult failure to thrive    Orders:    CBC and differential    Comprehensive metabolic panel    Hemoglobin A1C    TSH, 3rd generation    Valproic acid level, total    Referral to [facility]    XR ribs left w pa chest min 3 views    UA w Reflex to Microscopic w Reflex to Culture    Multiple falls    Orders:    CBC and differential    Comprehensive metabolic panel    Hemoglobin A1C    TSH, 3rd generation    Valproic acid level, total    Referral to [facility]    XR ribs left w pa chest min 3 views    UA w Reflex to Microscopic w Reflex to Culture    Decreased appetite    Orders:    CBC and differential    Comprehensive metabolic panel    Hemoglobin A1C    TSH, 3rd generation    Valproic acid level, total    Referral to [facility]    XR ribs left w pa chest min 3 views    UA w Reflex to Microscopic w Reflex to Culture    Weakness    Orders:    CBC and differential    Comprehensive metabolic panel    Hemoglobin A1C    TSH, 3rd generation    Valproic acid level, total    Referral to [facility]    XR ribs left w pa chest min 3 views    UA w Reflex to Microscopic w Reflex to Culture    Urinary incontinence, unspecified type    Orders:    CBC and differential    Comprehensive metabolic panel    Hemoglobin A1C    TSH, 3rd generation    Valproic acid level, total    Referral to [facility]    XR ribs left w pa chest min 3 views    UA w Reflex to Microscopic w Reflex to Culture    Confusion    Orders:    CBC and differential    Comprehensive metabolic panel    Hemoglobin A1C    TSH, 3rd generation    Valproic acid level, total    Referral to [facility]    XR ribs left w pa chest min 3 views    UA w Reflex to Microscopic w  Reflex to Culture    Noncompliance with medication regimen    Orders:    CBC and differential    Comprehensive metabolic panel    Hemoglobin A1C    TSH, 3rd generation    Valproic acid level, total    Referral to [facility]    XR ribs left w pa chest min 3 views    UA w Reflex to Microscopic w Reflex to Culture    Abnormal finding of blood chemistry, unspecified    Orders:    Hemoglobin A1C    Rib pain on left side         A/P: Doing poorly. Pt not compliant with meds and may be a major issue,especially the thyroid replacement,  but also feel pt has some dementia starting, as evident from being taken out of his house and increase confusion. Will check labs. R/o infection. NO s/s of rib fx grossly, but will check imaging. Suspect the loss of his spouse playing a major role as well. Don't know if pt will be able to live alone. NO sz activity and ?taking his meds as well. Knee pain an issue and to f/u with ortho and ?another round of steroids temporarily, but not ready for TKA at this point in time. Will get VNA in for PT and overall assessment. Pt to restart his meds and RTC one week for f/u. May need head imaging as well.        History of Present Illness   WM presents with family due to overall decline lately. Pt with similar event earlier this year, but bounced back. Has been declining since the loss of his spouse last year. Pt s/p recent hernia repair. Pt with increased weakness, multiple falls, weakness, and decreased appetite. SOme urinary incontinence. Staying with family and some confusion at times. C/o left rib and chest pain. Some constipation. No new meds, dietary changes, or exposures. No recent illnesses. No fever or chills. Pt reportedly non Compliant with meds. Denies CP, SOB, palpitations, orthopnea, or PND. NO ETOH use. No stroke like events. Pt c/o increase knee pain causing gait issues. Seeing ortho.       Review of Systems   Constitutional:  Positive for activity change and  "appetite change. Negative for chills, diaphoresis, fatigue and fever.   HENT: Negative.     Eyes:  Negative for visual disturbance.   Respiratory:  Negative for cough, chest tightness, shortness of breath and wheezing.    Cardiovascular:  Negative for chest pain, palpitations and leg swelling.   Gastrointestinal:  Positive for constipation. Negative for abdominal pain, diarrhea, nausea and vomiting.   Endocrine: Negative for cold intolerance and heat intolerance.   Genitourinary:  Positive for urgency. Negative for difficulty urinating, dysuria and frequency.        Incontinence   Musculoskeletal:  Positive for arthralgias and gait problem. Negative for back pain and myalgias.   Skin:  Positive for wound.   Neurological:  Positive for weakness. Negative for dizziness, tremors, seizures, syncope, facial asymmetry, speech difficulty, light-headedness, numbness and headaches.   Psychiatric/Behavioral:  Positive for confusion and sleep disturbance. Negative for agitation, behavioral problems, decreased concentration, dysphoric mood and hallucinations. The patient is not nervous/anxious.        Objective   /84 (BP Location: Right arm, Patient Position: Sitting)   Pulse 73   Temp 97.9 °F (36.6 °C)   Ht 5' 5\" (1.651 m)   Wt 67.5 kg (148 lb 12.8 oz)   SpO2 96%   BMI 24.76 kg/m²      Physical Exam  Vitals and nursing note reviewed.   Constitutional:       General: He is not in acute distress.     Appearance: Normal appearance. He is not ill-appearing.   HENT:      Head: Normocephalic and atraumatic.      Mouth/Throat:      Mouth: Mucous membranes are moist.     Eyes:      Extraocular Movements: Extraocular movements intact.      Conjunctiva/sclera: Conjunctivae normal.      Pupils: Pupils are equal, round, and reactive to light.     Neck:      Vascular: No carotid bruit.     Cardiovascular:      Rate and Rhythm: Normal rate and regular rhythm.      Heart sounds: Normal heart sounds. No murmur heard.  Pulmonary: "      Effort: Pulmonary effort is normal. No respiratory distress.      Breath sounds: Normal breath sounds. No wheezing, rhonchi or rales.   Abdominal:      General: There is no distension.      Palpations: Abdomen is soft.      Tenderness: There is no abdominal tenderness.     Musculoskeletal:         General: Tenderness (left ribs painful.) present.      Cervical back: Neck supple. No tenderness.      Right lower leg: No edema.      Left lower leg: No edema.      Comments: Gait bad with balance issues and difficulty getting out of chair.      Skin:     Findings: Lesion (left ACW abrasion.) present.     Neurological:      General: No focal deficit present.      Mental Status: He is alert and oriented to person, place, and time. Mental status is at baseline.      Cranial Nerves: No cranial nerve deficit.      Motor: No weakness.      Coordination: Coordination normal.      Gait: Gait abnormal.      Deep Tendon Reflexes: Reflexes normal.     Psychiatric:         Mood and Affect: Mood normal.         Behavior: Behavior normal.         Thought Content: Thought content normal.         Judgment: Judgment normal.

## 2025-07-17 NOTE — TELEPHONE ENCOUNTER
Daughter called stating patient is on his way for 0815 am appointment with PCP but is on the way. Daughter aware of 15 minute late policy but states they will make it before 0830 am. Appointment notes updated and secure chat sent.

## 2025-07-18 ENCOUNTER — APPOINTMENT (OUTPATIENT)
Dept: LAB | Facility: CLINIC | Age: 75
End: 2025-07-18
Payer: MEDICARE

## 2025-07-18 ENCOUNTER — HOME CARE VISIT (OUTPATIENT)
Dept: HOME HEALTH SERVICES | Facility: HOME HEALTHCARE | Age: 75
End: 2025-07-18
Attending: INTERNAL MEDICINE
Payer: MEDICARE

## 2025-07-18 VITALS
TEMPERATURE: 97.3 F | DIASTOLIC BLOOD PRESSURE: 72 MMHG | SYSTOLIC BLOOD PRESSURE: 146 MMHG | OXYGEN SATURATION: 95 % | RESPIRATION RATE: 16 BRPM | HEART RATE: 92 BPM

## 2025-07-18 LAB
BACTERIA UR QL AUTO: ABNORMAL /HPF
BILIRUB UR QL STRIP: NEGATIVE
CAOX CRY URNS QL MICRO: ABNORMAL /HPF
CLARITY UR: ABNORMAL
COLOR UR: ABNORMAL
CREAT UR-MCNC: 383.9 MG/DL
EST. AVERAGE GLUCOSE BLD GHB EST-MCNC: 117 MG/DL
GLUCOSE UR STRIP-MCNC: NEGATIVE MG/DL
HBA1C MFR BLD: 5.7 %
HGB UR QL STRIP.AUTO: NEGATIVE
KETONES UR STRIP-MCNC: ABNORMAL MG/DL
LEUKOCYTE ESTERASE UR QL STRIP: NEGATIVE
MICROALBUMIN UR-MCNC: 27.1 MG/L
MICROALBUMIN/CREAT 24H UR: 7 MG/G CREATININE (ref 0–30)
MUCOUS THREADS UR QL AUTO: ABNORMAL
NITRITE UR QL STRIP: NEGATIVE
NON-SQ EPI CELLS URNS QL MICRO: ABNORMAL /HPF
PH UR STRIP.AUTO: 6 [PH]
PROT UR STRIP-MCNC: ABNORMAL MG/DL
RBC #/AREA URNS AUTO: ABNORMAL /HPF
SP GR UR STRIP.AUTO: 1.03 (ref 1–1.03)
UROBILINOGEN UR STRIP-ACNC: 3 MG/DL
WBC #/AREA URNS AUTO: ABNORMAL /HPF

## 2025-07-18 PROCEDURE — G0299 HHS/HOSPICE OF RN EA 15 MIN: HCPCS

## 2025-07-18 PROCEDURE — 82570 ASSAY OF URINE CREATININE: CPT

## 2025-07-18 PROCEDURE — 10330081 VN NO-PAY CLAIM PROCEDURE

## 2025-07-18 PROCEDURE — 400013 VN SOC

## 2025-07-18 PROCEDURE — 82043 UR ALBUMIN QUANTITATIVE: CPT

## 2025-07-18 PROCEDURE — 81001 URINALYSIS AUTO W/SCOPE: CPT

## 2025-07-21 ENCOUNTER — HOME CARE VISIT (OUTPATIENT)
Dept: HOME HEALTH SERVICES | Facility: HOME HEALTHCARE | Age: 75
End: 2025-07-21
Payer: MEDICARE

## 2025-07-21 VITALS
SYSTOLIC BLOOD PRESSURE: 132 MMHG | HEART RATE: 74 BPM | TEMPERATURE: 97.3 F | OXYGEN SATURATION: 97 % | DIASTOLIC BLOOD PRESSURE: 70 MMHG | RESPIRATION RATE: 18 BRPM

## 2025-07-21 VITALS — HEART RATE: 78 BPM | DIASTOLIC BLOOD PRESSURE: 80 MMHG | SYSTOLIC BLOOD PRESSURE: 120 MMHG | OXYGEN SATURATION: 94 %

## 2025-07-21 PROCEDURE — G0299 HHS/HOSPICE OF RN EA 15 MIN: HCPCS

## 2025-07-21 PROCEDURE — G0151 HHCP-SERV OF PT,EA 15 MIN: HCPCS

## 2025-07-22 ENCOUNTER — OFFICE VISIT (OUTPATIENT)
Dept: OBGYN CLINIC | Facility: CLINIC | Age: 75
End: 2025-07-22
Payer: MEDICARE

## 2025-07-22 VITALS — WEIGHT: 148 LBS | HEIGHT: 65 IN | BODY MASS INDEX: 24.66 KG/M2

## 2025-07-22 DIAGNOSIS — Z01.812 PRE-OPERATIVE LABORATORY EXAMINATION: ICD-10-CM

## 2025-07-22 DIAGNOSIS — M17.12 PRIMARY OSTEOARTHRITIS OF LEFT KNEE: ICD-10-CM

## 2025-07-22 DIAGNOSIS — M17.0 PRIMARY OSTEOARTHRITIS OF BOTH KNEES: Primary | ICD-10-CM

## 2025-07-22 PROCEDURE — 99213 OFFICE O/P EST LOW 20 MIN: CPT | Performed by: ORTHOPAEDIC SURGERY

## 2025-07-22 PROCEDURE — 20610 DRAIN/INJ JOINT/BURSA W/O US: CPT | Performed by: ORTHOPAEDIC SURGERY

## 2025-07-22 RX ORDER — CEFAZOLIN SODIUM 2 G/50ML
2000 SOLUTION INTRAVENOUS ONCE
OUTPATIENT
Start: 2025-07-22 | End: 2025-07-22

## 2025-07-22 RX ORDER — BUPIVACAINE HYDROCHLORIDE 2.5 MG/ML
4 INJECTION, SOLUTION INFILTRATION; PERINEURAL
Status: COMPLETED | OUTPATIENT
Start: 2025-07-22 | End: 2025-07-22

## 2025-07-22 RX ORDER — CHLORHEXIDINE GLUCONATE 40 MG/ML
SOLUTION TOPICAL DAILY PRN
OUTPATIENT
Start: 2025-07-22

## 2025-07-22 RX ORDER — MULTIVIT-MIN/IRON FUM/FOLIC AC 7.5 MG-4
1 TABLET ORAL DAILY
Qty: 30 TABLET | Refills: 1 | Status: SHIPPED | OUTPATIENT
Start: 2025-07-22

## 2025-07-22 RX ORDER — TRIAMCINOLONE ACETONIDE 40 MG/ML
80 INJECTION, SUSPENSION INTRA-ARTICULAR; INTRAMUSCULAR
Status: COMPLETED | OUTPATIENT
Start: 2025-07-22 | End: 2025-07-22

## 2025-07-22 RX ORDER — MUPIROCIN 2 %
OINTMENT (GRAM) TOPICAL
Qty: 10 G | Refills: 0 | Status: SHIPPED | OUTPATIENT
Start: 2025-07-22

## 2025-07-22 RX ORDER — FOLIC ACID 1 MG/1
1 TABLET ORAL DAILY
Qty: 30 TABLET | Refills: 1 | Status: SHIPPED | OUTPATIENT
Start: 2025-07-22 | End: 2025-09-20

## 2025-07-22 RX ORDER — SODIUM CHLORIDE, SODIUM LACTATE, POTASSIUM CHLORIDE, CALCIUM CHLORIDE 600; 310; 30; 20 MG/100ML; MG/100ML; MG/100ML; MG/100ML
20 INJECTION, SOLUTION INTRAVENOUS CONTINUOUS
OUTPATIENT
Start: 2025-07-22

## 2025-07-22 RX ORDER — CHLORHEXIDINE GLUCONATE ORAL RINSE 1.2 MG/ML
15 SOLUTION DENTAL ONCE
OUTPATIENT
Start: 2025-07-22 | End: 2025-07-22

## 2025-07-22 RX ORDER — FERROUS SULFATE 324(65)MG
324 TABLET, DELAYED RELEASE (ENTERIC COATED) ORAL
Qty: 60 TABLET | Refills: 1 | Status: SHIPPED | OUTPATIENT
Start: 2025-07-22

## 2025-07-22 RX ORDER — ASCORBIC ACID 500 MG
500 TABLET ORAL 2 TIMES DAILY
Qty: 60 TABLET | Refills: 1 | Status: SHIPPED | OUTPATIENT
Start: 2025-07-22 | End: 2025-09-20

## 2025-07-22 RX ADMIN — BUPIVACAINE HYDROCHLORIDE 4 ML: 2.5 INJECTION, SOLUTION INFILTRATION; PERINEURAL at 14:15

## 2025-07-22 RX ADMIN — TRIAMCINOLONE ACETONIDE 80 MG: 40 INJECTION, SUSPENSION INTRA-ARTICULAR; INTRAMUSCULAR at 14:15

## 2025-07-22 NOTE — PROGRESS NOTES
Assessment & Plan  Primary osteoarthritis of left knee  Discussed the patient's diagnosis and associated treatment options including conservative and surgical treatment. Discussed risks, potential complications, and benefits of surgical procedure in great detail. The patient understands the risks and benefits of all mention treatment options and has no further questions.  The patient has elected to proceed forward with total knee arthroplasty of the left knee. Surgery is tentatively scheduled for 10/29/25. He will be seen for follow-up 10 to 14 days post-op for reevaluation. A surgical consent was obtained at today's visit. The patient expressed understanding and had the opportunity to ask questions.         Pre-operative laboratory examination         The patient has advanced arthritic changes of his bilateral knees.  Under aseptic technique, both knees were injected with Kenalog and Marcaine.  He tolerated procedures well.  Return back in 3 months for evaluation.  He also wants to proceed with elective left total knee replacement.  All risk, complications, and benefits were discussed with the patient in great detail including bleeding, infection, blood clots, pain, stiffness, neurovascular damage, fractures, dislocations, the possibility loss elective surgery, heart attack, wound problems, etc.  His surgery scheduled for October 29, 2025    Subjective:   Patient ID: Regino Mitchell  1950     HPI  Patient is a 75 y.o. male who presents for a follow up for her bilateral knee pain. She received bilateral CSI's on 4/22/25 which provided several weeks of relief. Of note, he is 2 weeks s/p hernia surgery. Both the patient and daughter express that his legs have become weak since surgery in which he has decreased his walking. He is slowly improving with home therapy.     Patient expressed that he would like to proceed with a L TKA.     The following portions of the patient's history were reviewed and updated as  "appropriate:  Past medical history, past surgical history, Family history, social history, current medications and allergies    Past Medical History[1]    Past Surgical History[2]    Family History[3]    Social History[4]    Current Medications[5]    No Known Allergies    Review of Systems   Constitutional:  Negative for chills and fever.   HENT:  Negative for ear pain and sore throat.    Eyes:  Negative for pain and visual disturbance.   Respiratory:  Negative for cough and shortness of breath.    Cardiovascular:  Negative for chest pain and palpitations.   Gastrointestinal:  Negative for abdominal pain and vomiting.   Genitourinary:  Negative for dysuria and hematuria.   Musculoskeletal:  Negative for arthralgias and back pain.   Skin:  Negative for color change and rash.   Neurological:  Negative for seizures and syncope.   All other systems reviewed and are negative.       Objective:  Ht 5' 5\" (1.651 m)   Wt 67.1 kg (148 lb)   BMI 24.63 kg/m²     Ortho Exam  Bilateral lower extremities are neurovascularly intact  Toes are pink and mobile   Compartments are soft  No warmth, erythema or ecchymosis  ROM of knees are from 5-115 degrees  Negative Lachman, drawer or pivot shift  No medial instability  Medial joint line tenderness, slight lateral joint line tenderness  Patellofemoral c    Physical Exam     Diagnostic Test Review:  No new images     Large joint arthrocentesis: R knee    Performed by: Reg Prescott DO  Authorized by: Reg Prescott DO    Universal Protocol:  procedure performed by consultantConsent: Verbal consent obtained  Risks and benefits: risks, benefits and alternatives were discussed  Consent given by: patient  Patient understanding: patient states understanding of the procedure being performed  Site marked: the operative site was marked  Radiology Images displayed and confirmed. If images not available, report reviewed: imaging studies available  Patient identity confirmed: verbally " with patient  Supporting Documentation  Indications: pain and joint swelling     Is this a Visco injection? NoProcedure Details  Location: knee - R knee  Needle gauge: 21 G.  Ultrasound guidance: no  Approach: lateral  Medications administered: 4 mL bupivacaine 0.25 %; 80 mg triamcinolone acetonide 40 mg/mL    Patient tolerance: patient tolerated the procedure well with no immediate complications  Dressing:  Sterile dressing applied      Large joint arthrocentesis: L knee    Performed by: Reg Prescott DO  Authorized by: Reg Prescott DO    Universal Protocol:  procedure performed by consultantConsent: Verbal consent obtained  Risks and benefits: risks, benefits and alternatives were discussed  Consent given by: patient  Patient understanding: patient states understanding of the procedure being performed  Site marked: the operative site was marked  Radiology Images displayed and confirmed. If images not available, report reviewed: imaging studies available  Patient identity confirmed: verbally with patient  Supporting Documentation  Indications: pain and joint swelling     Is this a Visco injection? NoProcedure Details  Location: knee - L knee  Needle size: 22 G (21 G)  Ultrasound guidance: no  Approach: lateral  Medications administered: 4 mL bupivacaine 0.25 %; 80 mg triamcinolone acetonide 40 mg/mL    Patient tolerance: patient tolerated the procedure well with no immediate complications  Dressing:  Sterile dressing applied             Scribe Attestation      I,:  Kaden Humphries am acting as a scribe while in the presence of the attending physician.:       I,:  Reg Prescott DO personally performed the services described in this documentation    as scribed in my presence.:                      [1]   Past Medical History:  Diagnosis Date    Depression     Disease of thyroid gland     Fall     1/2025    High cholesterol     Memory loss     Seizure (HCC)    [2]   Past Surgical History:  Procedure  Laterality Date    DENTAL SURGERY      HERNIA REPAIR      when he was a kid    [place] LAPAROSCOPY SURG RPR INITIAL INGUINAL HERNIA Right 7/10/2025    Procedure: REPAIR HERNIA INGUINAL, LAPAROSCOPIC;  Surgeon: Ozzy Arora MD;  Location: [facility];  Service: General    WRIST SURGERY Right     when he was a kid   [3]   Family History  Problem Relation Name Age of Onset    Cancer Mother      Diabetes Mother      Prostate cancer Father      Cancer Brother      Prostate cancer Brother     [4]   Social History  Socioeconomic History    Marital status:    Occupational History    Occupation: retired   Tobacco Use    Smoking status: Former     Types: Cigars     Quit date:      Years since quittin.5     Passive exposure: Past    Smokeless tobacco: Never    Tobacco comments:     cigar use - social    Vaping Use    Vaping status: Never Used   Substance and Sexual Activity    Alcohol use: Not Currently     Comment: one beers/ day    Drug use: Never    Sexual activity: Yes     Partners: Female   Social History Narrative        One child    Retired nicole    Lives with his wife.     Wife past 2024      Social Drivers of Health     Financial Resource Strain: Low Risk  (2023)    Overall Financial Resource Strain (CARDIA)     Difficulty of Paying Living Expenses: Not hard at all   Food Insecurity: Patient Declined (7/10/2025)    Nursing - Inadequate Food Risk Classification     Worried About Running Out of Food in the Last Year: Never true     Ran Out of Food in the Last Year: Never true     Ran Out of Food in the Last Year: Patient declined   Transportation Needs: No Transportation Needs (2025)    OASIS : Transportation     Lack of Transportation (Medical): No     Lack of Transportation (Non-Medical): No     Patient Unable or Declines to Respond: No   Intimate Partner Violence: Patient Declined (7/10/2025)    Nursing IPS     Physically Hurt by Someone: Patient declined     Hurt or  Threatened by Someone: Patient declined   Housing Stability: Patient Declined (7/10/2025)    Nursing: Inadequate Housing Risk Classification     Unable to Pay for Housing in the Last Year: Patient declined     Has Housing: Patient declined   [5]   Current Outpatient Medications:     atorvastatin (LIPITOR) 10 mg tablet, TAKE 1 TABLET BY MOUTH EVERY DAY, Disp: 90 tablet, Rfl: 1    divalproex sodium (DEPAKOTE) 250 mg DR tablet, TAKE 1.5 TABLETS BY MOUTH IN THE MORNING AND 1 TABLET AT NIGHT, Disp: 270 tablet, Rfl: 1    escitalopram (LEXAPRO) 10 mg tablet, Take 1 tablet (10 mg total) by mouth daily, Disp: 90 tablet, Rfl: 3    levothyroxine 100 mcg tablet, TAKE 1 TABLET (100 MCG TOTAL) BY MOUTH DAILY IN THE EARLY MORNING, Disp: 90 tablet, Rfl: 1    meloxicam (MOBIC) 15 mg tablet, Take 1 tablet (15 mg total) by mouth daily, Disp: 30 tablet, Rfl: 2    methocarbamol (ROBAXIN) 500 mg tablet, TAKE 1 TABLET BY MOUTH EVERY 8 HOURS AS NEEDED FOR MUSCLE SPASMS., Disp: 90 tablet, Rfl: 0

## 2025-07-23 ENCOUNTER — HOME CARE VISIT (OUTPATIENT)
Dept: HOME HEALTH SERVICES | Facility: HOME HEALTHCARE | Age: 75
End: 2025-07-23
Payer: MEDICARE

## 2025-07-23 VITALS — OXYGEN SATURATION: 98 % | SYSTOLIC BLOOD PRESSURE: 130 MMHG | DIASTOLIC BLOOD PRESSURE: 88 MMHG | HEART RATE: 72 BPM

## 2025-07-23 PROCEDURE — G0151 HHCP-SERV OF PT,EA 15 MIN: HCPCS

## 2025-07-24 ENCOUNTER — HOME CARE VISIT (OUTPATIENT)
Dept: HOME HEALTH SERVICES | Facility: HOME HEALTHCARE | Age: 75
End: 2025-07-24
Payer: MEDICARE

## 2025-07-24 ENCOUNTER — OFFICE VISIT (OUTPATIENT)
Dept: INTERNAL MEDICINE CLINIC | Facility: CLINIC | Age: 75
End: 2025-07-24
Payer: MEDICARE

## 2025-07-24 VITALS
HEART RATE: 80 BPM | WEIGHT: 144 LBS | DIASTOLIC BLOOD PRESSURE: 78 MMHG | BODY MASS INDEX: 23.99 KG/M2 | TEMPERATURE: 98.9 F | OXYGEN SATURATION: 96 % | HEIGHT: 65 IN | SYSTOLIC BLOOD PRESSURE: 128 MMHG

## 2025-07-24 DIAGNOSIS — S22.32XD CLOSED FRACTURE OF ONE RIB OF LEFT SIDE WITH ROUTINE HEALING, SUBSEQUENT ENCOUNTER: ICD-10-CM

## 2025-07-24 DIAGNOSIS — R41.0 CONFUSION: ICD-10-CM

## 2025-07-24 DIAGNOSIS — R29.6 MULTIPLE FALLS: ICD-10-CM

## 2025-07-24 DIAGNOSIS — R63.0 DECREASED APPETITE: ICD-10-CM

## 2025-07-24 DIAGNOSIS — R62.7 ADULT FAILURE TO THRIVE: Primary | ICD-10-CM

## 2025-07-24 DIAGNOSIS — R53.1 WEAKNESS: ICD-10-CM

## 2025-07-24 DIAGNOSIS — Z91.148 NONCOMPLIANCE WITH MEDICATION REGIMEN: ICD-10-CM

## 2025-07-24 DIAGNOSIS — R79.89 ABNORMAL THYROID BLOOD TEST: ICD-10-CM

## 2025-07-24 DIAGNOSIS — R32 URINARY INCONTINENCE, UNSPECIFIED TYPE: ICD-10-CM

## 2025-07-24 PROCEDURE — 99213 OFFICE O/P EST LOW 20 MIN: CPT | Performed by: INTERNAL MEDICINE

## 2025-07-24 PROCEDURE — G0156 HHCP-SVS OF AIDE,EA 15 MIN: HCPCS

## 2025-07-24 PROCEDURE — G2211 COMPLEX E/M VISIT ADD ON: HCPCS | Performed by: INTERNAL MEDICINE

## 2025-07-24 NOTE — PROGRESS NOTES
Name: Regino Mitchell      : 1950      MRN: [id number]  Encounter Provider: Hugh Thakkar PA-C  Encounter Date: 2025   Encounter department: [facility] [place]  :  Assessment & Plan  Right inguinal hernia  75-year-old male here for follow-up after right inguinal hernia repair with mesh.  The postoperative course had a fall about 3 days postop with resultant rib fractures and excoriation of the left upper quadrant abdomen.  Has been dealing with weakness and poor appetite that over the last 2 weeks have continued to improve.  Following with his PCP and now has VNA support at home.  On exam today he reports no pain to the surgical sites.  Tolerating regular diet with regular bowel movements at this time.  All questions answered to his understanding and satisfaction and a copy of the operative note provided and reviewed.  Advised to continue working with therapy at home and call our office as needed with any changes.         Dilated pancreatic duct  MRI ordered.             History of Present Illness   HPI  Regino Mitchell is a 75 y.o. male who presents po [facility] 7/10/2025. Patient states that he couldn't walk after his surgery and that he had fallen and broke a couple of ribs. He states that he is now doing better and no fever/chills but still having pain.  History obtained from: patient and patient's child    Review of Systems   All other systems reviewed and are negative.    Past Medical History   Past Medical History[1]  Past Surgical History[2]  Family History[3]   reports that he quit smoking about 11 years ago. His smoking use included cigars. He has been exposed to tobacco smoke. He has never used smokeless tobacco. He reports that he does not currently use alcohol. He reports that he does not use drugs.  Current Outpatient Medications   Medication Instructions    ascorbic acid (VITAMIN C) 500 mg, Oral, 2 times daily    atorvastatin (LIPITOR) 10 mg, Oral, Daily     divalproex sodium (DEPAKOTE) 250 mg DR tablet TAKE 1.5 TABLETS BY MOUTH IN THE MORNING AND 1 TABLET AT NIGHT    escitalopram (LEXAPRO) 10 mg, Oral, Daily    ferrous sulfate 324 mg, Oral, 2 times daily before meals    folic acid (FOLVITE) 1 mg, Oral, Daily    levothyroxine 100 mcg, Oral, Daily (early morning)    meloxicam (MOBIC) 15 mg, Oral, Daily    methocarbamol (ROBAXIN) 500 mg, Oral, Every 8 hours PRN    Multiple Vitamins-Minerals (multivitamin with minerals) tablet 1 tablet, Oral, Daily    mupirocin (BACTROBAN) 2 % ointment Apply 0.5g (half a tube) to each nostril twice per day for 5 days prior to procedure   Allergies[4]      Objective   Temp 98.4 °F (36.9 °C) (Temporal)      Physical Exam  Vitals and nursing note reviewed.   Constitutional:       General: He is not in acute distress.     Appearance: He is well-developed. He is not diaphoretic.   HENT:      Head: Normocephalic and atraumatic.     Eyes:      Conjunctiva/sclera: Conjunctivae normal.     Pulmonary:      Effort: No respiratory distress.   Abdominal:      Comments: Soft nontender.  Incisions clean dry intact.  Repair intact to the right groin.  Previously noted rash to the right thigh has resolved.     Musculoskeletal:         General: Normal range of motion.      Cervical back: Normal range of motion.     Skin:     General: Skin is warm and dry.      Capillary Refill: Capillary refill takes less than 2 seconds.     Neurological:      Mental Status: He is alert and oriented to person, place, and time.     Psychiatric:         Behavior: Behavior normal.              [1]   Past Medical History:  Diagnosis Date    Depression     Disease of thyroid gland     Fall     1/2025    High cholesterol     Memory loss     Seizure (HCC)    [2]   Past Surgical History:  Procedure Laterality Date    DENTAL SURGERY      HERNIA REPAIR      when he was a kid    [place] LAPAROSCOPY SURG RPR INITIAL INGUINAL HERNIA Right 7/10/2025    Procedure: REPAIR HERNIA INGUINAL,  LAPAROSCOPIC;  Surgeon: Ozzy Arora MD;  Location: [facility];  Service: General    WRIST SURGERY Right     when he was a kid   [3]   Family History  Problem Relation Name Age of Onset    Cancer Mother      Diabetes Mother      Prostate cancer Father      Cancer Brother      Prostate cancer Brother     [4] No Known Allergies

## 2025-07-24 NOTE — PROGRESS NOTES
Name: Regino Mitchell      : 1950      MRN: [id number]  Encounter Provider: Reg Palomares DO  Encounter Date: 2025  Encounter department: [facility]  :  Assessment & Plan  Adult failure to thrive         Multiple falls         Decreased appetite         Weakness         Confusion         Urinary incontinence, unspecified type         Noncompliance with medication regimen         Abnormal thyroid blood test         Closed fracture of one rib of left side with routine healing, subsequent encounter         A/P: Stable and doing better. Discussed labs and xrays. ACW pain improving. VNA going in. Doing better at his own home now. Appetite improving. Activity increasing with gait a little better. Mental status a a little better and less confused. Appears all due  to recent sx, being away from home, and non compliant with meds. Seen by ortho and TKA scheduled for the fall. Will continue with current meds and VNA. Still ?some dementia starting and MDD.  Await MRI head. Push PT and food. Continue on current meds.        History of Present Illness   WM RTC with his family for a one week f/u of FTT with decreased appetite, urinary incontinence, confusion, weakness with multiple falls, and left ACW pain. Pt found to be non compliant with meds and last TSH abnormal. Was s/p recent hernia a repair and ?anesthesia contributing.  Labs done and surprisingly normal, including urine and pt TSH back to normal after restarting his replacement. ?dementia setting in and ?MDD given loss of spouse last year. Moved back home and family taking on more hands on approach. Pt with similar event several months ago and rallied. Referred for PT via VNA> Reports doing a lot better. Appetite improved. Activity level increasing after knee injections. No falls. Family making sure pt is taking his meds. Mental status almost back to baseline..      Review of Systems   Constitutional:  Negative for activity change,  "chills, diaphoresis, fatigue and fever.   Respiratory:  Negative for cough, chest tightness, shortness of breath and wheezing.    Cardiovascular:  Negative for chest pain, palpitations and leg swelling.   Gastrointestinal:  Negative for abdominal pain, constipation, diarrhea, nausea and vomiting.   Genitourinary:  Negative for difficulty urinating, dysuria and frequency.   Musculoskeletal:  Negative for arthralgias, gait problem and myalgias.   Neurological:  Negative for dizziness, seizures, syncope, weakness, light-headedness and headaches.   Psychiatric/Behavioral:  Negative for confusion, dysphoric mood and sleep disturbance. The patient is not nervous/anxious.        Objective   /78 (BP Location: Left arm, Patient Position: Sitting, Cuff Size: Large)   Pulse 80   Temp 98.9 °F (37.2 °C) (Tympanic)   Ht 5' 5\" (1.651 m)   Wt 65.3 kg (144 lb)   SpO2 96%   BMI 23.96 kg/m²      Physical Exam  Constitutional:       General: He is not in acute distress.     Appearance: Normal appearance. He is not ill-appearing.   HENT:      Head: Normocephalic and atraumatic.      Mouth/Throat:      Mouth: Mucous membranes are moist.     Eyes:      Extraocular Movements: Extraocular movements intact.      Conjunctiva/sclera: Conjunctivae normal.      Pupils: Pupils are equal, round, and reactive to light.       Cardiovascular:      Rate and Rhythm: Normal rate and regular rhythm.      Heart sounds: Normal heart sounds. No murmur heard.  Pulmonary:      Effort: Pulmonary effort is normal. No respiratory distress.      Breath sounds: Normal breath sounds. No wheezing, rhonchi or rales.   Abdominal:      General: There is no distension.      Palpations: Abdomen is soft.      Tenderness: There is no abdominal tenderness.     Musculoskeletal:      Right lower leg: No edema.      Left lower leg: No edema.     Neurological:      General: No focal deficit present.      Mental Status: He is alert and oriented to person, place, and " time. Mental status is at baseline.     Psychiatric:         Mood and Affect: Mood normal.         Behavior: Behavior normal.         Thought Content: Thought content normal.         Judgment: Judgment normal.

## 2025-07-25 ENCOUNTER — OFFICE VISIT (OUTPATIENT)
Dept: SURGERY | Facility: CLINIC | Age: 75
End: 2025-07-25

## 2025-07-25 ENCOUNTER — HOME CARE VISIT (OUTPATIENT)
Dept: HOME HEALTH SERVICES | Facility: HOME HEALTHCARE | Age: 75
End: 2025-07-25
Payer: MEDICARE

## 2025-07-25 ENCOUNTER — TELEPHONE (OUTPATIENT)
Dept: SURGERY | Facility: CLINIC | Age: 75
End: 2025-07-25

## 2025-07-25 VITALS — TEMPERATURE: 98.4 F

## 2025-07-25 DIAGNOSIS — K86.89 DILATED PANCREATIC DUCT: ICD-10-CM

## 2025-07-25 DIAGNOSIS — K40.90 RIGHT INGUINAL HERNIA: Primary | ICD-10-CM

## 2025-07-25 PROCEDURE — 99024 POSTOP FOLLOW-UP VISIT: CPT | Performed by: PHYSICIAN ASSISTANT

## 2025-07-25 NOTE — TELEPHONE ENCOUNTER
----- Message from Hugh Thakkar PA-C sent at 7/25/2025 12:49 PM EDT -----  Could you call to remind patient to complete MRI that was ordered, I did not mention it during today's visit

## 2025-07-25 NOTE — ASSESSMENT & PLAN NOTE
75-year-old male here for follow-up after right inguinal hernia repair with mesh.  The postoperative course had a fall about 3 days postop with resultant rib fractures and excoriation of the left upper quadrant abdomen.  Has been dealing with weakness and poor appetite that over the last 2 weeks have continued to improve.  Following with his PCP and now has VNA support at home.  On exam today he reports no pain to the surgical sites.  Tolerating regular diet with regular bowel movements at this time.  All questions answered to his understanding and satisfaction and a copy of the operative note provided and reviewed.  Advised to continue working with therapy at home and call our office as needed with any changes.

## 2025-07-25 NOTE — TELEPHONE ENCOUNTER
Placed a call to the patient and his daughter answered stating she will call to reschedule the MRI appt. She had to canceled appt due to conflicting appointment times with other appts. I provided her the CS #:.

## 2025-07-28 ENCOUNTER — HOME CARE VISIT (OUTPATIENT)
Dept: HOME HEALTH SERVICES | Facility: HOME HEALTHCARE | Age: 75
End: 2025-07-28
Payer: MEDICARE

## 2025-07-28 VITALS
HEART RATE: 88 BPM | TEMPERATURE: 97.2 F | OXYGEN SATURATION: 95 % | RESPIRATION RATE: 18 BRPM | SYSTOLIC BLOOD PRESSURE: 104 MMHG | DIASTOLIC BLOOD PRESSURE: 68 MMHG

## 2025-07-28 VITALS — SYSTOLIC BLOOD PRESSURE: 110 MMHG | OXYGEN SATURATION: 96 % | DIASTOLIC BLOOD PRESSURE: 72 MMHG | HEART RATE: 88 BPM

## 2025-07-28 PROCEDURE — G0180 MD CERTIFICATION HHA PATIENT: HCPCS | Performed by: INTERNAL MEDICINE

## 2025-07-28 PROCEDURE — G0151 HHCP-SERV OF PT,EA 15 MIN: HCPCS

## 2025-07-28 PROCEDURE — G0299 HHS/HOSPICE OF RN EA 15 MIN: HCPCS

## 2025-07-30 ENCOUNTER — HOME CARE VISIT (OUTPATIENT)
Dept: HOME HEALTH SERVICES | Facility: HOME HEALTHCARE | Age: 75
End: 2025-07-30
Payer: MEDICARE

## 2025-07-30 VITALS — DIASTOLIC BLOOD PRESSURE: 60 MMHG | SYSTOLIC BLOOD PRESSURE: 120 MMHG | HEART RATE: 89 BPM | OXYGEN SATURATION: 97 %

## 2025-07-30 PROCEDURE — G0151 HHCP-SERV OF PT,EA 15 MIN: HCPCS

## 2025-07-31 ENCOUNTER — HOME CARE VISIT (OUTPATIENT)
Dept: HOME HEALTH SERVICES | Facility: HOME HEALTHCARE | Age: 75
End: 2025-07-31
Payer: MEDICARE

## 2025-07-31 VITALS
RESPIRATION RATE: 18 BRPM | TEMPERATURE: 97.6 F | SYSTOLIC BLOOD PRESSURE: 116 MMHG | OXYGEN SATURATION: 97 % | HEART RATE: 82 BPM | DIASTOLIC BLOOD PRESSURE: 70 MMHG

## 2025-07-31 PROCEDURE — G0299 HHS/HOSPICE OF RN EA 15 MIN: HCPCS

## 2025-08-05 ENCOUNTER — HOME CARE VISIT (OUTPATIENT)
Dept: HOME HEALTH SERVICES | Facility: HOME HEALTHCARE | Age: 75
End: 2025-08-05
Payer: MEDICARE

## 2025-08-05 VITALS — HEART RATE: 61 BPM | OXYGEN SATURATION: 96 % | DIASTOLIC BLOOD PRESSURE: 68 MMHG | SYSTOLIC BLOOD PRESSURE: 116 MMHG

## 2025-08-05 PROCEDURE — G0151 HHCP-SERV OF PT,EA 15 MIN: HCPCS

## 2025-08-07 ENCOUNTER — HOME CARE VISIT (OUTPATIENT)
Dept: HOME HEALTH SERVICES | Facility: HOME HEALTHCARE | Age: 75
End: 2025-08-07
Payer: MEDICARE

## 2025-08-07 VITALS — HEART RATE: 82 BPM | OXYGEN SATURATION: 97 % | SYSTOLIC BLOOD PRESSURE: 118 MMHG | DIASTOLIC BLOOD PRESSURE: 64 MMHG

## 2025-08-07 PROCEDURE — G0151 HHCP-SERV OF PT,EA 15 MIN: HCPCS

## 2025-08-14 ENCOUNTER — TELEPHONE (OUTPATIENT)
Dept: FAMILY MEDICINE CLINIC | Facility: CLINIC | Age: 75
End: 2025-08-14

## 2025-08-14 ENCOUNTER — TELEPHONE (OUTPATIENT)
Dept: INTERNAL MEDICINE CLINIC | Facility: CLINIC | Age: 75
End: 2025-08-14

## (undated) DEVICE — INTENDED FOR TISSUE SEPARATION, AND OTHER PROCEDURES THAT REQUIRE A SHARP SURGICAL BLADE TO PUNCTURE OR CUT.: Brand: BARD-PARKER ® CARBON RIB-BACK BLADES

## (undated) DEVICE — TROCAR: Brand: KII FIOS FIRST ENTRY

## (undated) DEVICE — TROCAR: Brand: KII® SLEEVE

## (undated) DEVICE — Device

## (undated) DEVICE — TRAY FOLEY 16FR URIMETER SURESTEP

## (undated) DEVICE — 4-PORT MANIFOLD: Brand: NEPTUNE 2

## (undated) DEVICE — PACK PBDS LAP CHOLE RF

## (undated) DEVICE — 2, DISPOSABLE SUCTION/IRRIGATOR WITHOUT DISPOSABLE TIP: Brand: STRYKEFLOW

## (undated) DEVICE — ANTIBACTERIAL UNDYED BRAIDED (POLYGLACTIN 910), SYNTHETIC ABSORBABLE SUTURE: Brand: COATED VICRYL

## (undated) DEVICE — CURVED JAW CORDLESS ULTRASONIC DISSECTOR: Brand: SONICISION 7

## (undated) DEVICE — MICRO HVTSA, 0.5G AND HVTSA SOURCEMARK PRODUCT CODE M1206 AND M1206-01: Brand: EXOFIN MICRO HVTSA, 0.5G

## (undated) DEVICE — TROCAR SITE CLOSURE DEVICE: Brand: ENDO CLOSE

## (undated) DEVICE — GARMENT,MEDLINE,DVT,INT,CALF,FOAM,MED: Brand: MEDLINE